# Patient Record
Sex: FEMALE | Race: WHITE
[De-identification: names, ages, dates, MRNs, and addresses within clinical notes are randomized per-mention and may not be internally consistent; named-entity substitution may affect disease eponyms.]

---

## 2018-01-17 ENCOUNTER — HOSPITAL ENCOUNTER (INPATIENT)
Dept: HOSPITAL 35 - ER | Age: 73
LOS: 4 days | Discharge: HOME | DRG: 871 | End: 2018-01-21
Attending: HOSPITALIST | Admitting: HOSPITALIST
Payer: COMMERCIAL

## 2018-01-17 VITALS
BODY MASS INDEX: 28.35 KG/M2 | DIASTOLIC BLOOD PRESSURE: 79 MMHG | HEIGHT: 62.99 IN | WEIGHT: 160 LBS | SYSTOLIC BLOOD PRESSURE: 148 MMHG

## 2018-01-17 VITALS — SYSTOLIC BLOOD PRESSURE: 189 MMHG | DIASTOLIC BLOOD PRESSURE: 69 MMHG

## 2018-01-17 VITALS — DIASTOLIC BLOOD PRESSURE: 100 MMHG | SYSTOLIC BLOOD PRESSURE: 185 MMHG

## 2018-01-17 VITALS — SYSTOLIC BLOOD PRESSURE: 182 MMHG | DIASTOLIC BLOOD PRESSURE: 94 MMHG

## 2018-01-17 DIAGNOSIS — I11.0: ICD-10-CM

## 2018-01-17 DIAGNOSIS — J96.21: ICD-10-CM

## 2018-01-17 DIAGNOSIS — Z87.891: ICD-10-CM

## 2018-01-17 DIAGNOSIS — E11.9: ICD-10-CM

## 2018-01-17 DIAGNOSIS — A41.9: Primary | ICD-10-CM

## 2018-01-17 DIAGNOSIS — I48.0: ICD-10-CM

## 2018-01-17 DIAGNOSIS — Z87.01: ICD-10-CM

## 2018-01-17 DIAGNOSIS — Z86.14: ICD-10-CM

## 2018-01-17 DIAGNOSIS — F32.9: ICD-10-CM

## 2018-01-17 DIAGNOSIS — Z79.899: ICD-10-CM

## 2018-01-17 DIAGNOSIS — M54.9: ICD-10-CM

## 2018-01-17 DIAGNOSIS — Z90.49: ICD-10-CM

## 2018-01-17 DIAGNOSIS — I27.20: ICD-10-CM

## 2018-01-17 DIAGNOSIS — J44.1: ICD-10-CM

## 2018-01-17 DIAGNOSIS — J11.1: ICD-10-CM

## 2018-01-17 DIAGNOSIS — F41.9: ICD-10-CM

## 2018-01-17 DIAGNOSIS — I50.31: ICD-10-CM

## 2018-01-17 DIAGNOSIS — E78.5: ICD-10-CM

## 2018-01-17 DIAGNOSIS — J96.22: ICD-10-CM

## 2018-01-17 DIAGNOSIS — Z90.710: ICD-10-CM

## 2018-01-17 DIAGNOSIS — G89.29: ICD-10-CM

## 2018-01-17 DIAGNOSIS — Z85.828: ICD-10-CM

## 2018-01-17 DIAGNOSIS — E78.00: ICD-10-CM

## 2018-01-17 DIAGNOSIS — R65.20: ICD-10-CM

## 2018-01-17 LAB
ALBUMIN SERPL-MCNC: 3.1 G/DL (ref 3.4–5)
ALT SERPL-CCNC: 31 U/L (ref 30–65)
ANION GAP SERPL CALC-SCNC: 3 MMOL/L (ref 7–16)
AST SERPL-CCNC: 33 U/L (ref 15–37)
BASOPHILS NFR BLD AUTO: 0.7 % (ref 0–2)
BE(VIVO): 9 MMOL/L
BILIRUB SERPL-MCNC: 0.3 MG/DL
BUN SERPL-MCNC: 25 MG/DL (ref 7–18)
CALCIUM SERPL-MCNC: 9.5 MG/DL (ref 8.5–10.1)
CHLORIDE SERPL-SCNC: 98 MMOL/L (ref 98–107)
CO2 SERPL-SCNC: 38 MMOL/L (ref 21–32)
CREAT SERPL-MCNC: 0.9 MG/DL (ref 0.6–1)
EOSINOPHIL NFR BLD: 6 % (ref 0–3)
ERYTHROCYTE [DISTWIDTH] IN BLOOD BY AUTOMATED COUNT: 18.4 % (ref 10.5–14.5)
GLUCOSE SERPL-MCNC: 71 MG/DL (ref 74–106)
GRANULOCYTES NFR BLD MANUAL: 44.2 % (ref 36–66)
HCO3 BLD-SCNC: 35.2 MMOL/L (ref 22–26)
HCT VFR BLD CALC: 35.3 % (ref 37–47)
HGB BLD-MCNC: 11.4 GM/DL (ref 12–15)
LYMPHOCYTES NFR BLD AUTO: 39 % (ref 24–44)
MCH RBC QN AUTO: 26.2 PG (ref 26–34)
MCHC RBC AUTO-ENTMCNC: 32.4 G/DL (ref 28–37)
MCV RBC: 80.8 FL (ref 80–100)
MONOCYTES NFR BLD: 10.1 % (ref 1–8)
NEUTROPHILS # BLD: 1.8 THOU/UL (ref 1.4–8.2)
PCO2 BLD: 56 MMHG (ref 35–45)
PLATELET # BLD: 166 THOU/UL (ref 150–400)
PO2 BLD: 48 MMHG (ref 80–100)
POTASSIUM SERPL-SCNC: 3.8 MMOL/L (ref 3.5–5.1)
PROT SERPL-MCNC: 7 G/DL (ref 6.4–8.2)
RBC # BLD AUTO: 4.37 MIL/UL (ref 4.2–5)
SODIUM SERPL-SCNC: 139 MMOL/L (ref 136–145)
TROPONIN I SERPL-MCNC: < 0.04 NG/ML (ref ?–0.06)
WBC # BLD AUTO: 4 THOU/UL (ref 4–11)

## 2018-01-17 PROCEDURE — 27001 TENOTOMY ADDUCTOR HIP OPEN: CPT

## 2018-01-17 PROCEDURE — 10100: CPT

## 2018-01-18 VITALS — DIASTOLIC BLOOD PRESSURE: 63 MMHG | SYSTOLIC BLOOD PRESSURE: 159 MMHG

## 2018-01-18 VITALS — SYSTOLIC BLOOD PRESSURE: 159 MMHG | DIASTOLIC BLOOD PRESSURE: 63 MMHG

## 2018-01-18 VITALS — SYSTOLIC BLOOD PRESSURE: 164 MMHG | DIASTOLIC BLOOD PRESSURE: 68 MMHG

## 2018-01-18 VITALS — DIASTOLIC BLOOD PRESSURE: 70 MMHG | SYSTOLIC BLOOD PRESSURE: 156 MMHG

## 2018-01-18 VITALS — SYSTOLIC BLOOD PRESSURE: 108 MMHG | DIASTOLIC BLOOD PRESSURE: 63 MMHG

## 2018-01-18 VITALS — DIASTOLIC BLOOD PRESSURE: 68 MMHG | SYSTOLIC BLOOD PRESSURE: 164 MMHG

## 2018-01-18 LAB
ANION GAP SERPL CALC-SCNC: 4 MMOL/L (ref 7–16)
ANION GAP SERPL CALC-SCNC: 5 MMOL/L (ref 7–16)
BUN SERPL-MCNC: 26 MG/DL (ref 7–18)
BUN SERPL-MCNC: 26 MG/DL (ref 7–18)
CALCIUM SERPL-MCNC: 8.9 MG/DL (ref 8.5–10.1)
CALCIUM SERPL-MCNC: 9.3 MG/DL (ref 8.5–10.1)
CHLORIDE SERPL-SCNC: 97 MMOL/L (ref 98–107)
CHLORIDE SERPL-SCNC: 98 MMOL/L (ref 98–107)
CO2 SERPL-SCNC: 34 MMOL/L (ref 21–32)
CO2 SERPL-SCNC: 34 MMOL/L (ref 21–32)
CREAT SERPL-MCNC: 0.9 MG/DL (ref 0.6–1)
CREAT SERPL-MCNC: 1 MG/DL (ref 0.6–1)
ERYTHROCYTE [DISTWIDTH] IN BLOOD BY AUTOMATED COUNT: 18.2 % (ref 10.5–14.5)
ERYTHROCYTE [DISTWIDTH] IN BLOOD BY AUTOMATED COUNT: 18.6 % (ref 10.5–14.5)
GLUCOSE SERPL-MCNC: 270 MG/DL (ref 74–106)
GLUCOSE SERPL-MCNC: 296 MG/DL (ref 74–106)
GRANULOCYTES NFR BLD MANUAL: 75 % (ref 36–66)
HCT VFR BLD CALC: 32.5 % (ref 37–47)
HCT VFR BLD CALC: 34.1 % (ref 37–47)
HGB BLD-MCNC: 10.8 GM/DL (ref 12–15)
HGB BLD-MCNC: 11.1 GM/DL (ref 12–15)
LYMPHOCYTES NFR BLD AUTO: 17 % (ref 24–44)
MCH RBC QN AUTO: 26.1 PG (ref 26–34)
MCH RBC QN AUTO: 26.5 PG (ref 26–34)
MCHC RBC AUTO-ENTMCNC: 32.4 G/DL (ref 28–37)
MCHC RBC AUTO-ENTMCNC: 33.1 G/DL (ref 28–37)
MCV RBC: 80.3 FL (ref 80–100)
MCV RBC: 80.5 FL (ref 80–100)
MONOCYTES NFR BLD: 4 % (ref 1–8)
NEUTROPHILS # BLD: 1.4 THOU/UL (ref 1.4–8.2)
NEUTS BAND NFR BLD: 4 % (ref 0–8)
PLATELET # BLD: 158 THOU/UL (ref 150–400)
PLATELET # BLD: 163 THOU/UL (ref 150–400)
POTASSIUM SERPL-SCNC: 2.9 MMOL/L (ref 3.5–5.1)
POTASSIUM SERPL-SCNC: 3.2 MMOL/L (ref 3.5–5.1)
RBC # BLD AUTO: 4.05 MIL/UL (ref 4.2–5)
RBC # BLD AUTO: 4.24 MIL/UL (ref 4.2–5)
SODIUM SERPL-SCNC: 136 MMOL/L (ref 136–145)
SODIUM SERPL-SCNC: 136 MMOL/L (ref 136–145)
WBC # BLD AUTO: 1.8 THOU/UL (ref 4–11)
WBC # BLD AUTO: 1.9 THOU/UL (ref 4–11)

## 2018-01-19 VITALS — SYSTOLIC BLOOD PRESSURE: 156 MMHG | DIASTOLIC BLOOD PRESSURE: 77 MMHG

## 2018-01-19 VITALS — DIASTOLIC BLOOD PRESSURE: 61 MMHG | SYSTOLIC BLOOD PRESSURE: 152 MMHG

## 2018-01-19 VITALS — SYSTOLIC BLOOD PRESSURE: 133 MMHG | DIASTOLIC BLOOD PRESSURE: 61 MMHG

## 2018-01-19 VITALS — DIASTOLIC BLOOD PRESSURE: 63 MMHG | SYSTOLIC BLOOD PRESSURE: 144 MMHG

## 2018-01-19 VITALS — DIASTOLIC BLOOD PRESSURE: 55 MMHG | SYSTOLIC BLOOD PRESSURE: 123 MMHG

## 2018-01-19 LAB
ANION GAP SERPL CALC-SCNC: 6 MMOL/L (ref 7–16)
BASOPHILS NFR BLD AUTO: 0.1 % (ref 0–2)
BUN SERPL-MCNC: 28 MG/DL (ref 7–18)
CALCIUM SERPL-MCNC: 9.2 MG/DL (ref 8.5–10.1)
CHLORIDE SERPL-SCNC: 99 MMOL/L (ref 98–107)
CO2 SERPL-SCNC: 33 MMOL/L (ref 21–32)
CREAT SERPL-MCNC: 0.9 MG/DL (ref 0.6–1)
EOSINOPHIL NFR BLD: 0 % (ref 0–3)
ERYTHROCYTE [DISTWIDTH] IN BLOOD BY AUTOMATED COUNT: 18.3 % (ref 10.5–14.5)
GLUCOSE SERPL-MCNC: 189 MG/DL (ref 74–106)
GRANULOCYTES NFR BLD MANUAL: 83.2 % (ref 36–66)
HCT VFR BLD CALC: 33.1 % (ref 37–47)
HGB BLD-MCNC: 10.8 GM/DL (ref 12–15)
LYMPHOCYTES NFR BLD AUTO: 13.6 % (ref 24–44)
MCH RBC QN AUTO: 26.2 PG (ref 26–34)
MCHC RBC AUTO-ENTMCNC: 32.5 G/DL (ref 28–37)
MCV RBC: 80.8 FL (ref 80–100)
MONOCYTES NFR BLD: 3.1 % (ref 1–8)
NEUTROPHILS # BLD: 5 THOU/UL (ref 1.4–8.2)
PLATELET # BLD: 176 THOU/UL (ref 150–400)
POTASSIUM SERPL-SCNC: 3.6 MMOL/L (ref 3.5–5.1)
RBC # BLD AUTO: 4.1 MIL/UL (ref 4.2–5)
SODIUM SERPL-SCNC: 138 MMOL/L (ref 136–145)
WBC # BLD AUTO: 6.1 THOU/UL (ref 4–11)

## 2018-01-20 VITALS — DIASTOLIC BLOOD PRESSURE: 63 MMHG | SYSTOLIC BLOOD PRESSURE: 158 MMHG

## 2018-01-20 VITALS — SYSTOLIC BLOOD PRESSURE: 179 MMHG | DIASTOLIC BLOOD PRESSURE: 85 MMHG

## 2018-01-20 VITALS — SYSTOLIC BLOOD PRESSURE: 179 MMHG | DIASTOLIC BLOOD PRESSURE: 87 MMHG

## 2018-01-21 VITALS — SYSTOLIC BLOOD PRESSURE: 179 MMHG | DIASTOLIC BLOOD PRESSURE: 90 MMHG

## 2018-01-21 VITALS — SYSTOLIC BLOOD PRESSURE: 168 MMHG | DIASTOLIC BLOOD PRESSURE: 86 MMHG

## 2018-01-21 VITALS — DIASTOLIC BLOOD PRESSURE: 86 MMHG | SYSTOLIC BLOOD PRESSURE: 168 MMHG

## 2018-02-01 ENCOUNTER — HOSPITAL ENCOUNTER (OUTPATIENT)
Dept: HOSPITAL 35 - CV | Age: 73
End: 2018-02-01
Attending: INTERNAL MEDICINE
Payer: COMMERCIAL

## 2018-02-01 DIAGNOSIS — I08.1: Primary | ICD-10-CM

## 2018-02-07 ENCOUNTER — HOSPITAL ENCOUNTER (OUTPATIENT)
Dept: HOSPITAL 35 - RAD | Age: 73
End: 2018-02-07
Attending: INTERNAL MEDICINE
Payer: COMMERCIAL

## 2018-02-07 DIAGNOSIS — J45.30: Primary | ICD-10-CM

## 2018-02-16 ENCOUNTER — HOSPITAL ENCOUNTER (EMERGENCY)
Dept: HOSPITAL 35 - ER | Age: 73
Discharge: HOME | End: 2018-02-16
Payer: COMMERCIAL

## 2018-02-16 VITALS — WEIGHT: 210.01 LBS | HEIGHT: 63 IN | BODY MASS INDEX: 37.21 KG/M2

## 2018-02-16 VITALS — SYSTOLIC BLOOD PRESSURE: 159 MMHG | DIASTOLIC BLOOD PRESSURE: 66 MMHG

## 2018-02-16 DIAGNOSIS — I10: ICD-10-CM

## 2018-02-16 DIAGNOSIS — E11.9: ICD-10-CM

## 2018-02-16 DIAGNOSIS — M79.7: ICD-10-CM

## 2018-02-16 DIAGNOSIS — F41.9: ICD-10-CM

## 2018-02-16 DIAGNOSIS — Z87.891: ICD-10-CM

## 2018-02-16 DIAGNOSIS — E87.6: ICD-10-CM

## 2018-02-16 DIAGNOSIS — J44.1: Primary | ICD-10-CM

## 2018-02-16 LAB
ANION GAP SERPL CALC-SCNC: 4 MMOL/L (ref 7–16)
BASOPHILS NFR BLD AUTO: 0 % (ref 0–2)
BE(VIVO): 7.2 MMOL/L
BUN SERPL-MCNC: 10 MG/DL (ref 7–18)
CALCIUM SERPL-MCNC: 9.5 MG/DL (ref 8.5–10.1)
CHLORIDE SERPL-SCNC: 101 MMOL/L (ref 98–107)
CO2 SERPL-SCNC: 34 MMOL/L (ref 21–32)
CREAT SERPL-MCNC: 0.8 MG/DL (ref 0.6–1)
EOSINOPHIL NFR BLD: 3 % (ref 0–3)
ERYTHROCYTE [DISTWIDTH] IN BLOOD BY AUTOMATED COUNT: 17.5 % (ref 10.5–14.5)
GLUCOSE SERPL-MCNC: 198 MG/DL (ref 74–106)
GRANULOCYTES NFR BLD MANUAL: 59 % (ref 36–66)
HCO3 BLD-SCNC: 31.4 MMOL/L (ref 22–26)
HCT VFR BLD CALC: 33.1 % (ref 37–47)
HGB BLD-MCNC: 10.9 GM/DL (ref 12–15)
LYMPHOCYTES NFR BLD AUTO: 12 % (ref 24–44)
MCH RBC QN AUTO: 27 PG (ref 26–34)
MCHC RBC AUTO-ENTMCNC: 32.8 G/DL (ref 28–37)
MCV RBC: 82.2 FL (ref 80–100)
METAMYELOCYTES NFR BLD: 2 %
MONOCYTES NFR BLD: 10 % (ref 1–8)
NEUTROPHILS # BLD: 7.6 THOU/UL (ref 1.4–8.2)
NEUTS BAND NFR BLD: 8 % (ref 0–8)
PCO2 BLD: 42.7 MMHG (ref 35–45)
PLATELET # BLD EST: NORMAL 10*3/UL
PLATELET # BLD: 190 THOU/UL (ref 150–400)
PO2 BLD: 81.7 MMHG (ref 80–100)
POTASSIUM SERPL-SCNC: 2.5 MMOL/L (ref 3.5–5.1)
RBC # BLD AUTO: 4.03 MIL/UL (ref 4.2–5)
RBC MORPH BLD: NORMAL
SODIUM SERPL-SCNC: 139 MMOL/L (ref 136–145)
TROPONIN I SERPL-MCNC: < 0.04 NG/ML (ref ?–0.06)
VARIANT LYMPHS NFR BLD MANUAL: 6 %
WBC # BLD AUTO: 11.4 THOU/UL (ref 4–11)

## 2018-03-25 ENCOUNTER — HOSPITAL ENCOUNTER (EMERGENCY)
Dept: HOSPITAL 35 - ER | Age: 73
Discharge: HOME | End: 2018-03-25
Payer: COMMERCIAL

## 2018-03-25 VITALS — DIASTOLIC BLOOD PRESSURE: 56 MMHG | SYSTOLIC BLOOD PRESSURE: 133 MMHG

## 2018-03-25 VITALS — WEIGHT: 210.01 LBS | BODY MASS INDEX: 37.21 KG/M2 | HEIGHT: 63 IN

## 2018-03-25 DIAGNOSIS — M79.7: ICD-10-CM

## 2018-03-25 DIAGNOSIS — R10.30: ICD-10-CM

## 2018-03-25 DIAGNOSIS — M25.562: ICD-10-CM

## 2018-03-25 DIAGNOSIS — Z86.14: ICD-10-CM

## 2018-03-25 DIAGNOSIS — F41.9: ICD-10-CM

## 2018-03-25 DIAGNOSIS — M25.561: Primary | ICD-10-CM

## 2018-03-25 DIAGNOSIS — E11.9: ICD-10-CM

## 2018-03-25 DIAGNOSIS — J44.9: ICD-10-CM

## 2018-03-25 DIAGNOSIS — Z90.710: ICD-10-CM

## 2018-03-25 DIAGNOSIS — F11.20: ICD-10-CM

## 2018-03-25 DIAGNOSIS — I10: ICD-10-CM

## 2018-03-25 DIAGNOSIS — E78.00: ICD-10-CM

## 2018-03-25 DIAGNOSIS — G89.29: ICD-10-CM

## 2018-03-25 DIAGNOSIS — Z85.828: ICD-10-CM

## 2018-03-25 DIAGNOSIS — Z87.891: ICD-10-CM

## 2018-03-25 DIAGNOSIS — M25.572: ICD-10-CM

## 2018-03-25 LAB
BILIRUB UR-MCNC: NEGATIVE MG/DL
COLOR UR: YELLOW
KETONES UR STRIP-MCNC: NEGATIVE MG/DL
NITRITE UR QL STRIP: NEGATIVE
RBC # UR STRIP: NEGATIVE /UL
SP GR UR STRIP: 1.01 (ref 1–1.03)
URINE CLARITY: CLEAR
URINE GLUCOSE-RANDOM*: NEGATIVE
URINE LEUKOCYTES: NEGATIVE
URINE PROTEIN (DIPSTICK): NEGATIVE
UROBILINOGEN UR STRIP-ACNC: 0.2 E.U./DL (ref 0.2–1)

## 2018-03-26 ENCOUNTER — HOSPITAL ENCOUNTER (EMERGENCY)
Dept: HOSPITAL 35 - ER | Age: 73
Discharge: HOME | End: 2018-03-26
Payer: COMMERCIAL

## 2018-03-26 VITALS — SYSTOLIC BLOOD PRESSURE: 188 MMHG | DIASTOLIC BLOOD PRESSURE: 70 MMHG

## 2018-03-26 VITALS — HEIGHT: 63 IN | WEIGHT: 210.01 LBS | BODY MASS INDEX: 37.21 KG/M2

## 2018-03-26 DIAGNOSIS — E78.00: ICD-10-CM

## 2018-03-26 DIAGNOSIS — F41.9: ICD-10-CM

## 2018-03-26 DIAGNOSIS — Z86.14: ICD-10-CM

## 2018-03-26 DIAGNOSIS — Z85.828: ICD-10-CM

## 2018-03-26 DIAGNOSIS — J44.9: ICD-10-CM

## 2018-03-26 DIAGNOSIS — I48.91: ICD-10-CM

## 2018-03-26 DIAGNOSIS — E11.9: ICD-10-CM

## 2018-03-26 DIAGNOSIS — R22.0: Primary | ICD-10-CM

## 2018-03-26 DIAGNOSIS — M79.7: ICD-10-CM

## 2018-03-26 DIAGNOSIS — Z87.891: ICD-10-CM

## 2018-03-26 DIAGNOSIS — I10: ICD-10-CM

## 2018-03-26 DIAGNOSIS — Z90.710: ICD-10-CM

## 2018-03-26 LAB
ANION GAP SERPL CALC-SCNC: 6 MMOL/L (ref 7–16)
ANISOCYTOSIS BLD QL SMEAR: (no result)
BUN SERPL-MCNC: 22 MG/DL (ref 7–18)
CALCIUM SERPL-MCNC: 10.3 MG/DL (ref 8.5–10.1)
CHLORIDE SERPL-SCNC: 97 MMOL/L (ref 98–107)
CO2 SERPL-SCNC: 39 MMOL/L (ref 21–32)
CREAT SERPL-MCNC: 1.1 MG/DL (ref 0.6–1)
EOSINOPHIL NFR BLD: 2 % (ref 0–3)
ERYTHROCYTE [DISTWIDTH] IN BLOOD BY AUTOMATED COUNT: 15.8 % (ref 10.5–14.5)
GLUCOSE SERPL-MCNC: 133 MG/DL (ref 74–106)
GRANULOCYTES NFR BLD MANUAL: 73 % (ref 36–66)
HCT VFR BLD CALC: 37.3 % (ref 37–47)
HGB BLD-MCNC: 12.3 GM/DL (ref 12–15)
LYMPHOCYTES NFR BLD AUTO: 18 % (ref 24–44)
MCH RBC QN AUTO: 27.4 PG (ref 26–34)
MCHC RBC AUTO-ENTMCNC: 32.9 G/DL (ref 28–37)
MCV RBC: 83.3 FL (ref 80–100)
METAMYELOCYTES NFR BLD: 2 %
MONOCYTES NFR BLD: 2 % (ref 1–8)
NEUTROPHILS # BLD: 6.8 THOU/UL (ref 1.4–8.2)
NEUTS BAND NFR BLD: 3 % (ref 0–8)
PLATELET # BLD: 194 THOU/UL (ref 150–400)
POLYCHROMASIA BLD QL SMEAR: SLIGHT
POTASSIUM SERPL-SCNC: 3.8 MMOL/L (ref 3.5–5.1)
RBC # BLD AUTO: 4.47 MIL/UL (ref 4.2–5)
SODIUM SERPL-SCNC: 142 MMOL/L (ref 136–145)
WBC # BLD AUTO: 9 THOU/UL (ref 4–11)

## 2018-09-19 ENCOUNTER — HOSPITAL ENCOUNTER (EMERGENCY)
Dept: HOSPITAL 35 - ER | Age: 73
Discharge: HOME | End: 2018-09-19
Payer: COMMERCIAL

## 2018-09-19 VITALS — HEIGHT: 63 IN | WEIGHT: 210.01 LBS | BODY MASS INDEX: 37.21 KG/M2

## 2018-09-19 VITALS — DIASTOLIC BLOOD PRESSURE: 79 MMHG | SYSTOLIC BLOOD PRESSURE: 170 MMHG

## 2018-09-19 DIAGNOSIS — M79.7: ICD-10-CM

## 2018-09-19 DIAGNOSIS — E11.9: ICD-10-CM

## 2018-09-19 DIAGNOSIS — Z85.828: ICD-10-CM

## 2018-09-19 DIAGNOSIS — I48.91: ICD-10-CM

## 2018-09-19 DIAGNOSIS — I10: ICD-10-CM

## 2018-09-19 DIAGNOSIS — E78.00: ICD-10-CM

## 2018-09-19 DIAGNOSIS — M17.0: Primary | ICD-10-CM

## 2018-09-19 DIAGNOSIS — M54.5: ICD-10-CM

## 2018-09-19 DIAGNOSIS — J44.9: ICD-10-CM

## 2018-09-19 DIAGNOSIS — F41.9: ICD-10-CM

## 2018-09-19 DIAGNOSIS — Z90.710: ICD-10-CM

## 2018-09-19 DIAGNOSIS — Z87.891: ICD-10-CM

## 2018-09-19 LAB
ANION GAP SERPL CALC-SCNC: 3 MMOL/L (ref 7–16)
BASOPHILS NFR BLD AUTO: 0.4 % (ref 0–2)
BUN SERPL-MCNC: 17 MG/DL (ref 7–18)
CALCIUM SERPL-MCNC: 10.2 MG/DL (ref 8.5–10.1)
CHLORIDE SERPL-SCNC: 104 MMOL/L (ref 98–107)
CO2 SERPL-SCNC: 36 MMOL/L (ref 21–32)
CREAT SERPL-MCNC: 1 MG/DL (ref 0.6–1)
EOSINOPHIL NFR BLD: 2.2 % (ref 0–3)
ERYTHROCYTE [DISTWIDTH] IN BLOOD BY AUTOMATED COUNT: 14.5 % (ref 10.5–14.5)
GLUCOSE SERPL-MCNC: 89 MG/DL (ref 74–106)
GRANULOCYTES NFR BLD MANUAL: 55.2 % (ref 36–66)
HCT VFR BLD CALC: 38 % (ref 37–47)
HGB BLD-MCNC: 12.5 GM/DL (ref 12–15)
LYMPHOCYTES NFR BLD AUTO: 33.8 % (ref 24–44)
MCH RBC QN AUTO: 27.4 PG (ref 26–34)
MCHC RBC AUTO-ENTMCNC: 32.9 G/DL (ref 28–37)
MCV RBC: 83.2 FL (ref 80–100)
MONOCYTES NFR BLD: 8.4 % (ref 1–8)
NEUTROPHILS # BLD: 3.7 THOU/UL (ref 1.4–8.2)
PLATELET # BLD: 165 THOU/UL (ref 150–400)
POTASSIUM SERPL-SCNC: 3.7 MMOL/L (ref 3.5–5.1)
RBC # BLD AUTO: 4.57 MIL/UL (ref 4.2–5)
SODIUM SERPL-SCNC: 143 MMOL/L (ref 136–145)
WBC # BLD AUTO: 6.8 THOU/UL (ref 4–11)

## 2018-10-17 VITALS — HEIGHT: 63 IN | WEIGHT: 200 LBS

## 2018-10-31 ENCOUNTER — HOSPITAL ENCOUNTER (EMERGENCY)
Dept: HOSPITAL 35 - ER | Age: 73
LOS: 1 days | Discharge: HOME | End: 2018-11-01
Payer: COMMERCIAL

## 2018-10-31 VITALS — WEIGHT: 200 LBS | BODY MASS INDEX: 35.44 KG/M2 | HEIGHT: 63 IN

## 2018-10-31 DIAGNOSIS — I27.20: ICD-10-CM

## 2018-10-31 DIAGNOSIS — G89.29: Primary | ICD-10-CM

## 2018-10-31 DIAGNOSIS — I48.0: ICD-10-CM

## 2018-10-31 DIAGNOSIS — M25.561: ICD-10-CM

## 2018-10-31 DIAGNOSIS — E11.40: ICD-10-CM

## 2018-10-31 DIAGNOSIS — Z87.01: ICD-10-CM

## 2018-10-31 DIAGNOSIS — Z86.14: ICD-10-CM

## 2018-10-31 DIAGNOSIS — Z85.828: ICD-10-CM

## 2018-10-31 DIAGNOSIS — J44.9: ICD-10-CM

## 2018-10-31 DIAGNOSIS — M25.562: ICD-10-CM

## 2018-10-31 DIAGNOSIS — M79.7: ICD-10-CM

## 2018-10-31 DIAGNOSIS — E78.00: ICD-10-CM

## 2018-10-31 DIAGNOSIS — Z87.891: ICD-10-CM

## 2018-10-31 LAB
ALBUMIN SERPL-MCNC: 3.3 G/DL (ref 3.4–5)
ALT SERPL-CCNC: 27 U/L (ref 30–65)
ANION GAP SERPL CALC-SCNC: 6 MMOL/L (ref 7–16)
AST SERPL-CCNC: 24 U/L (ref 15–37)
BASOPHILS NFR BLD AUTO: 1.1 % (ref 0–2)
BILIRUB SERPL-MCNC: 0.3 MG/DL
BUN SERPL-MCNC: 11 MG/DL (ref 7–18)
CALCIUM SERPL-MCNC: 9.6 MG/DL (ref 8.5–10.1)
CHLORIDE SERPL-SCNC: 102 MMOL/L (ref 98–107)
CO2 SERPL-SCNC: 32 MMOL/L (ref 21–32)
CREAT SERPL-MCNC: 1 MG/DL (ref 0.6–1)
EOSINOPHIL NFR BLD: 2.9 % (ref 0–3)
ERYTHROCYTE [DISTWIDTH] IN BLOOD BY AUTOMATED COUNT: 14.8 % (ref 10.5–14.5)
GLUCOSE SERPL-MCNC: 124 MG/DL (ref 74–106)
GRANULOCYTES NFR BLD MANUAL: 56.2 % (ref 36–66)
HCT VFR BLD CALC: 37.7 % (ref 37–47)
HGB BLD-MCNC: 12.5 GM/DL (ref 12–15)
LYMPHOCYTES NFR BLD AUTO: 31.6 % (ref 24–44)
MCH RBC QN AUTO: 26.8 PG (ref 26–34)
MCHC RBC AUTO-ENTMCNC: 33.3 G/DL (ref 28–37)
MCV RBC: 80.6 FL (ref 80–100)
MONOCYTES NFR BLD: 8.2 % (ref 1–8)
NEUTROPHILS # BLD: 4 THOU/UL (ref 1.4–8.2)
PLATELET # BLD: 187 THOU/UL (ref 150–400)
POTASSIUM SERPL-SCNC: 3.6 MMOL/L (ref 3.5–5.1)
PROT SERPL-MCNC: 6.9 G/DL (ref 6.4–8.2)
RBC # BLD AUTO: 4.67 MIL/UL (ref 4.2–5)
SODIUM SERPL-SCNC: 140 MMOL/L (ref 136–145)
WBC # BLD AUTO: 7.1 THOU/UL (ref 4–11)

## 2018-11-01 VITALS — SYSTOLIC BLOOD PRESSURE: 144 MMHG | DIASTOLIC BLOOD PRESSURE: 87 MMHG

## 2018-11-02 ENCOUNTER — APPOINTMENT (RX ONLY)
Dept: URBAN - METROPOLITAN AREA CLINIC 141 | Facility: CLINIC | Age: 73
Setting detail: DERMATOLOGY
End: 2018-11-02

## 2018-11-02 ENCOUNTER — APPOINTMENT (RX ONLY)
Dept: URBAN - METROPOLITAN AREA SURGERY CENTER 10 | Facility: SURGERY CENTER | Age: 73
Setting detail: DERMATOLOGY
End: 2018-11-02

## 2018-11-02 VITALS
HEIGHT: 63 IN | SYSTOLIC BLOOD PRESSURE: 136 MMHG | DIASTOLIC BLOOD PRESSURE: 88 MMHG | WEIGHT: 200 LBS | HEART RATE: 91 BPM

## 2018-11-02 PROBLEM — D04.39 CARCINOMA IN SITU OF SKIN OF OTHER PARTS OF FACE: Status: ACTIVE | Noted: 2018-11-02

## 2018-11-02 PROBLEM — M12.9 ARTHROPATHY, UNSPECIFIED: Status: ACTIVE | Noted: 2018-11-02

## 2018-11-02 PROBLEM — Z85.828 PERSONAL HISTORY OF OTHER MALIGNANT NEOPLASM OF SKIN: Status: ACTIVE | Noted: 2018-11-02

## 2018-11-02 PROBLEM — H91.90 UNSPECIFIED HEARING LOSS, UNSPECIFIED EAR: Status: ACTIVE | Noted: 2018-11-02

## 2018-11-02 PROBLEM — J44.9 CHRONIC OBSTRUCTIVE PULMONARY DISEASE, UNSPECIFIED: Status: ACTIVE | Noted: 2018-11-02

## 2018-11-02 PROBLEM — F32.9 MAJOR DEPRESSIVE DISORDER, SINGLE EPISODE, UNSPECIFIED: Status: ACTIVE | Noted: 2018-11-02

## 2018-11-02 PROBLEM — E13.9 OTHER SPECIFIED DIABETES MELLITUS WITHOUT COMPLICATIONS: Status: ACTIVE | Noted: 2018-11-02

## 2018-11-02 PROBLEM — F41.9 ANXIETY DISORDER, UNSPECIFIED: Status: ACTIVE | Noted: 2018-11-02

## 2018-11-02 PROCEDURE — 17311 MOHS 1 STAGE H/N/HF/G: CPT

## 2018-11-02 PROCEDURE — 13132 CMPLX RPR F/C/C/M/N/AX/G/H/F: CPT

## 2018-11-02 PROCEDURE — ? MOHS SURGERY

## 2018-11-02 PROCEDURE — 13133 CMPLX RPR F/C/C/M/N/AX/G/H/F: CPT

## 2018-11-02 PROCEDURE — ? PRESCRIPTION

## 2018-11-02 PROCEDURE — ? REPAIR NOTE

## 2018-11-02 RX ORDER — CEPHALEXIN 500 MG/1
CAPSULE ORAL
Qty: 30 | Refills: 0 | Status: ERX | COMMUNITY
Start: 2018-11-02

## 2018-11-02 RX ADMIN — CEPHALEXIN: 500 CAPSULE ORAL at 16:01

## 2018-11-02 NOTE — PROCEDURE: REPAIR NOTE
Double M-Plasty Complex Repair Preamble Text (Leave Blank If You Do Not Want): Extensive wide undermining was performed.
Mastoid Interpolation Flap Text: A decision was made to reconstruct the defect utilizing an interpolation axial flap and a staged reconstruction.  A telfa template was made of the defect.  This telfa template was then used to outline the mastoid interpolation flap.  The donor area for the pedicle flap was then injected with anesthesia.  The flap was excised through the skin and subcutaneous tissue down to the layer of the underlying musculature.  The pedicle flap was carefully excised within this deep plane to maintain its blood supply.  The edges of the donor site were undermined.   The donor site was closed in a primary fashion.  The pedicle was then rotated into position and sutured.  Once the tube was sutured into place, adequate blood supply was confirmed with blanching and refill.  The pedicle was then wrapped with xeroform gauze and dressed appropriately with a telfa and gauze bandage to ensure continued blood supply and protect the attached pedicle.
Mucosal Advancement Flap Text: Given the location of the defect, shape of the defect and the proximity to free margins a mucosal advancement flap was deemed most appropriate. Incisions were made with a 15 blade scalpel in the appropriate fashion along the cutaneous vermillion border and the mucosal lip. The remaining actinically damaged mucosal tissue was excised.  The mucosal advancement flap was then elevated to the gingival sulcus with care taken to preserve the neurovascular structures and advanced into the primary defect. Care was taken to ensure that precise realignment of the vermillion border was achieved.
Cheek-To-Nose Interpolation Flap Text: A decision was made to reconstruct the defect utilizing an interpolation axial flap and a staged reconstruction.  A telfa template was made of the defect.  This telfa template was then used to outline the Cheek-To-Nose Interpolation flap.  The donor area for the pedicle flap was then injected with anesthesia.  The flap was excised through the skin and subcutaneous tissue down to the layer of the underlying musculature.  The interpolation flap was carefully excised within this deep plane to maintain its blood supply.  The edges of the donor site were undermined.   The donor site was closed in a primary fashion.  The pedicle was then rotated into position and sutured.  Once the tube was sutured into place, adequate blood supply was confirmed with blanching and refill.  The pedicle was then wrapped with xeroform gauze and dressed appropriately with a telfa and gauze bandage to ensure continued blood supply and protect the attached pedicle.
Crescentic Intermediate Repair Preamble Text (Leave Blank If You Do Not Want): Undermining was performed with blunt dissection.
Detail Level: Detailed
Cartilage Fenestration Performed?: No
Cheek To Nose Interpolation Flap Division And Inset Text: Division and inset of the cheek to nose interpolation flap was performed to achieve optimal aesthetic result, restore normal anatomic appearance and avoid distortion of normal anatomy, expedite and facilitate wound healing, achieve optimal functional result and because linear closure either not possible or would produce suboptimal result. The patient was prepped and draped in the usual manner. The pedicle was infiltrated with local anesthesia. The pedicle was sectioned with a #15 blade. The pedicle was de-bulked and trimmed to match the shape of the defect. Hemostasis was achieved. The flap donor site and free margin of the flap were secured with deep buried sutures and the wound edges were re-approximated.
V-Y Plasty Text: The defect edges were debeveled with a #15 scalpel blade.  Given the location of the defect, shape of the defect and the proximity to free margins an V-Y advancement flap was deemed most appropriate.  Using a sterile surgical marker, an appropriate advancement flap was drawn incorporating the defect and placing the expected incisions within the relaxed skin tension lines where possible.    The area thus outlined was incised deep to adipose tissue with a #15 scalpel blade.  The skin margins were undermined to an appropriate distance in all directions utilizing iris scissors.
Skin Substitute Units (Will Override Primary Defect Units If Greater Than 0): 0
Transposition Flap Text: The defect edges were debeveled with a #15 scalpel blade.  Given the location of the defect and the proximity to free margins a transposition flap was deemed most appropriate.  Using a sterile surgical marker, an appropriate transposition flap was drawn incorporating the defect.    The area thus outlined was incised deep to adipose tissue with a #15 scalpel blade.  The skin margins were undermined to an appropriate distance in all directions utilizing iris scissors.
Partial Purse String (Simple) Text: Given the location of the defect and the characteristics of the surrounding skin a simple purse string closure was deemed most appropriate.  Undermining was performed circumfirentially around the surgical defect.  A purse string suture was then placed and tightened. Wound tension only allowed a partial closure of the circular defect.
Referred To Oculoplastics For Closure Text (Leave Blank If You Do Not Want): After obtaining clear surgical margins the patient was sent to oculoplastics for surgical repair.  The patient understands they will receive post-surgical care and follow-up from the referring physician's office.
Epidermal Autograft Text: The defect edges were debeveled with a #15 scalpel blade.  Given the location of the defect, shape of the defect and the proximity to free margins an epidermal autograft was deemed most appropriate.  Using a sterile surgical marker, the primary defect shape was transferred to the donor site. The epidermal graft was then harvested.  The skin graft was then placed in the primary defect and oriented appropriately.
O-T Plasty Text: The defect edges were debeveled with a #15 scalpel blade.  Given the location of the defect, shape of the defect and the proximity to free margins an O-T plasty was deemed most appropriate.  Using a sterile surgical marker, an appropriate O-T plasty was drawn incorporating the defect and placing the expected incisions within the relaxed skin tension lines where possible.    The area thus outlined was incised deep to adipose tissue with a #15 scalpel blade.  The skin margins were undermined to an appropriate distance in all directions utilizing iris scissors.
Date Of Previous Surgery (Optional): 11/2/18
Paramedian Forehead Flap Text: A decision was made to reconstruct the defect utilizing an interpolation axial flap and a staged reconstruction.  A telfa template was made of the defect.  This telfa template was then used to outline the paramedian forehead pedicle flap.  The donor area for the pedicle flap was then injected with anesthesia.  The flap was excised through the skin and subcutaneous tissue down to the layer of the underlying musculature.  The pedicle flap was carefully excised within this deep plane to maintain its blood supply.  The edges of the donor site were undermined.   The donor site was closed in a primary fashion.  The pedicle was then rotated into position and sutured.  Once the tube was sutured into place, adequate blood supply was confirmed with blanching and refill.  The pedicle was then wrapped with xeroform gauze and dressed appropriately with a telfa and gauze bandage to ensure continued blood supply and protect the attached pedicle.
Keystone Flap Text: The defect edges were debeveled with a #15 scalpel blade.  Given the location of the defect, shape of the defect a keystone flap was deemed most appropriate.  Using a sterile surgical marker, an appropriate keystone flap was drawn incorporating the defect, outlining the appropriate donor tissue and placing the expected incisions within the relaxed skin tension lines where possible. The area thus outlined was incised deep to adipose tissue with a #15 scalpel blade.  The skin margins were undermined to an appropriate distance in all directions around the primary defect and laterally outward around the flap utilizing iris scissors.
Unna Boot Text: An Unna boot was placed to help immobilize the limb and facilitate more rapid healing.
Graft Donor Site Epidermal Sutures (Optional): 5-0 Plain Gut
Crescentic Advancement Flap Text: The defect edges were debeveled with a #15 scalpel blade.  Given the location of the defect and the proximity to free margins a crescentic advancement flap was deemed most appropriate.  Using a sterile surgical marker, the appropriate advancement flap was drawn incorporating the defect and placing the expected incisions within the relaxed skin tension lines where possible.    The area thus outlined was incised deep to adipose tissue with a #15 scalpel blade.  The skin margins were undermined to an appropriate distance in all directions utilizing iris scissors.
Epidermal Sutures: 6-0 Prolene
Referred To Mid-Level For Closure Text (Leave Blank If You Do Not Want): After obtaining clear surgical margins the patient was sent to a mid-level provider for surgical repair.  The patient understands they will receive post-surgical care and follow-up from the mid-level provider.
Island Pedicle Flap Text: The defect edges were debeveled with a #15 scalpel blade.  Given the location of the defect, shape of the defect and the proximity to free margins an island pedicle advancement flap was deemed most appropriate.  Using a sterile surgical marker, an appropriate advancement flap was drawn incorporating the defect, outlining the appropriate donor tissue and placing the expected incisions within the relaxed skin tension lines where possible.    The area thus outlined was incised deep to adipose tissue with a #15 scalpel blade.  The skin margins were undermined to an appropriate distance in all directions around the primary defect and laterally outward around the island pedicle utilizing iris scissors.  There was minimal undermining beneath the pedicle flap.
Bi-Rhombic Flap Text: The defect edges were debeveled with a #15 scalpel blade.  Given the location of the defect and the proximity to free margins a bi-rhombic flap was deemed most appropriate.  Using a sterile surgical marker, an appropriate rhombic flap was drawn incorporating the defect. The area thus outlined was incised deep to adipose tissue with a #15 scalpel blade.  The skin margins were undermined to an appropriate distance in all directions utilizing iris scissors.
Burow's Advancement Flap Text: The defect edges were debeveled with a #15 scalpel blade.  Given the location of the defect and the proximity to free margins a Burow's advancement flap was deemed most appropriate.  Using a sterile surgical marker, the appropriate advancement flap was drawn incorporating the defect and placing the expected incisions within the relaxed skin tension lines where possible.    The area thus outlined was incised deep to adipose tissue with a #15 scalpel blade.  The skin margins were undermined to an appropriate distance in all directions utilizing iris scissors.
O-T Advancement Flap Text: The defect edges were debeveled with a #15 scalpel blade.  Given the location of the defect, shape of the defect and the proximity to free margins an O-T advancement flap was deemed most appropriate.  Using a sterile surgical marker, an appropriate advancement flap was drawn incorporating the defect and placing the expected incisions within the relaxed skin tension lines where possible.    The area thus outlined was incised deep to adipose tissue with a #15 scalpel blade.  The skin margins were undermined to an appropriate distance in all directions utilizing iris scissors.
Body Location Override (Optional - Billing Will Still Be Based On Selected Body Map Location If Applicable): Left Jaw Line
Post-Care Instructions: Detailed post op wound care instructions were reviewed with the patient, and written instructions were provided. The patient is not to engage in any heavy lifting, exercise, or swimming for at least 7-10 days. The patient was encouraged to contact the office with any post-operative concerns.
Primary Defect Width (In Cm): 2.6
H Plasty Text: Given the location of the defect, shape of the defect and the proximity to free margins a H-plasty was deemed most appropriate for repair.  Using a sterile surgical marker, the appropriate advancement arms of the H-plasty were drawn incorporating the defect and placing the expected incisions within the relaxed skin tension lines where possible. The area thus outlined was incised deep to adipose tissue with a #15 scalpel blade. The skin margins were undermined to an appropriate distance in all directions utilizing iris scissors.  The opposing advancement arms were then advanced into place in opposite direction and anchored with interrupted buried subcutaneous sutures.
Split-Thickness Skin Graft Text: The defect edges were debeveled with a #15 scalpel blade.  Given the location of the defect, shape of the defect and the proximity to free margins a split thickness skin graft was deemed most appropriate.  Using a sterile surgical marker, the primary defect shape was transferred to the donor site. The split thickness graft was then harvested.  The skin graft was then placed in the primary defect and oriented appropriately.
Posterior Auricular Interpolation Flap Text: A decision was made to reconstruct the defect utilizing an interpolation axial flap and a staged reconstruction.  A telfa template was made of the defect.  This telfa template was then used to outline the posterior auricular interpolation flap.  The donor area for the pedicle flap was then injected with anesthesia.  The flap was excised through the skin and subcutaneous tissue down to the layer of the underlying musculature.  The pedicle flap was carefully excised within this deep plane to maintain its blood supply.  The edges of the donor site were undermined.   The donor site was closed in a primary fashion.  The pedicle was then rotated into position and sutured.  Once the tube was sutured into place, adequate blood supply was confirmed with blanching and refill.  The pedicle was then wrapped with xeroform gauze and dressed appropriately with a telfa and gauze bandage to ensure continued blood supply and protect the attached pedicle.
Complex Repair Preamble Text (Leave Blank If You Do Not Want): The area was prepped with antiseptic solution, draped in a sterile fashion, and the planned surgical repair was demarcated using gentian violet incorporating the defect and proposed Burow’s triangles, placing the expected incisions within the relaxed skin tension lines where possible. The surgery site was infiltrated with local anesthesia. The proposed repair and Burow’s triangles were incised to the depth of the subcutaneous fat and/or superficial fascia, and wound edges were undermined broadly as needed to reduce tension at the incision. The defect edges were debeveled as needed. Hemostasis was achieved with electrocoagulation. The superficial fascial, subcutaneous, and dermal wound edges were approximated with buried vertical mattress sutures as noted below.
Graft Basting Suture (Optional): 5-0 Fast Absorbing Gut
Skin Substitute: EpiFix Micronized
Manual Repair Warning Statement: We plan on removing the manually selected variable below in favor of our much easier automatic structured text blocks found in the previous tab. We decided to do this to help make the flow better and give you the full power of structured data. Manual selection is never going to be ideal in our platform and I would encourage you to avoid using manual selection from this point on, especially since I will be sunsetting this feature. It is important that you do one of two things with the customized text below. First, you can save all of the text in a word file so you can have it for future reference. Second, transfer the text to the appropriate area in the Library tab. Lastly, if there is a flap or graft type which we do not have you need to let us know right away so I can add it in before the variable is hidden. No need to panic, we plan to give you roughly 6 months to make the change.
Skin Substitute Injection Text: The defect edges were debeveled with a #15 scalpel blade.  Given the location of the defect, shape of the defect and the proximity to free margins a skin substitute micronized graft was deemed most appropriate.  The entire vial contents were admixed with 3.0ccs of sterile saline and then injected subcutaneously throughout the entire wound bed.
Rhombic Flap Text: The defect edges were debeveled with a #15 scalpel blade.  Given the location of the defect and the proximity to free margins a rhombic flap was deemed most appropriate.  Using a sterile surgical marker, an appropriate rhombic flap was drawn incorporating the defect.    The area thus outlined was incised deep to adipose tissue with a #15 scalpel blade.  The skin margins were undermined to an appropriate distance in all directions utilizing iris scissors.
Trilobed Flap Text: The defect edges were debeveled with a #15 scalpel blade.  Given the location of the defect and the proximity to free margins a trilobed flap was deemed most appropriate.  Using a sterile surgical marker, an appropriate trilobed flap drawn around the defect.    The area thus outlined was incised deep to adipose tissue with a #15 scalpel blade.  The skin margins were undermined to an appropriate distance in all directions utilizing iris scissors.
Anesthesia Volume In Cc: 9.5
Complex Repair And Graft Additional Text (Will Appearing After The Standard Complex Repair Text): The complex repair was not sufficient to completely close the primary defect. The remaining additional defect was repaired with the graft mentioned below.
Repair Performed By Another Provider Text (Leave Blank If You Do Not Want): After obtaining clear surgical margins the defect was repaired by another provider.
O-Z Plasty Text: The defect edges were debeveled with a #15 scalpel blade.  Given the location of the defect, shape of the defect and the proximity to free margins an O-Z plasty (double transposition flap) was deemed most appropriate.  Using a sterile surgical marker, the appropriate transposition flaps were drawn incorporating the defect and placing the expected incisions within the relaxed skin tension lines where possible.    The area thus outlined was incised deep to adipose tissue with a #15 scalpel blade.  The skin margins were undermined to an appropriate distance in all directions utilizing iris scissors.  Hemostasis was achieved with electrocautery.  The flaps were then transposed into place, one clockwise and the other counterclockwise, and anchored with interrupted buried subcutaneous sutures.
Cheek Interpolation Flap Text: A decision was made to reconstruct the defect utilizing an interpolation axial flap and a staged reconstruction.  A telfa template was made of the defect.  This telfa template was then used to outline the Cheek Interpolation flap.  The donor area for the pedicle flap was then injected with anesthesia.  The flap was excised through the skin and subcutaneous tissue down to the layer of the underlying musculature.  The interpolation flap was carefully excised within this deep plane to maintain its blood supply.  The edges of the donor site were undermined.   The donor site was closed in a primary fashion.  The pedicle was then rotated into position and sutured.  Once the tube was sutured into place, adequate blood supply was confirmed with blanching and refill.  The pedicle was then wrapped with xeroform gauze and dressed appropriately with a telfa and gauze bandage to ensure continued blood supply and protect the attached pedicle.
Interpolation Flap Text: A decision was made to reconstruct the defect utilizing an interpolation axial flap and a staged reconstruction.  A telfa template was made of the defect.  This telfa template was then used to outline the interpolation flap.  The donor area for the pedicle flap was then injected with anesthesia.  The flap was excised through the skin and subcutaneous tissue down to the layer of the underlying musculature.  The interpolation flap was carefully excised within this deep plane to maintain its blood supply.  The edges of the donor site were undermined.   The donor site was closed in a primary fashion.  The pedicle was then rotated into position and sutured.  Once the tube was sutured into place, adequate blood supply was confirmed with blanching and refill.  The pedicle was then wrapped with xeroform gauze and dressed appropriately with a telfa and gauze bandage to ensure continued blood supply and protect the attached pedicle.
Hatchet Flap Text: The defect edges were debeveled with a #15 scalpel blade.  Given the location of the defect, shape of the defect and the proximity to free margins a hatchet flap was deemed most appropriate.  Using a sterile surgical marker, an appropriate hatchet flap was drawn incorporating the defect and placing the expected incisions within the relaxed skin tension lines where possible.    The area thus outlined was incised deep to adipose tissue with a #15 scalpel blade.  The skin margins were undermined to an appropriate distance in all directions utilizing iris scissors.
Tissue Cultured Epidermal Autograft Text: The defect edges were debeveled with a #15 scalpel blade.  Given the location of the defect, shape of the defect and the proximity to free margins a tissue cultured epidermal autograft was deemed most appropriate.  The graft was then trimmed to fit the size of the defect.  The graft was then placed in the primary defect and oriented appropriately.
Melolabial Interpolation Flap Text: A decision was made to reconstruct the defect utilizing an interpolation axial flap and a staged reconstruction.  A telfa template was made of the defect.  This telfa template was then used to outline the melolabial interpolation flap.  The donor area for the pedicle flap was then injected with anesthesia.  The flap was excised through the skin and subcutaneous tissue down to the layer of the underlying musculature.  The pedicle flap was carefully excised within this deep plane to maintain its blood supply.  The edges of the donor site were undermined.   The donor site was closed in a primary fashion.  The pedicle was then rotated into position and sutured.  Once the tube was sutured into place, adequate blood supply was confirmed with blanching and refill.  The pedicle was then wrapped with xeroform gauze and dressed appropriately with a telfa and gauze bandage to ensure continued blood supply and protect the attached pedicle.
No Repair - Repaired With Adjacent Surgical Defect Text (Leave Blank If You Do Not Want): After obtaining clear surgical margins the defect was repaired concurrently with another surgical defect which was in close approximation.
Cheiloplasty (Less Than 50%) Text: A decision was made to reconstruct the defect with a  cheiloplasty.  The defect was undermined extensively.  Additional obicularis oris muscle was excised with a 15 blade scalpel.  The defect was converted into a full thickness wedge, of less than 50% of the vertical height of the lip, to facilite a better cosmetic result.  Small vessels were then tied off with 5-0 monocyrl. The obicularis oris, superficial fascia, adipose and dermis were then reapproximated.  After the deeper layers were approximated the epidermis was reapproximated with particular care given to realign the vermillion border.
Non-Graft Cartilage Fenestration Text: The cartilage was fenestrated with a 2mm punch biopsy to help facilitate healing.
Advancement-Rotation Flap Text: The defect edges were debeveled with a #15 scalpel blade.  Given the location of the defect, shape of the defect and the proximity to free margins an advancement-rotation flap was deemed most appropriate.  Using a sterile surgical marker, an appropriate advancement flap was drawn incorporating the defect and placing the expected incisions within the relaxed skin tension lines where possible.    The area thus outlined was incised deep to adipose tissue with a #15 scalpel blade.  The skin margins were undermined to an appropriate distance in all directions utilizing iris scissors.
Secondary Intention Text (Leave Blank If You Do Not Want): The defect will heal with secondary intention.
Consent: The rationale for reconstruction was explained to the patient and consent was obtained. The risks, benefits and alternatives to therapy were discussed in detail. Specifically, the risks of infection, scarring, bleeding, bruising/swelling, prolonged wound healing, allergy to anesthesia, nerve injury, sensory changes, graft necrosis, and pigment changes were addressed. Prior to the procedure, the treatment site was clearly identified and confirmed by the patient. All components of Universal Protocol/PAUSE Rule completed.
Type Of Previous Surgery (Optional- Ie Mohs Surgery): Mohs Surgery
Advancement Flap (Double) Text: The defect edges were debeveled with a #15 scalpel blade.  Given the location of the defect and the proximity to free margins a double advancement flap was deemed most appropriate.  Using a sterile surgical marker, the appropriate advancement flaps were drawn incorporating the defect and placing the expected incisions within the relaxed skin tension lines where possible.    The area thus outlined was incised deep to adipose tissue with a #15 scalpel blade.  The skin margins were undermined to an appropriate distance in all directions utilizing iris scissors.
Pre-Op Size Of Lesion Removed (Optional): 1.6
Melolabial Transposition Flap Text: The defect edges were debeveled with a #15 scalpel blade.  Given the location of the defect and the proximity to free margins a melolabial flap was deemed most appropriate.  Using a sterile surgical marker, an appropriate melolabial transposition flap was drawn incorporating the defect.    The area thus outlined was incised deep to adipose tissue with a #15 scalpel blade.  The skin margins were undermined to an appropriate distance in all directions utilizing iris scissors.
Primary Defect Length (In Cm): 3.2
Suture Removal: 7 days
Melolabial Interpolation Flap Division And Inset Text: Division and inset of the melolabial interpolation flap was performed to achieve optimal aesthetic result, restore normal anatomic appearance and avoid distortion of normal anatomy, expedite and facilitate wound healing, achieve optimal functional result and because linear closure either not possible or would produce suboptimal result. The patient was prepped and draped in the usual manner. The pedicle was infiltrated with local anesthesia. The pedicle was sectioned with a #15 blade. The pedicle was de-bulked and trimmed to match the shape of the defect. Hemostasis was achieved. The flap donor site and free margin of the flap were secured with deep buried sutures and the wound edges were re-approximated.
Partial Purse String (Intermediate) Text: Given the location of the defect and the characteristics of the surrounding skin an intermediate purse string closure was deemed most appropriate.  Undermining was performed circumfirentially around the surgical defect.  A purse string suture was then placed and tightened. Wound tension only allowed a partial closure of the circular defect.
Closure 4 Information: This tab is for additional flaps and grafts above and beyond our usual structured repairs.  Please note if you enter information here it will not currently bill and you will need to add the billing information manually.
A-T Advancement Flap Text: The defect edges were debeveled with a #15 scalpel blade.  Given the location of the defect, shape of the defect and the proximity to free margins an A-T advancement flap was deemed most appropriate.  Using a sterile surgical marker, an appropriate advancement flap was drawn incorporating the defect and placing the expected incisions within the relaxed skin tension lines where possible.    The area thus outlined was incised deep to adipose tissue with a #15 scalpel blade.  The skin margins were undermined to an appropriate distance in all directions utilizing iris scissors.
Wound Care: Vaseline
Epidermal Closure: running
Ftsg Text: The defect edges were debeveled with a #15 scalpel blade.  Given the location of the defect, shape of the defect and the proximity to free margins a full thickness skin graft was deemed most appropriate.  Using a sterile surgical marker, the primary defect shape was transferred to the donor site. The area thus outlined was incised deep to adipose tissue with a #15 scalpel blade.  The harvested graft was then trimmed of adipose tissue until only dermis and epidermis was left.  The skin margins of the secondary defect were undermined to an appropriate distance in all directions utilizing iris scissors.  The secondary defect was closed with interrupted buried subcutaneous sutures.  The skin edges were then re-apposed with running  sutures.  The skin graft was then placed in the primary defect and oriented appropriately.
Hemostasis: Electrocautery
Muscle Hinge Flap Text: The defect edges were debeveled with a #15 scalpel blade.  Given the size, depth and location of the defect and the proximity to free margins a muscle hinge flap was deemed most appropriate.  Using a sterile surgical marker, an appropriate hinge flap was drawn incorporating the defect. The area thus outlined was incised with a #15 scalpel blade.  The skin margins were undermined to an appropriate distance in all directions utilizing iris scissors.
Alar Island Pedicle Flap Text: The defect edges were debeveled with a #15 scalpel blade.  Given the location of the defect, shape of the defect and the proximity to the alar rim an island pedicle advancement flap was deemed most appropriate.  Using a sterile surgical marker, an appropriate advancement flap was drawn incorporating the defect, outlining the appropriate donor tissue and placing the expected incisions within the nasal ala running parallel to the alar rim. The area thus outlined was incised with a #15 scalpel blade.  The skin margins were undermined minimally to an appropriate distance in all directions around the primary defect and laterally outward around the island pedicle utilizing iris scissors.  There was minimal undermining beneath the pedicle flap.
Advancement Flap (Single) Text: The defect edges were debeveled with a #15 scalpel blade.  Given the location of the defect and the proximity to free margins a single advancement flap was deemed most appropriate.  Using a sterile surgical marker, an appropriate advancement flap was drawn incorporating the defect and placing the expected incisions within the relaxed skin tension lines where possible.    The area thus outlined was incised deep to adipose tissue with a #15 scalpel blade.  The skin margins were undermined to an appropriate distance in all directions utilizing iris scissors.
Island Pedicle Flap With Canthal Suspension Text: The defect edges were debeveled with a #15 scalpel blade.  Given the location of the defect, shape of the defect and the proximity to free margins an island pedicle advancement flap was deemed most appropriate.  Using a sterile surgical marker, an appropriate advancement flap was drawn incorporating the defect, outlining the appropriate donor tissue and placing the expected incisions within the relaxed skin tension lines where possible. The area thus outlined was incised deep to adipose tissue with a #15 scalpel blade.  The skin margins were undermined to an appropriate distance in all directions around the primary defect and laterally outward around the island pedicle utilizing iris scissors.  There was minimal undermining beneath the pedicle flap. A suspension suture was placed in the canthal tendon to prevent tension and prevent ectropion.
Skin Substitute Text: The defect edges were debeveled with a #15 scalpel blade.  Given the location of the defect, shape of the defect and the proximity to free margins a skin substitute graft was deemed most appropriate.  The graft material was trimmed to fit the size of the defect. The graft was then placed in the primary defect and oriented appropriately.
Referred To Otolaryngology For Closure Text (Leave Blank If You Do Not Want): After obtaining clear surgical margins the patient was sent to otolaryngology for surgical repair.  The patient understands they will receive post-surgical care and follow-up from the referring physician's office.
Z Plasty Text: The lesion was extirpated to the level of the fat with a #15 scalpel blade.  Given the location of the defect, shape of the defect and the proximity to free margins a Z-plasty was deemed most appropriate for repair.  Using a sterile surgical marker, the appropriate transposition arms of the Z-plasty were drawn incorporating the defect and placing the expected incisions within the relaxed skin tension lines where possible.    The area thus outlined was incised deep to adipose tissue with a #15 scalpel blade.  The skin margins were undermined to an appropriate distance in all directions utilizing iris scissors.  The opposing transposition arms were then transposed into place in opposite direction and anchored with interrupted buried subcutaneous sutures.
Xenograft Text: The defect edges were debeveled with a #15 scalpel blade.  Given the location of the defect, shape of the defect and the proximity to free margins a xenograft was deemed most appropriate.  The graft was then trimmed to fit the size of the defect.  The graft was then placed in the primary defect and oriented appropriately.
Repair Type: Complex Repair
Bilobed Transposition Flap Text: The defect edges were debeveled with a #15 scalpel blade.  Given the location of the defect and the proximity to free margins a bilobed transposition flap was deemed most appropriate.  Using a sterile surgical marker, an appropriate bilobe flap drawn around the defect.    The area thus outlined was incised deep to adipose tissue with a #15 scalpel blade.  The skin margins were undermined to an appropriate distance in all directions utilizing iris scissors.
Estimated Blood Loss (Cc): minimal
Bilobed Flap Text: The defect edges were debeveled with a #15 scalpel blade.  Given the location of the defect and the proximity to free margins a bilobe flap was deemed most appropriate.  Using a sterile surgical marker, an appropriate bilobe flap drawn around the defect.    The area thus outlined was incised deep to adipose tissue with a #15 scalpel blade.  The skin margins were undermined to an appropriate distance in all directions utilizing iris scissors.
Purse String (Simple) Text: Given the location of the defect and the characteristics of the surrounding skin a pursestring closure was deemed most appropriate.  Undermining was performed circumfirentially around the surgical defect.  A purstring suture was then placed and tightened.
Cartilage Graft Text: The defect edges were debeveled with a #15 scalpel blade.  Given the location of the defect, shape of the defect, the fact the defect involved a full thickness cartilage defect a cartilage graft was deemed most appropriate.  An appropriate donor site was identified, cleansed, and anesthetized. The cartilage graft was then harvested and transferred to the recipient site, oriented appropriately and then sutured into place.  The secondary defect was then repaired using a primary closure.
Posterior Auricular Interpolation Flap Division And Inset Text: Division and inset of the posterior auricular interpolation flap was performed to achieve optimal aesthetic result, restore normal anatomic appearance and avoid distortion of normal anatomy, expedite and facilitate wound healing, achieve optimal functional result and because linear closure either not possible or would produce suboptimal result. The patient was prepped and draped in the usual manner. The pedicle was infiltrated with local anesthesia. The pedicle was sectioned with a #15 blade. The pedicle was de-bulked and trimmed to match the shape of the defect. Hemostasis was achieved. The flap donor site and free margin of the flap were secured with deep buried sutures and the wound edges were re-approximated.
Closure 3 Information: This tab is for additional flaps and grafts above and beyond our usual structured repairs.  Please note if you enter information here it will not currently bill and you will need to add the billing information manually.
Mastoid Interpolation Flap Division And Inset Text: Division and inset of the mastoid interpolation flap was performed to achieve optimal aesthetic result, restore normal anatomic appearance and avoid distortion of normal anatomy, expedite and facilitate wound healing, achieve optimal functional result and because linear closure either not possible or would produce suboptimal result. The patient was prepped and draped in the usual manner. The pedicle was infiltrated with local anesthesia. The pedicle was sectioned with a #15 blade. The pedicle was de-bulked and trimmed to match the shape of the defect. Hemostasis was achieved. The flap donor site and free margin of the flap were secured with deep buried sutures and the wound edges were re-approximated.
Island Pedicle Flap-Requiring Vessel Identification Text: The defect edges were debeveled with a #15 scalpel blade.  Given the location of the defect, shape of the defect and the proximity to free margins an island pedicle advancement flap was deemed most appropriate.  Using a sterile surgical marker, an appropriate advancement flap was drawn, based on the axial vessel mentioned above, incorporating the defect, outlining the appropriate donor tissue and placing the expected incisions within the relaxed skin tension lines where possible.    The area thus outlined was incised deep to adipose tissue with a #15 scalpel blade.  The skin margins were undermined to an appropriate distance in all directions around the primary defect and laterally outward around the island pedicle utilizing iris scissors.  There was minimal undermining beneath the pedicle flap.
Cheiloplasty (Complex) Text: A decision was made to reconstruct the defect with a  cheiloplasty.  The defect was undermined extensively.  Additional obicularis oris muscle was excised with a 15 blade scalpel.  The defect was converted into a full thickness wedge to facilite a better cosmetic result.  Small vessels were then tied off with 5-0 monocyrl. The obicularis oris, superficial fascia, adipose and dermis were then reapproximated.  After the deeper layers were approximated the epidermis was reapproximated with particular care given to realign the vermillion border.
Skin Substitute Paste Text: The defect edges were debeveled with a #15 scalpel blade.  Given the location of the defect, shape of the defect and the proximity to free margins a skin substitute micronized graft was deemed most appropriate.  The entire vial contents were admixed with 0.5ccs of sterile saline, formed into a paste and then evenly spread over the entire wound bed.
Ear Star Wedge Flap Text: The defect edges were debeveled with a #11 blade scalpel.  Given the location of the defect and the proximity to free margins (helical rim) an ear star wedge flap was deemed most appropriate.  Using a sterile surgical marker, the appropriate flap was drawn incorporating the defect and placing the expected incisions between the helical rim and antihelix where possible.  The area thus outlined was incised through and through with a #11 scalpel blade.
Full Thickness Lip Wedge Repair (Flap) Text: Given the location of the defect and the proximity to free margins a full thickness wedge repair was deemed most appropriate.  Using a sterile surgical marker, the appropriate repair was drawn incorporating the defect and placing the expected incisions perpendicular to the vermilion border.  The vermilion border was also meticulously outlined to ensure appropriate reapproximation during the repair.  The area thus outlined was incised through and through with a #15 scalpel blade.  The muscularis and dermis were reaproximated with deep sutures following hemostasis. Care was taken to realign the vermilion border before proceeding with the superficial closure.  Once the vermilion was realigned the superfical and mucosal closure was finished.
Double Island Pedicle Flap Text: The defect edges were debeveled with a #15 scalpel blade.  Given the location of the defect, shape of the defect and the proximity to free margins a double island pedicle advancement flap was deemed most appropriate.  Using a sterile surgical marker, an appropriate advancement flap was drawn incorporating the defect, outlining the appropriate donor tissue and placing the expected incisions within the relaxed skin tension lines where possible.    The area thus outlined was incised deep to adipose tissue with a #15 scalpel blade.  The skin margins were undermined to an appropriate distance in all directions around the primary defect and laterally outward around the island pedicle utilizing iris scissors.  There was minimal undermining beneath the pedicle flap.
Cheek Interpolation Flap Division And Inset Text: Division and inset of the cheek interpolation flap was performed to achieve optimal aesthetic result, restore normal anatomic appearance and avoid distortion of normal anatomy, expedite and facilitate wound healing, achieve optimal functional result and because linear closure either not possible or would produce suboptimal result. The patient was prepped and draped in the usual manner. The pedicle was infiltrated with local anesthesia. The pedicle was sectioned with a #15 blade. The pedicle was de-bulked and trimmed to match the shape of the defect. Hemostasis was achieved. The flap donor site and free margin of the flap were secured with deep buried sutures and the wound edges were re-approximated.
V-Y Flap Text: The defect edges were debeveled with a #15 scalpel blade.  Given the location of the defect, shape of the defect and the proximity to free margins a V-Y flap was deemed most appropriate.  Using a sterile surgical marker, an appropriate advancement flap was drawn incorporating the defect and placing the expected incisions within the relaxed skin tension lines where possible.    The area thus outlined was incised deep to adipose tissue with a #15 scalpel blade.  The skin margins were undermined to an appropriate distance in all directions utilizing iris scissors.
Show Asc Variables: Yes
S Plasty Text: Given the location and shape of the defect, and the orientation of relaxed skin tension lines, an S-plasty was deemed most appropriate for repair.  Using a sterile surgical marker, the appropriate outline of the S-plasty was drawn, incorporating the defect and placing the expected incisions within the relaxed skin tension lines where possible.  The area thus outlined was incised deep to adipose tissue with a #15 scalpel blade.  The skin margins were undermined to an appropriate distance in all directions utilizing iris scissors. The skin flaps were advanced over the defect.  The opposing margins were then approximated with interrupted buried subcutaneous sutures.
Paramedian Forehead Flap Division And Inset Text: Division and inset of the paramedian forehead flap was performed to achieve optimal aesthetic result, restore normal anatomic appearance and avoid distortion of normal anatomy, expedite and facilitate wound healing, achieve optimal functional result and because linear closure either not possible or would produce suboptimal result. The patient was prepped and draped in the usual manner. The pedicle was infiltrated with local anesthesia. The pedicle was sectioned with a #15 blade. The pedicle was de-bulked and trimmed to match the shape of the defect. Hemostasis was achieved. The flap donor site and free margin of the flap were secured with deep buried sutures and the wound edges were re-approximated.
Composite Graft Text: The defect edges were debeveled with a #15 scalpel blade.  Given the location of the defect, shape of the defect, the proximity to free margins and the fact the defect was full thickness a composite graft was deemed most appropriate.  The defect was outline and then transferred to the donor site.  A full thickness graft was then excised from the donor site. The graft was then placed in the primary defect, oriented appropriately and then sutured into place.  The secondary defect was then repaired using a primary closure.
Spiral Flap Text: The defect edges were debeveled with a #15 scalpel blade.  Given the location of the defect, shape of the defect and the proximity to free margins a spiral flap was deemed most appropriate.  Using a sterile surgical marker, an appropriate rotation flap was drawn incorporating the defect and placing the expected incisions within the relaxed skin tension lines where possible. The area thus outlined was incised deep to adipose tissue with a #15 scalpel blade.  The skin margins were undermined to an appropriate distance in all directions utilizing iris scissors.
Localized Dermabrasion Text: The patient was draped in routine manner.  Localized dermabrasion using 3 x 17 mm wire brush was performed in routine manner to papillary dermis. This spot dermabrasion is being performed to complete skin cancer reconstruction. It also will eliminate the other sun damaged precancerous cells that are known to be part of the regional effect of a lifetime's worth of sun exposure. This localized dermabrasion is therapeutic and should not be considered cosmetic in any regard.
Tarsorrhaphy Text: A tarsorrhaphy was performed using Frost sutures.
Number Of Stages Required To Clear Tumor (Optional): 1
Graft Donor Site Dermal Sutures (Optional): 4-0 PDS
Closure 2 Information: This tab is for additional flaps and grafts, including complex repair and grafts and complex repair and flaps. You can also specify a different location for the additional defect, if the location is the same you do not need to select a new one. We will insert the automated text for the repair you select below just as we do for solitary flaps and grafts. Please note that at this time if you select a location with a different insurance zone you will need to override the ICD10 and CPT if appropriate.
Simple / Intermediate / Complex Repair - Final Wound Length In Cm: 8.4
Referred To Plastics For Closure Text (Leave Blank If You Do Not Want): After obtaining clear surgical margins the patient was sent to plastics for surgical repair.  The patient understands they will receive post-surgical care and follow-up from the referring physician's office.
Purse String (Intermediate) Text: Given the location of the defect and the characteristics of the surrounding skin a pursestring intermediate closure was deemed most appropriate.  Undermining was performed circumfirentially around the surgical defect.  A purstring suture was then placed and tightened.
Helical Rim Advancement Flap Text: The defect edges were debeveled with a #15 blade scalpel.  Given the location of the defect and the proximity to free margins (helical rim) a double helical rim advancement flap was deemed most appropriate.  Using a sterile surgical marker, the appropriate advancement flaps were drawn incorporating the defect and placing the expected incisions between the helical rim and antihelix where possible.  The area thus outlined was incised through and through with a #15 scalpel blade.  With a skin hook and iris scissors, the flaps were gently and sharply undermined and freed up.
Modified Advancement Flap Text: The defect edges were debeveled with a #15 scalpel blade.  Given the location of the defect, shape of the defect and the proximity to free margins a modified advancement flap was deemed most appropriate.  Using a sterile surgical marker, an appropriate advancement flap was drawn incorporating the defect and placing the expected incisions within the relaxed skin tension lines where possible.    The area thus outlined was incised deep to adipose tissue with a #15 scalpel blade.  The skin margins were undermined to an appropriate distance in all directions utilizing iris scissors.
Banner Transposition Flap Text: The defect edges were debeveled with a #15 scalpel blade.  Given the location of the defect and the proximity to free margins a Banner transposition flap was deemed most appropriate.  Using a sterile surgical marker, an appropriate flap drawn around the defect. The area thus outlined was incised deep to adipose tissue with a #15 scalpel blade.  The skin margins were undermined to an appropriate distance in all directions utilizing iris scissors.
Dressing: pressure dressing with telfa
Mercedes Flap Text: The defect edges were debeveled with a #15 scalpel blade.  Given the location of the defect, shape of the defect and the proximity to free margins a Mercedes flap was deemed most appropriate.  Using a sterile surgical marker, an appropriate advancement flap was drawn incorporating the defect and placing the expected incisions within the relaxed skin tension lines where possible. The area thus outlined was incised deep to adipose tissue with a #15 scalpel blade.  The skin margins were undermined to an appropriate distance in all directions utilizing iris scissors.
Ear Wedge Repair Text: A wedge excision was completed by carrying down an excision through the full thickness of the ear and cartilage with an inward facing Burow's triangle. The wound was then closed in a layered fashion.
W Plasty Text: The lesion was extirpated to the level of the fat with a #15 scalpel blade.  Given the location of the defect, shape of the defect and the proximity to free margins a W-plasty was deemed most appropriate for repair.  Using a sterile surgical marker, the appropriate transposition arms of the W-plasty were drawn incorporating the defect and placing the expected incisions within the relaxed skin tension lines where possible.    The area thus outlined was incised deep to adipose tissue with a #15 scalpel blade.  The skin margins were undermined to an appropriate distance in all directions utilizing iris scissors.  The opposing transposition arms were then transposed into place in opposite direction and anchored with interrupted buried subcutaneous sutures.
Graft Cartilage Fenestration Text: The cartilage was fenestrated with a 2mm punch biopsy to help facilitate graft survival and healing.
Dermal Autograft Text: The defect edges were debeveled with a #15 scalpel blade.  Given the location of the defect, shape of the defect and the proximity to free margins a dermal autograft was deemed most appropriate.  Using a sterile surgical marker, the primary defect shape was transferred to the donor site. The area thus outlined was incised deep to adipose tissue with a #15 scalpel blade.  The harvested graft was then trimmed of adipose and epidermal tissue until only dermis was left.  The skin graft was then placed in the primary defect and oriented appropriately.
Bilateral Helical Rim Advancement Flap Text: The defect edges were debeveled with a #15 blade scalpel.  Given the location of the defect and the proximity to free margins (helical rim) a bilateral helical rim advancement flap was deemed most appropriate.  Using a sterile surgical marker, the appropriate advancement flaps were drawn incorporating the defect and placing the expected incisions between the helical rim and antihelix where possible.  The area thus outlined was incised through and through with a #15 scalpel blade.  With a skin hook and iris scissors, the flaps were gently and sharply undermined and freed up.
Anesthesia Type: 1% lidocaine with epinephrine and a 1:10 solution of 8.4% sodium bicarbonate
O-L Flap Text: The defect edges were debeveled with a #15 scalpel blade.  Given the location of the defect, shape of the defect and the proximity to free margins an O-L flap was deemed most appropriate.  Using a sterile surgical marker, an appropriate advancement flap was drawn incorporating the defect and placing the expected incisions within the relaxed skin tension lines where possible.    The area thus outlined was incised deep to adipose tissue with a #15 scalpel blade.  The skin margins were undermined to an appropriate distance in all directions utilizing iris scissors.
Additional Anesthesia Volume In Cc: 6
Referred To Asc For Closure Text (Leave Blank If You Do Not Want): After obtaining clear surgical margins the patient was sent to an ASC for surgical repair.  The patient understands they will receive post-surgical care and follow-up from the ASC physician.
Dorsal Nasal Flap Text: The defect edges were debeveled with a #15 scalpel blade.  Given the location of the defect and the proximity to free margins a dorsal nasal flap was deemed most appropriate.  Using a sterile surgical marker, an appropriate dorsal nasal flap was drawn around the defect.    The area thus outlined was incised deep to adipose tissue with a #15 scalpel blade.  The skin margins were undermined to an appropriate distance in all directions utilizing iris scissors.
Star Wedge Flap Text: The defect edges were debeveled with a #15 scalpel blade.  Given the location of the defect, shape of the defect and the proximity to free margins a star wedge flap was deemed most appropriate.  Using a sterile surgical marker, an appropriate rotation flap was drawn incorporating the defect and placing the expected incisions within the relaxed skin tension lines where possible. The area thus outlined was incised deep to adipose tissue with a #15 scalpel blade.  The skin margins were undermined to an appropriate distance in all directions utilizing iris scissors.
Rotation Flap Text: The defect edges were debeveled with a #15 scalpel blade.  Given the location of the defect, shape of the defect and the proximity to free margins a rotation flap was deemed most appropriate.  Using a sterile surgical marker, an appropriate rotation flap was drawn incorporating the defect and placing the expected incisions within the relaxed skin tension lines where possible.    The area thus outlined was incised deep to adipose tissue with a #15 scalpel blade.  The skin margins were undermined to an appropriate distance in all directions utilizing iris scissors.
Complex Repair And Flap Additional Text (Will Appearing After The Standard Complex Repair Text): The complex repair was not sufficient to completely close the primary defect. The remaining additional defect was repaired with the flap mentioned below.

## 2018-11-02 NOTE — PROCEDURE: MOHS SURGERY
Tissue Cultured Epidermal Autograft Text: The defect edges were debeveled with a #15 scalpel blade.  Given the location of the defect, shape of the defect and the proximity to free margins a tissue cultured epidermal autograft was deemed most appropriate.  The graft was then trimmed to fit the size of the defect.  The graft was then placed in the primary defect and oriented appropriately.
Plastic Surgeon Procedure Text (E): After obtaining clear surgical margins the patient was sent to plastics for surgical repair.  The patient understands they will receive post-surgical care and follow-up from the referring physician's office.
Repair Type: Referred to ASC for closure
Non-Graft Cartilage Fenestration Text: The cartilage was fenestrated with a 2mm punch biopsy to help facilitate healing.
Cartilage Fenestration Performed?: No
Stage 11: Additional Anesthesia Type: 1% lidocaine with epinephrine
Consent (Temporal Branch)/Introductory Paragraph: The rationale for Mohs was explained to the patient and consent was obtained. The risks, benefits and alternatives to therapy were discussed in detail. Specifically, the risks of damage to the temporal branch of the facial nerve, infection, scarring, bleeding, prolonged wound healing, incomplete removal, allergy to anesthesia, and recurrence were addressed. Prior to the procedure, the treatment site was clearly identified and confirmed by the patient. All components of Universal Protocol/PAUSE Rule completed.
Stage 7: Additional Anesthesia Volume In Cc: 0
Referred To Mid-Level For Closure Text (Leave Blank If You Do Not Want): After obtaining clear surgical margins the patient was sent to a mid-level provider for surgical repair.  The patient understands they will receive post-surgical care and follow-up from the mid-level provider.
Display The Individual Mohs Indications As Separate Paragraphs: Yes
X Size Of Lesion In Cm (Optional): 1.6
V-Y Plasty Text: The defect edges were debeveled with a #15 scalpel blade.  Given the location of the defect, shape of the defect and the proximity to free margins an V-Y advancement flap was deemed most appropriate.  Using a sterile surgical marker, an appropriate advancement flap was drawn incorporating the defect and placing the expected incisions within the relaxed skin tension lines where possible.    The area thus outlined was incised deep to adipose tissue with a #15 scalpel blade.  The skin margins were undermined to an appropriate distance in all directions utilizing iris scissors.
Asc Procedure Text (D): After obtaining clear surgical margins the patient was sent to an ASC for surgical repair.  The patient understands they will receive post-surgical care and follow-up from the ASC physician.
Star Wedge Flap Text: The defect edges were debeveled with a #15 scalpel blade.  Given the location of the defect, shape of the defect and the proximity to free margins a star wedge flap was deemed most appropriate.  Using a sterile surgical marker, an appropriate rotation flap was drawn incorporating the defect and placing the expected incisions within the relaxed skin tension lines where possible. The area thus outlined was incised deep to adipose tissue with a #15 scalpel blade.  The skin margins were undermined to an appropriate distance in all directions utilizing iris scissors.
Advancement-Rotation Flap Text: The defect edges were debeveled with a #15 scalpel blade.  Given the location of the defect, shape of the defect and the proximity to free margins an advancement-rotation flap was deemed most appropriate.  Using a sterile surgical marker, an appropriate flap was drawn incorporating the defect and placing the expected incisions within the relaxed skin tension lines where possible. The area thus outlined was incised deep to adipose tissue with a #15 scalpel blade.  The skin margins were undermined to an appropriate distance in all directions utilizing iris scissors.
Eye Protection Verbiage: Before proceeding with the stage, a plastic scleral shield was inserted. The globe was anesthetized with a few drops of 1% lidocaine with 1:100,000 epinephrine. Then, an appropriate sized scleral shield was chosen and coated with lacrilube ointment. The shield was gently inserted and left in place for the duration of each stage. After the stage was completed, the shield was gently removed.
Partial Purse String (Intermediate) Text: Given the location of the defect and the characteristics of the surrounding skin an intermediate purse string closure was deemed most appropriate.  Undermining was performed circumfirentially around the surgical defect.  A purse string suture was then placed and tightened. Wound tension only allowed a partial closure of the circular defect.
Lazy S Intermediate Repair Preamble Text (Leave Blank If You Do Not Want): Undermining was performed with blunt dissection.
Mohs Rapid Report Verbiage: The area of clinically evident tumor was marked with skin marking ink and appropriately hatched.  The initial incision was made following the Mohs approach through the skin.  The specimen was taken to the lab, divided into the necessary number of pieces, chromacoded and processed according to the Mohs protocol.  This was repeated in successive stages until a tumor free defect was achieved.
Provider Procedure Text (A): After obtaining clear surgical margins the defect was repaired by another provider.
Island Pedicle Flap-Requiring Vessel Identification Text: The defect edges were debeveled with a #15 scalpel blade.  Given the location of the defect, shape of the defect and the proximity to free margins an island pedicle advancement flap was deemed most appropriate.  Using a sterile surgical marker, an appropriate advancement flap was drawn, based on the axial vessel mentioned above, incorporating the defect, outlining the appropriate donor tissue and placing the expected incisions within the relaxed skin tension lines where possible.    The area thus outlined was incised deep to adipose tissue with a #15 scalpel blade.  The skin margins were undermined to an appropriate distance in all directions around the primary defect and laterally outward around the island pedicle utilizing iris scissors.  There was minimal undermining beneath the pedicle flap.
Banner Transposition Flap Text: The defect edges were debeveled with a #15 scalpel blade.  Given the location of the defect and the proximity to free margins a Banner transposition flap was deemed most appropriate.  Using a sterile surgical marker, an appropriate flap drawn around the defect. The area thus outlined was incised deep to adipose tissue with a #15 scalpel blade.  The skin margins were undermined to an appropriate distance in all directions utilizing iris scissors.
Mid-Level Procedure Text (A): After obtaining clear surgical margins the patient was sent to a mid-level provider for surgical repair.  The patient understands they will receive post-surgical care and follow-up from the mid-level provider.
Manual Repair Warning Statement: We plan on removing the manually selected variable below in favor of our much easier automatic structured text blocks found in the previous tab. We decided to do this to help make the flow better and give you the full power of structured data. Manual selection is never going to be ideal in our platform and I would encourage you to avoid using manual selection from this point on, especially since I will be sunsetting this feature. It is important that you do one of two things with the customized text below. First, you can save all of the text in a word file so you can have it for future reference. Second, transfer the text to the appropriate area in the Library tab. Lastly, if there is a flap or graft type which we do not have you need to let us know right away so I can add it in before the variable is hidden. No need to panic, we plan to give you roughly 6 months to make the change.
Oculoplastic Surgeon Procedure Text (C): After obtaining clear surgical margins the patient was sent to oculoplastics for surgical repair.  The patient understands they will receive post-surgical care and follow-up from the referring physician's office.
Donor Site Anesthesia Type: same as repair anesthesia
Consent (Ear)/Introductory Paragraph: The rationale for Mohs was explained to the patient and consent was obtained. The risks, benefits and alternatives to therapy were discussed in detail. Specifically, the risks of ear deformity, infection, scarring, bleeding, prolonged wound healing, incomplete removal, allergy to anesthesia, nerve injury and recurrence were addressed. Prior to the procedure, the treatment site was clearly identified and confirmed by the patient. All components of Universal Protocol/PAUSE Rule completed.
Oculoplastic Surgeon Procedure Text (B): After obtaining clear surgical margins the patient was sent to oculoplastics for surgical repair.  The patient understands they will receive post-surgical care and follow-up from the referring physician's office.
Advancement Flap (Double) Text: The defect edges were debeveled with a #15 scalpel blade.  Given the location of the defect and the proximity to free margins a double advancement flap was deemed most appropriate.  Using a sterile surgical marker, the appropriate advancement flaps were drawn incorporating the defect and placing the expected incisions within the relaxed skin tension lines where possible.    The area thus outlined was incised deep to adipose tissue with a #15 scalpel blade.  The skin margins were undermined to an appropriate distance in all directions utilizing iris scissors.
Same Histology In Subsequent Stages Text: The pattern and morphology of the tumor is as described in the first stage.
Tumor Debulked?: curette
Referred To Asc For Closure Text (Leave Blank If You Do Not Want): After obtaining clear surgical margins the patient was sent to an ASC for surgical repair.  The patient understands they will receive post-surgical care and follow-up from the ASC physician.
Interpolation Flap Text: A decision was made to reconstruct the defect utilizing an interpolation axial flap and a staged reconstruction.  A telfa template was made of the defect.  This telfa template was then used to outline the interpolation flap.  The donor area for the pedicle flap was then injected with anesthesia.  The flap was excised through the skin and subcutaneous tissue down to the layer of the underlying musculature.  The interpolation flap was carefully excised within this deep plane to maintain its blood supply.  The edges of the donor site were undermined.   The donor site was closed in a primary fashion.  The pedicle was then rotated into position and sutured.  Once the tube was sutured into place, adequate blood supply was confirmed with blanching and refill.  The pedicle was then wrapped with xeroform gauze and dressed appropriately with a telfa and gauze bandage to ensure continued blood supply and protect the attached pedicle.
Epidermal Closure Graft Donor Site (Optional): simple interrupted
Trilobed Flap Text: The defect edges were debeveled with a #15 scalpel blade.  Given the location of the defect and the proximity to free margins a trilobed flap was deemed most appropriate.  Using a sterile surgical marker, an appropriate trilobed flap drawn around the defect.    The area thus outlined was incised deep to adipose tissue with a #15 scalpel blade.  The skin margins were undermined to an appropriate distance in all directions utilizing iris scissors.
Mucosal Advancement Flap Text: Given the location of the defect, shape of the defect and the proximity to free margins a mucosal advancement flap was deemed most appropriate. Incisions were made with a 15 blade scalpel in the appropriate fashion along the cutaneous vermillion border and the mucosal lip. The remaining actinically damaged mucosal tissue was excised.  The mucosal advancement flap was then elevated to the gingival sulcus with care taken to preserve the neurovascular structures and advanced into the primary defect. Care was taken to ensure that precise realignment of the vermillion border was achieved.
Perineural Invasion (For Histology - Be Specific If Possible): absent
Rhombic Flap Text: The defect edges were debeveled with a #15 scalpel blade.  Given the location of the defect and the proximity to free margins a rhombic flap was deemed most appropriate.  Using a sterile surgical marker, an appropriate rhombic flap was drawn incorporating the defect.    The area thus outlined was incised deep to adipose tissue with a #15 scalpel blade.  The skin margins were undermined to an appropriate distance in all directions utilizing iris scissors.
Alternatives Discussed Intro (Do Not Add Period): I discussed alternative treatments to Mohs surgery and specifically discussed the risks and benefits of
Lazy S Complex Repair Preamble Text (Leave Blank If You Do Not Want): Extensive wide undermining was performed.
Split-Thickness Skin Graft Text: The defect edges were debeveled with a #15 scalpel blade.  Given the location of the defect, shape of the defect and the proximity to free margins a split thickness skin graft was deemed most appropriate.  Using a sterile surgical marker, the primary defect shape was transferred to the donor site. The split thickness graft was then harvested.  The skin graft was then placed in the primary defect and oriented appropriately.
Otolaryngologist Procedure Text (D): After obtaining clear surgical margins the patient was sent to otolaryngology for surgical repair.  The patient understands they will receive post-surgical care and follow-up from the referring physician's office.
Cheiloplasty (Less Than 50%) Text: A decision was made to reconstruct the defect with a  cheiloplasty.  The defect was undermined extensively.  Additional obicularis oris muscle was excised with a 15 blade scalpel.  The defect was converted into a full thickness wedge, of less than 50% of the vertical height of the lip, to facilite a better cosmetic result.  Small vessels were then tied off with 5-0 monocyrl. The obicularis oris, superficial fascia, adipose and dermis were then reapproximated.  After the deeper layers were approximated the epidermis was reapproximated with particular care given to realign the vermillion border.
Hemostasis: Electrocautery
Consent (Scalp)/Introductory Paragraph: The rationale for Mohs was explained to the patient and consent was obtained. The risks, benefits and alternatives to therapy were discussed in detail. Specifically, the risks of changes in hair growth pattern secondary to repair, infection, scarring, bleeding, prolonged wound healing, incomplete removal, allergy to anesthesia, nerve injury and recurrence were addressed. Prior to the procedure, the treatment site was clearly identified and confirmed by the patient. All components of Universal Protocol/PAUSE Rule completed.
Island Pedicle Flap With Canthal Suspension Text: The defect edges were debeveled with a #15 scalpel blade.  Given the location of the defect, shape of the defect and the proximity to free margins an island pedicle advancement flap was deemed most appropriate.  Using a sterile surgical marker, an appropriate advancement flap was drawn incorporating the defect, outlining the appropriate donor tissue and placing the expected incisions within the relaxed skin tension lines where possible. The area thus outlined was incised deep to adipose tissue with a #15 scalpel blade.  The skin margins were undermined to an appropriate distance in all directions around the primary defect and laterally outward around the island pedicle utilizing iris scissors.  There was minimal undermining beneath the pedicle flap. A suspension suture was placed in the canthal tendon to prevent tension and prevent ectropion.
Ear Star Wedge Flap Text: The defect edges were debeveled with a #15 blade scalpel.  Given the location of the defect and the proximity to free margins (helical rim) an ear star wedge flap was deemed most appropriate.  Using a sterile surgical marker, the appropriate flap was drawn incorporating the defect and placing the expected incisions between the helical rim and antihelix where possible.  The area thus outlined was incised through and through with a #15 scalpel blade.
Paramedian Forehead Flap Text: A decision was made to reconstruct the defect utilizing an interpolation axial flap and a staged reconstruction.  A telfa template was made of the defect.  This telfa template was then used to outline the paramedian forehead pedicle flap.  The donor area for the pedicle flap was then injected with anesthesia.  The flap was excised through the skin and subcutaneous tissue down to the layer of the underlying musculature.  The pedicle flap was carefully excised within this deep plane to maintain its blood supply.  The edges of the donor site were undermined.   The donor site was closed in a primary fashion.  The pedicle was then rotated into position and sutured.  Once the tube was sutured into place, adequate blood supply was confirmed with blanching and refill.  The pedicle was then wrapped with xeroform gauze and dressed appropriately with a telfa and gauze bandage to ensure continued blood supply and protect the attached pedicle.
V-Y Flap Text: The defect edges were debeveled with a #15 scalpel blade.  Given the location of the defect, shape of the defect and the proximity to free margins a V-Y flap was deemed most appropriate.  Using a sterile surgical marker, an appropriate advancement flap was drawn incorporating the defect and placing the expected incisions within the relaxed skin tension lines where possible.    The area thus outlined was incised deep to adipose tissue with a #15 scalpel blade.  The skin margins were undermined to an appropriate distance in all directions utilizing iris scissors.
Keystone Flap Text: The defect edges were debeveled with a #15 scalpel blade.  Given the location of the defect, shape of the defect a keystone flap was deemed most appropriate.  Using a sterile surgical marker, an appropriate keystone flap was drawn incorporating the defect, outlining the appropriate donor tissue and placing the expected incisions within the relaxed skin tension lines where possible. The area thus outlined was incised deep to adipose tissue with a #15 scalpel blade.  The skin margins were undermined to an appropriate distance in all directions around the primary defect and laterally outward around the flap utilizing iris scissors.
Consent 3/Introductory Paragraph: I gave the patient a chance to ask questions they had about the procedure.  Following this I explained the Mohs procedure and consent was obtained. The risks, benefits and alternatives to therapy were discussed in detail. Specifically, the risks of infection, scarring, bleeding, prolonged wound healing, incomplete removal, allergy to anesthesia, nerve injury and recurrence were addressed. Prior to the procedure, the treatment site was clearly identified and confirmed by the patient. All components of Universal Protocol/PAUSE Rule completed.
Localized Dermabrasion Text: The patient was draped in routine manner.  Localized dermabrasion using 3 x 17 mm wire brush was performed in routine manner to papillary dermis. This spot dermabrasion is being performed to complete skin cancer reconstruction. It also will eliminate the other sun damaged precancerous cells that are known to be part of the regional effect of a lifetime's worth of sun exposure. This localized dermabrasion is therapeutic and should not be considered cosmetic in any regard.
Closure 2 Information: This tab is for additional flaps and grafts, including complex repair and grafts and complex repair and flaps. You can also specify a different location for the additional defect, if the location is the same you do not need to select a new one. We will insert the automated text for the repair you select below just as we do for solitary flaps and grafts. Please note that at this time if you select a location with a different insurance zone you will need to override the ICD10 and CPT if appropriate.
Body Location Override (Optional - Billing Will Still Be Based On Selected Body Map Location If Applicable): left jawline
Z Plasty Text: The lesion was extirpated to the level of the fat with a #15 scalpel blade.  Given the location of the defect, shape of the defect and the proximity to free margins a Z-plasty was deemed most appropriate for repair.  Using a sterile surgical marker, the appropriate transposition arms of the Z-plasty were drawn incorporating the defect and placing the expected incisions within the relaxed skin tension lines where possible.    The area thus outlined was incised deep to adipose tissue with a #15 scalpel blade.  The skin margins were undermined to an appropriate distance in all directions utilizing iris scissors.  The opposing transposition arms were then transposed into place in opposite direction and anchored with interrupted buried subcutaneous sutures.
Otolaryngologist Procedure Text (C): After obtaining clear surgical margins the patient was sent to otolaryngology for surgical repair.  The patient understands they will receive post-surgical care and follow-up from the referring physician's office.
Inflammation Suggestive Of Cancer Camouflage Histology Text: There was a dense lymphocytic infiltrate which prevented adequate histologic evaluation of adjacent structures.
Graft Basting Suture (Optional): 5-0 Plain Gut
Closure 3 Information: This tab is for additional flaps and grafts above and beyond our usual structured repairs.  Please note if you enter information here it will not currently bill and you will need to add the billing information manually.
Burow's Advancement Flap Text: The defect edges were debeveled with a #15 scalpel blade.  Given the location of the defect and the proximity to free margins a Burow's advancement flap was deemed most appropriate.  Using a sterile surgical marker, the appropriate advancement flap was drawn incorporating the defect and placing the expected incisions within the relaxed skin tension lines where possible.    The area thus outlined was incised deep to adipose tissue with a #15 scalpel blade.  The skin margins were undermined to an appropriate distance in all directions utilizing iris scissors.
Primary Defect Width In Cm (Final Defect Size - Required For Flaps/Grafts): 2.6
Skin Substitute Text: The defect edges were debeveled with a #15 scalpel blade.  Given the location of the defect, shape of the defect and the proximity to free margins a skin substitute graft was deemed most appropriate.  The graft material was trimmed to fit the size of the defect. The graft was then placed in the primary defect and oriented appropriately.
Consent (Marginal Mandibular)/Introductory Paragraph: The rationale for Mohs was explained to the patient and consent was obtained. The risks, benefits and alternatives to therapy were discussed in detail. Specifically, the risks of damage to the marginal mandibular branch of the facial nerve, infection, scarring, bleeding, prolonged wound healing, incomplete removal, allergy to anesthesia, and recurrence were addressed. Prior to the procedure, the treatment site was clearly identified and confirmed by the patient. All components of Universal Protocol/PAUSE Rule completed.
Subsequent Stages Histo Method Verbiage: Using a similar technique to that described above, a thin layer of tissue was removed from all areas where tumor was visible on the previous stage.  The tissue was again oriented, mapped, dyed, and processed as above.
Complex Repair And Graft Additional Text (Will Appearing After The Standard Complex Repair Text): The complex repair was not sufficient to completely close the primary defect. The remaining additional defect was repaired with the graft mentioned below.
Graft Cartilage Fenestration Text: The cartilage was fenestrated with a 2mm punch biopsy to help facilitate graft survival and healing.
Ear Wedge Repair Text: A wedge excision was completed by carrying down an excision through the full thickness of the ear and cartilage with an inward facing Burow's triangle. The wound was then closed in a layered fashion.
Plastic Surgeon Procedure Text (D): After obtaining clear surgical margins the patient was sent to plastics for surgical repair.  The patient understands they will receive post-surgical care and follow-up from the referring physician's office.
Dermal Autograft Text: The defect edges were debeveled with a #15 scalpel blade.  Given the location of the defect, shape of the defect and the proximity to free margins a dermal autograft was deemed most appropriate.  Using a sterile surgical marker, the primary defect shape was transferred to the donor site. The area thus outlined was incised deep to adipose tissue with a #15 scalpel blade.  The harvested graft was then trimmed of adipose and epidermal tissue until only dermis was left.  The skin graft was then placed in the primary defect and oriented appropriately.
Dorsal Nasal Flap Text: The defect edges were debeveled with a #15 scalpel blade.  Given the location of the defect and the proximity to free margins a dorsal nasal flap was deemed most appropriate.  Using a sterile surgical marker, an appropriate dorsal nasal flap was drawn around the defect.    The area thus outlined was incised deep to adipose tissue with a #15 scalpel blade.  The skin margins were undermined to an appropriate distance in all directions utilizing iris scissors.
Referred To Plastics For Closure Text (Leave Blank If You Do Not Want): After obtaining clear surgical margins the patient was sent to plastics for surgical repair.  The patient understands they will receive post-surgical care and follow-up from the repairing physician's office.
Area L Indication Text: Tumors in this location are included in Area L (trunk and extremities).  Mohs surgery is indicated for larger tumors, or tumors with aggressive histologic features, in these anatomic locations.
Anesthesia Volume In Cc: 9
Consent (Lip)/Introductory Paragraph: The rationale for Mohs was explained to the patient and consent was obtained. The risks, benefits and alternatives to therapy were discussed in detail. Specifically, the risks of lip deformity, changes in the oral aperture, infection, scarring, bleeding, prolonged wound healing, incomplete removal, allergy to anesthesia, nerve injury and recurrence were addressed. Prior to the procedure, the treatment site was clearly identified and confirmed by the patient. All components of Universal Protocol/PAUSE Rule completed.
Hatchet Flap Text: The defect edges were debeveled with a #15 scalpel blade.  Given the location of the defect, shape of the defect and the proximity to free margins a hatchet flap was deemed most appropriate.  Using a sterile surgical marker, an appropriate hatchet flap was drawn incorporating the defect and placing the expected incisions within the relaxed skin tension lines where possible.    The area thus outlined was incised deep to adipose tissue with a #15 scalpel blade.  The skin margins were undermined to an appropriate distance in all directions utilizing iris scissors.
Melolabial Interpolation Flap Text: A decision was made to reconstruct the defect utilizing an interpolation axial flap and a staged reconstruction.  A telfa template was made of the defect.  This telfa template was then used to outline the melolabial interpolation flap.  The donor area for the pedicle flap was then injected with anesthesia.  The flap was excised through the skin and subcutaneous tissue down to the layer of the underlying musculature.  The pedicle flap was carefully excised within this deep plane to maintain its blood supply.  The edges of the donor site were undermined.   The donor site was closed in a primary fashion.  The pedicle was then rotated into position and sutured.  Once the tube was sutured into place, adequate blood supply was confirmed with blanching and refill.  The pedicle was then wrapped with xeroform gauze and dressed appropriately with a telfa and gauze bandage to ensure continued blood supply and protect the attached pedicle.
Wound Care: Vaseline
Wound Care (No Sutures): Petrolatum
Surgeon/Pathologist Verbiage (Will Incorporate Name Of Surgeon From Intro If Not Blank): operated in two distinct and integrated capacities as the surgeon and pathologist.
Purse String (Simple) Text: Given the location of the defect and the characteristics of the surrounding skin a pursestring closure was deemed most appropriate.  Undermining was performed circumfirentially around the surgical defect.  A purstring suture was then placed and tightened.
Area M Indication Text: Tumors in this location are included in Area M (cheek, forehead, scalp, neck, jawline and pretibial skin).  Mohs surgery is indicated for tumors in these anatomic locations.
Melolabial Transposition Flap Text: The defect edges were debeveled with a #15 scalpel blade.  Given the location of the defect and the proximity to free margins a melolabial flap was deemed most appropriate.  Using a sterile surgical marker, an appropriate melolabial transposition flap was drawn incorporating the defect.    The area thus outlined was incised deep to adipose tissue with a #15 scalpel blade.  The skin margins were undermined to an appropriate distance in all directions utilizing iris scissors.
S Plasty Text: Given the location and shape of the defect, and the orientation of relaxed skin tension lines, an S-plasty was deemed most appropriate for repair.  Using a sterile surgical marker, the appropriate outline of the S-plasty was drawn, incorporating the defect and placing the expected incisions within the relaxed skin tension lines where possible.  The area thus outlined was incised deep to adipose tissue with a #15 scalpel blade.  The skin margins were undermined to an appropriate distance in all directions utilizing iris scissors. The skin flaps were advanced over the defect.  The opposing margins were then approximated with interrupted buried subcutaneous sutures.
Cartilage Graft Text: The defect edges were debeveled with a #15 scalpel blade.  Given the location of the defect, shape of the defect, the fact the defect involved a full thickness cartilage defect a cartilage graft was deemed most appropriate.  An appropriate donor site was identified, cleansed, and anesthetized. The cartilage graft was then harvested and transferred to the recipient site, oriented appropriately and then sutured into place.  The secondary defect was then repaired using a primary closure.
Postop Diagnosis: same
Medical Necessity Statement: Based on my medical judgement, Mohs surgery is the most appropriate treatment for this cancer compared to other treatments.
Posterior Auricular Interpolation Flap Text: A decision was made to reconstruct the defect utilizing an interpolation axial flap and a staged reconstruction.  A telfa template was made of the defect.  This telfa template was then used to outline the posterior auricular interpolation flap.  The donor area for the pedicle flap was then injected with anesthesia.  The flap was excised through the skin and subcutaneous tissue down to the layer of the underlying musculature.  The pedicle flap was carefully excised within this deep plane to maintain its blood supply.  The edges of the donor site were undermined.   The donor site was closed in a primary fashion.  The pedicle was then rotated into position and sutured.  Once the tube was sutured into place, adequate blood supply was confirmed with blanching and refill.  The pedicle was then wrapped with xeroform gauze and dressed appropriately with a telfa and gauze bandage to ensure continued blood supply and protect the attached pedicle.
Helical Rim Advancement Flap Text: The defect edges were debeveled with a #15 blade scalpel.  Given the location of the defect and the proximity to free margins (helical rim) a double helical rim advancement flap was deemed most appropriate.  Using a sterile surgical marker, the appropriate advancement flaps were drawn incorporating the defect and placing the expected incisions between the helical rim and antihelix where possible.  The area thus outlined was incised through and through with a #15 scalpel blade.  With a skin hook and iris scissors, the flaps were gently and sharply undermined and freed up.
Estimated Blood Loss (Cc): minimal
Crescentic Advancement Flap Text: The defect edges were debeveled with a #15 scalpel blade.  Given the location of the defect and the proximity to free margins a crescentic advancement flap was deemed most appropriate.  Using a sterile surgical marker, the appropriate advancement flap was drawn incorporating the defect and placing the expected incisions within the relaxed skin tension lines where possible.    The area thus outlined was incised deep to adipose tissue with a #15 scalpel blade.  The skin margins were undermined to an appropriate distance in all directions utilizing iris scissors.
Epidermal Autograft Text: The defect edges were debeveled with a #15 scalpel blade.  Given the location of the defect, shape of the defect and the proximity to free margins an epidermal autograft was deemed most appropriate.  Using a sterile surgical marker, the primary defect shape was transferred to the donor site. The epidermal graft was then harvested.  The skin graft was then placed in the primary defect and oriented appropriately.
Muscle Hinge Flap Text: The defect edges were debeveled with a #15 scalpel blade.  Given the size, depth and location of the defect and the proximity to free margins a muscle hinge flap was deemed most appropriate.  Using a sterile surgical marker, an appropriate hinge flap was drawn incorporating the defect. The area thus outlined was incised with a #15 scalpel blade.  The skin margins were undermined to an appropriate distance in all directions utilizing iris scissors.
Bilateral Helical Rim Advancement Flap Text: The defect edges were debeveled with a #15 blade scalpel.  Given the location of the defect and the proximity to free margins (helical rim) a bilateral helical rim advancement flap was deemed most appropriate.  Using a sterile surgical marker, the appropriate advancement flaps were drawn incorporating the defect and placing the expected incisions between the helical rim and antihelix where possible.  The area thus outlined was incised through and through with a #15 scalpel blade.  With a skin hook and iris scissors, the flaps were gently and sharply undermined and freed up.
O-T Plasty Text: The defect edges were debeveled with a #15 scalpel blade.  Given the location of the defect, shape of the defect and the proximity to free margins an O-T plasty was deemed most appropriate.  Using a sterile surgical marker, an appropriate O-T plasty was drawn incorporating the defect and placing the expected incisions within the relaxed skin tension lines where possible.    The area thus outlined was incised deep to adipose tissue with a #15 scalpel blade.  The skin margins were undermined to an appropriate distance in all directions utilizing iris scissors.
Island Pedicle Flap Text: The defect edges were debeveled with a #15 scalpel blade.  Given the location of the defect, shape of the defect and the proximity to free margins an island pedicle advancement flap was deemed most appropriate.  Using a sterile surgical marker, an appropriate advancement flap was drawn incorporating the defect, outlining the appropriate donor tissue and placing the expected incisions within the relaxed skin tension lines where possible.    The area thus outlined was incised deep to adipose tissue with a #15 scalpel blade.  The skin margins were undermined to an appropriate distance in all directions around the primary defect and laterally outward around the island pedicle utilizing iris scissors.  There was minimal undermining beneath the pedicle flap.
O-L Flap Text: The defect edges were debeveled with a #15 scalpel blade.  Given the location of the defect, shape of the defect and the proximity to free margins an O-L flap was deemed most appropriate.  Using a sterile surgical marker, an appropriate advancement flap was drawn incorporating the defect and placing the expected incisions within the relaxed skin tension lines where possible.    The area thus outlined was incised deep to adipose tissue with a #15 scalpel blade.  The skin margins were undermined to an appropriate distance in all directions utilizing iris scissors.
Surgeon: Dr. Fouzia Carty
Primary Defect Length In Cm (Final Defect Size - Required For Flaps/Grafts): 3.2
No Residual Tumor Seen Histology Text: There were no malignant cells seen in the sections examined.
Consent (Spinal Accessory)/Introductory Paragraph: The rationale for Mohs was explained to the patient and consent was obtained. The risks, benefits and alternatives to therapy were discussed in detail. Specifically, the risks of damage to the spinal accessory nerve, infection, scarring, bleeding, prolonged wound healing, incomplete removal, allergy to anesthesia, and recurrence were addressed. Prior to the procedure, the treatment site was clearly identified and confirmed by the patient. All components of Universal Protocol/PAUSE Rule completed.
Full Thickness Lip Wedge Repair (Flap) Text: Given the location of the defect and the proximity to free margins a full thickness wedge repair was deemed most appropriate.  Using a sterile surgical marker, the appropriate repair was drawn incorporating the defect and placing the expected incisions perpendicular to the vermillion border.  The vermillion border was also meticulously outlined to ensure appropriate reapproximation during the repair.  The area thus outlined was incised through and through with a #15 scalpel blade.  The muscularis and dermis were reaproximated with deep sutures following hemostasis. Care was taken to realign the vermillion border before proceeding with the superficial closure.  Once the vermillion was realigned the superfical and mucosal closure was finished.
Consent 2/Introductory Paragraph: Mohs surgery was explained to the patient and consent was obtained. The risks, benefits and alternatives to therapy were discussed in detail. Specifically, the risks of infection, scarring, bleeding, prolonged wound healing, incomplete removal, allergy to anesthesia, nerve injury and recurrence were addressed. Prior to the procedure, the treatment site was clearly identified and confirmed by the patient. All components of Universal Protocol/PAUSE Rule completed.
Cheek Interpolation Flap Text: A decision was made to reconstruct the defect utilizing an interpolation axial flap and a staged reconstruction.  A telfa template was made of the defect.  This telfa template was then used to outline the Cheek Interpolation flap.  The donor area for the pedicle flap was then injected with anesthesia.  The flap was excised through the skin and subcutaneous tissue down to the layer of the underlying musculature.  The interpolation flap was carefully excised within this deep plane to maintain its blood supply.  The edges of the donor site were undermined.   The donor site was closed in a primary fashion.  The pedicle was then rotated into position and sutured.  Once the tube was sutured into place, adequate blood supply was confirmed with blanching and refill.  The pedicle was then wrapped with xeroform gauze and dressed appropriately with a telfa and gauze bandage to ensure continued blood supply and protect the attached pedicle.
Rotation Flap Text: The defect edges were debeveled with a #15 scalpel blade.  Given the location of the defect, shape of the defect and the proximity to free margins a rotation flap was deemed most appropriate.  Using a sterile surgical marker, an appropriate rotation flap was drawn incorporating the defect and placing the expected incisions within the relaxed skin tension lines where possible.    The area thus outlined was incised deep to adipose tissue with a #15 scalpel blade.  The skin margins were undermined to an appropriate distance in all directions utilizing iris scissors.
Transposition Flap Text: The defect edges were debeveled with a #15 scalpel blade.  Given the location of the defect and the proximity to free margins a transposition flap was deemed most appropriate.  Using a sterile surgical marker, an appropriate transposition flap was drawn incorporating the defect.    The area thus outlined was incised deep to adipose tissue with a #15 scalpel blade.  The skin margins were undermined to an appropriate distance in all directions utilizing iris scissors.
Secondary Intention Text (Leave Blank If You Do Not Want): The defect will heal with secondary intention.
Complex Repair And Flap Additional Text (Will Appearing After The Standard Complex Repair Text): The complex repair was not sufficient to completely close the primary defect. The remaining additional defect was repaired with the flap mentioned below.
Consent (Nose)/Introductory Paragraph: The rationale for Mohs was explained to the patient and consent was obtained. The risks, benefits and alternatives to therapy were discussed in detail. Specifically, the risks of nasal deformity, changes in the flow of air through the nose, infection, scarring, bleeding, prolonged wound healing, incomplete removal, allergy to anesthesia, nerve injury and recurrence were addressed. Prior to the procedure, the treatment site was clearly identified and confirmed by the patient. All components of Universal Protocol/PAUSE Rule completed.
O-Z Plasty Text: The defect edges were debeveled with a #15 scalpel blade.  Given the location of the defect, shape of the defect and the proximity to free margins an O-Z plasty (double transposition flap) was deemed most appropriate.  Using a sterile surgical marker, the appropriate transposition flaps were drawn incorporating the defect and placing the expected incisions within the relaxed skin tension lines where possible.    The area thus outlined was incised deep to adipose tissue with a #15 scalpel blade.  The skin margins were undermined to an appropriate distance in all directions utilizing iris scissors.  Hemostasis was achieved with electrocautery.  The flaps were then transposed into place, one clockwise and the other counterclockwise, and anchored with interrupted buried subcutaneous sutures.
Closure 4 Information: This tab is for additional flaps and grafts above and beyond our usual structured repairs.  Please note if you enter information here it will not currently bill and you will need to add the billing information manually.
Mauc Instructions: By selecting yes to the question below the MAUC number will be added into the note.  This will be calculated automatically based on the diagnosis chosen, the size entered, the body zone selected (H,M,L) and the specific indications you chose. You will also have the option to override the Mohs AUC if you disagree with the automatically calculated number and this option is found in the Case Summary tab.
Bilobed Flap Text: The defect edges were debeveled with a #15 scalpel blade.  Given the location of the defect and the proximity to free margins a bilobe flap was deemed most appropriate.  Using a sterile surgical marker, an appropriate bilobe flap drawn around the defect.    The area thus outlined was incised deep to adipose tissue with a #15 scalpel blade.  The skin margins were undermined to an appropriate distance in all directions utilizing iris scissors.
Composite Graft Text: The defect edges were debeveled with a #15 scalpel blade.  Given the location of the defect, shape of the defect, the proximity to free margins and the fact the defect was full thickness a composite graft was deemed most appropriate.  The defect was outline and then transferred to the donor site.  A full thickness graft was then excised from the donor site. The graft was then placed in the primary defect, oriented appropriately and then sutured into place.  The secondary defect was then repaired using a primary closure.
Alar Island Pedicle Flap Text: The defect edges were debeveled with a #15 scalpel blade.  Given the location of the defect, shape of the defect and the proximity to the alar rim an island pedicle advancement flap was deemed most appropriate.  Using a sterile surgical marker, an appropriate advancement flap was drawn incorporating the defect, outlining the appropriate donor tissue and placing the expected incisions within the nasal ala running parallel to the alar rim. The area thus outlined was incised with a #15 scalpel blade.  The skin margins were undermined minimally to an appropriate distance in all directions around the primary defect and laterally outward around the island pedicle utilizing iris scissors.  There was minimal undermining beneath the pedicle flap.
Bcc Histology Text: There were numerous aggregates of basaloid cells.
Purse String (Intermediate) Text: Given the location of the defect and the characteristics of the surrounding skin a pursestring intermediate closure was deemed most appropriate.  Undermining was performed circumfirentially around the surgical defect.  A purstring suture was then placed and tightened.
Mohs Histo Method Verbiage: Each section was then chromacoded and processed in the Mohs lab using the Mohs protocol and submitted for frozen section.
Area H Indication Text: Tumors in this location are included in Area H (eyelids, eyebrows, nose, lips, chin, ear, pre-auricular, post-auricular, temple, genitalia, hands, feet, ankles and areola).  Tissue conservation is critical in these anatomic locations.
Bcc Infiltrative Histology Text: There were numerous aggregates of basaloid cells demonstrating an infiltrative pattern.
H Plasty Text: Given the location of the defect, shape of the defect and the proximity to free margins a H-plasty was deemed most appropriate for repair.  Using a sterile surgical marker, the appropriate advancement arms of the H-plasty were drawn incorporating the defect and placing the expected incisions within the relaxed skin tension lines where possible. The area thus outlined was incised deep to adipose tissue with a #15 scalpel blade. The skin margins were undermined to an appropriate distance in all directions utilizing iris scissors.  The opposing advancement arms were then advanced into place in opposite direction and anchored with interrupted buried subcutaneous sutures.
Anesthesia Type: 1% lidocaine with epinephrine and a 1:10 solution of 8.4% sodium bicarbonate
Spiral Flap Text: The defect edges were debeveled with a #15 scalpel blade.  Given the location of the defect, shape of the defect and the proximity to free margins a spiral flap was deemed most appropriate.  Using a sterile surgical marker, an appropriate rotation flap was drawn incorporating the defect and placing the expected incisions within the relaxed skin tension lines where possible. The area thus outlined was incised deep to adipose tissue with a #15 scalpel blade.  The skin margins were undermined to an appropriate distance in all directions utilizing iris scissors.
Detail Level: Detailed
Xenograft Text: The defect edges were debeveled with a #15 scalpel blade.  Given the location of the defect, shape of the defect and the proximity to free margins a xenograft was deemed most appropriate.  The graft was then trimmed to fit the size of the defect.  The graft was then placed in the primary defect and oriented appropriately.
Consent Type: Consent 1 (Standard)
Complex Repair Preamble Text (Leave Blank If You Do Not Want): The defect edges were debeveled with a #15 scalpel blade. Given the location of the defect a complex repair was deemed most appropriate.  Using a sterile surgical marker, burrow triangles were drawn incorporating the defect and placing the expected incisions within the relaxed skin tension lines where possible.    The area thus outlined was incised to the appropriate tissue plane with a scalpel blade. Extensive wide undermining was performed in all directions to allow proper closure of the defect.  Hemostasis was obtained by cautery.
Depth Of Tumor Invasion (For Histology): tumor not visualized (deep and peripheral margins are clear of tumor)
Ftsg Text: The defect edges were debeveled with a #15 scalpel blade.  Given the location of the defect, shape of the defect and the proximity to free margins a full thickness skin graft was deemed most appropriate.  Using a sterile surgical marker, the primary defect shape was transferred to the donor site. The area thus outlined was incised deep to adipose tissue with a #15 scalpel blade.  The harvested graft was then trimmed of adipose tissue until only dermis and epidermis was left.  The skin margins of the secondary defect were undermined to an appropriate distance in all directions utilizing iris scissors.  The secondary defect was closed with interrupted buried subcutaneous sutures.  The skin edges were then re-apposed with running  sutures.  The skin graft was then placed in the primary defect and oriented appropriately.
Location Indication Override (Is Already Calculated Based On Selected Body Location): Area M
Dressing: pressure dressing with telfa
A-T Advancement Flap Text: The defect edges were debeveled with a #15 scalpel blade.  Given the location of the defect, shape of the defect and the proximity to free margins an A-T advancement flap was deemed most appropriate.  Using a sterile surgical marker, an appropriate advancement flap was drawn incorporating the defect and placing the expected incisions within the relaxed skin tension lines where possible.    The area thus outlined was incised deep to adipose tissue with a #15 scalpel blade.  The skin margins were undermined to an appropriate distance in all directions utilizing iris scissors.
Mastoid Interpolation Flap Text: A decision was made to reconstruct the defect utilizing an interpolation axial flap and a staged reconstruction.  A telfa template was made of the defect.  This telfa template was then used to outline the mastoid interpolation flap.  The donor area for the pedicle flap was then injected with anesthesia.  The flap was excised through the skin and subcutaneous tissue down to the layer of the underlying musculature.  The pedicle flap was carefully excised within this deep plane to maintain its blood supply.  The edges of the donor site were undermined.   The donor site was closed in a primary fashion.  The pedicle was then rotated into position and sutured.  Once the tube was sutured into place, adequate blood supply was confirmed with blanching and refill.  The pedicle was then wrapped with xeroform gauze and dressed appropriately with a telfa and gauze bandage to ensure continued blood supply and protect the attached pedicle.
Cheiloplasty (Complex) Text: A decision was made to reconstruct the defect with a  cheiloplasty.  The defect was undermined extensively.  Additional obicularis oris muscle was excised with a 15 blade scalpel.  The defect was converted into a full thickness wedge to facilite a better cosmetic result.  Small vessels were then tied off with 5-0 monocyrl. The obicularis oris, superficial fascia, adipose and dermis were then reapproximated.  After the deeper layers were approximated the epidermis was reapproximated with particular care given to realign the vermillion border.
Mercedes Flap Text: The defect edges were debeveled with a #15 scalpel blade.  Given the location of the defect, shape of the defect and the proximity to free margins a Mercedes flap was deemed most appropriate.  Using a sterile surgical marker, an appropriate advancement flap was drawn incorporating the defect and placing the expected incisions within the relaxed skin tension lines where possible. The area thus outlined was incised deep to adipose tissue with a #15 scalpel blade.  The skin margins were undermined to an appropriate distance in all directions utilizing iris scissors.
Partial Purse String (Simple) Text: Given the location of the defect and the characteristics of the surrounding skin a simple purse string closure was deemed most appropriate.  Undermining was performed circumfirentially around the surgical defect.  A purse string suture was then placed and tightened. Wound tension only allowed a partial closure of the circular defect.
Mohs Method Verbiage: An incision at a 45 degree angle following the standard Mohs approach was done and the specimen was harvested as a microscopic controlled layer.
O-T Advancement Flap Text: The defect edges were debeveled with a #15 scalpel blade.  Given the location of the defect, shape of the defect and the proximity to free margins an O-T advancement flap was deemed most appropriate.  Using a sterile surgical marker, an appropriate advancement flap was drawn incorporating the defect and placing the expected incisions within the relaxed skin tension lines where possible.    The area thus outlined was incised deep to adipose tissue with a #15 scalpel blade.  The skin margins were undermined to an appropriate distance in all directions utilizing iris scissors.
Cheek-To-Nose Interpolation Flap Text: A decision was made to reconstruct the defect utilizing an interpolation axial flap and a staged reconstruction.  A telfa template was made of the defect.  This telfa template was then used to outline the Cheek-To-Nose Interpolation flap.  The donor area for the pedicle flap was then injected with anesthesia.  The flap was excised through the skin and subcutaneous tissue down to the layer of the underlying musculature.  The interpolation flap was carefully excised within this deep plane to maintain its blood supply.  The edges of the donor site were undermined.   The donor site was closed in a primary fashion.  The pedicle was then rotated into position and sutured.  Once the tube was sutured into place, adequate blood supply was confirmed with blanching and refill.  The pedicle was then wrapped with xeroform gauze and dressed appropriately with a telfa and gauze bandage to ensure continued blood supply and protect the attached pedicle.
Consent 1/Introductory Paragraph: The rationale for Mohs was explained to the patient and consent was obtained. The risks, benefits and alternatives to therapy were discussed in detail. Specifically, the risks of infection, scarring, bleeding, prolonged wound healing, incomplete removal, allergy to anesthesia, nerve injury and recurrence were addressed. Prior to the procedure, the treatment site was clearly identified and confirmed by the patient. All components of Universal Protocol/PAUSE Rule completed.
Consent (Near Eyelid Margin)/Introductory Paragraph: The rationale for Mohs was explained to the patient and consent was obtained. The risks, benefits and alternatives to therapy were discussed in detail. Specifically, the risks of ectropion or eyelid deformity, infection, scarring, bleeding, prolonged wound healing, incomplete removal, allergy to anesthesia, nerve injury and recurrence were addressed. Prior to the procedure, the treatment site was clearly identified and confirmed by the patient. All components of Universal Protocol/PAUSE Rule completed.
Modified Advancement Flap Text: The defect edges were debeveled with a #15 scalpel blade.  Given the location of the defect, shape of the defect and the proximity to free margins a modified advancement flap was deemed most appropriate.  Using a sterile surgical marker, an appropriate advancement flap was drawn incorporating the defect and placing the expected incisions within the relaxed skin tension lines where possible.    The area thus outlined was incised deep to adipose tissue with a #15 scalpel blade.  The skin margins were undermined to an appropriate distance in all directions utilizing iris scissors.
Bilobed Transposition Flap Text: The defect edges were debeveled with a #15 scalpel blade.  Given the location of the defect and the proximity to free margins a bilobed transposition flap was deemed most appropriate.  Using a sterile surgical marker, an appropriate bilobe flap drawn around the defect.    The area thus outlined was incised deep to adipose tissue with a #15 scalpel blade.  The skin margins were undermined to an appropriate distance in all directions utilizing iris scissors.
Number Of Stages: 1
Double Island Pedicle Flap Text: The defect edges were debeveled with a #15 scalpel blade.  Given the location of the defect, shape of the defect and the proximity to free margins a double island pedicle advancement flap was deemed most appropriate.  Using a sterile surgical marker, an appropriate advancement flap was drawn incorporating the defect, outlining the appropriate donor tissue and placing the expected incisions within the relaxed skin tension lines where possible.    The area thus outlined was incised deep to adipose tissue with a #15 scalpel blade.  The skin margins were undermined to an appropriate distance in all directions around the primary defect and laterally outward around the island pedicle utilizing iris scissors.  There was minimal undermining beneath the pedicle flap.
Bi-Rhombic Flap Text: The defect edges were debeveled with a #15 scalpel blade.  Given the location of the defect and the proximity to free margins a bi-rhombic flap was deemed most appropriate.  Using a sterile surgical marker, an appropriate rhombic flap was drawn incorporating the defect. The area thus outlined was incised deep to adipose tissue with a #15 scalpel blade.  The skin margins were undermined to an appropriate distance in all directions utilizing iris scissors.
No Repair - Repaired With Adjacent Surgical Defect Text (Leave Blank If You Do Not Want): After obtaining clear surgical margins the defect was repaired concurrently with another surgical defect which was in close approximation.
W Plasty Text: The lesion was extirpated to the level of the fat with a #15 scalpel blade.  Given the location of the defect, shape of the defect and the proximity to free margins a W-plasty was deemed most appropriate for repair.  Using a sterile surgical marker, the appropriate transposition arms of the W-plasty were drawn incorporating the defect and placing the expected incisions within the relaxed skin tension lines where possible.    The area thus outlined was incised deep to adipose tissue with a #15 scalpel blade.  The skin margins were undermined to an appropriate distance in all directions utilizing iris scissors.  The opposing transposition arms were then transposed into place in opposite direction and anchored with interrupted buried subcutaneous sutures.
Advancement Flap (Single) Text: The defect edges were debeveled with a #15 scalpel blade.  Given the location of the defect and the proximity to free margins a single advancement flap was deemed most appropriate.  Using a sterile surgical marker, an appropriate advancement flap was drawn incorporating the defect and placing the expected incisions within the relaxed skin tension lines where possible.    The area thus outlined was incised deep to adipose tissue with a #15 scalpel blade.  The skin margins were undermined to an appropriate distance in all directions utilizing iris scissors.
Dressing (No Sutures): dry sterile dressing
Tarsorrhaphy Text: A tarsorrhaphy was performed using Frost sutures.

## 2018-11-09 ENCOUNTER — APPOINTMENT (RX ONLY)
Dept: URBAN - METROPOLITAN AREA CLINIC 141 | Facility: CLINIC | Age: 73
Setting detail: DERMATOLOGY
End: 2018-11-09

## 2018-11-09 DIAGNOSIS — Z48.02 ENCOUNTER FOR REMOVAL OF SUTURES: ICD-10-CM

## 2018-11-09 PROCEDURE — ? SUTURE REMOVAL (GLOBAL PERIOD)

## 2018-11-09 ASSESSMENT — LOCATION ZONE DERM: LOCATION ZONE: FACE

## 2018-11-09 ASSESSMENT — LOCATION SIMPLE DESCRIPTION DERM: LOCATION SIMPLE: LEFT CHEEK

## 2018-11-09 ASSESSMENT — LOCATION DETAILED DESCRIPTION DERM: LOCATION DETAILED: LEFT CENTRAL MANDIBULAR CHEEK

## 2018-11-09 NOTE — PROCEDURE: SUTURE REMOVAL (GLOBAL PERIOD)
Detail Level: Detailed
Add 05840 Cpt? (Important Note: In 2017 The Use Of 49726 Is Being Tracked By Cms To Determine Future Global Period Reimbursement For Global Periods): yes
Body Location Override (Optional - Billing Will Still Be Based On Selected Body Map Location If Applicable): left jawline

## 2019-04-27 ENCOUNTER — HOSPITAL ENCOUNTER (EMERGENCY)
Dept: HOSPITAL 35 - ER | Age: 74
Discharge: HOME | End: 2019-04-27
Payer: COMMERCIAL

## 2019-04-27 VITALS — SYSTOLIC BLOOD PRESSURE: 132 MMHG | DIASTOLIC BLOOD PRESSURE: 89 MMHG

## 2019-04-27 VITALS — HEIGHT: 64 IN | WEIGHT: 220 LBS | BODY MASS INDEX: 37.56 KG/M2

## 2019-04-27 DIAGNOSIS — H44.9: ICD-10-CM

## 2019-04-27 DIAGNOSIS — E11.9: ICD-10-CM

## 2019-04-27 DIAGNOSIS — E78.00: ICD-10-CM

## 2019-04-27 DIAGNOSIS — Z90.710: ICD-10-CM

## 2019-04-27 DIAGNOSIS — M54.5: ICD-10-CM

## 2019-04-27 DIAGNOSIS — Z86.14: ICD-10-CM

## 2019-04-27 DIAGNOSIS — Z87.891: ICD-10-CM

## 2019-04-27 DIAGNOSIS — Z87.01: ICD-10-CM

## 2019-04-27 DIAGNOSIS — G89.29: Primary | ICD-10-CM

## 2019-04-27 DIAGNOSIS — I48.0: ICD-10-CM

## 2019-04-27 DIAGNOSIS — M79.7: ICD-10-CM

## 2019-04-27 DIAGNOSIS — F41.9: ICD-10-CM

## 2019-04-27 DIAGNOSIS — I10: ICD-10-CM

## 2019-05-19 ENCOUNTER — HOSPITAL ENCOUNTER (EMERGENCY)
Dept: HOSPITAL 35 - ER | Age: 74
Discharge: HOME | End: 2019-05-19
Payer: COMMERCIAL

## 2019-05-19 VITALS — BODY MASS INDEX: 35.44 KG/M2 | WEIGHT: 200 LBS | HEIGHT: 63 IN

## 2019-05-19 VITALS — SYSTOLIC BLOOD PRESSURE: 174 MMHG | DIASTOLIC BLOOD PRESSURE: 75 MMHG

## 2019-05-19 DIAGNOSIS — J44.9: ICD-10-CM

## 2019-05-19 DIAGNOSIS — I27.20: ICD-10-CM

## 2019-05-19 DIAGNOSIS — Z79.899: ICD-10-CM
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
## 2019-05-19 DIAGNOSIS — Z90.710: ICD-10-CM

## 2019-05-19 DIAGNOSIS — Z87.891: ICD-10-CM

## 2019-05-19 DIAGNOSIS — Z85.828: ICD-10-CM

## 2019-05-19 DIAGNOSIS — E78.00: ICD-10-CM
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
## 2019-05-19 DIAGNOSIS — I48.91: ICD-10-CM

## 2019-05-19 DIAGNOSIS — Z98.890: ICD-10-CM

## 2019-05-19 DIAGNOSIS — S00.03XA: Primary | ICD-10-CM

## 2019-05-19 DIAGNOSIS — M79.7: ICD-10-CM
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
## 2019-05-19 DIAGNOSIS — Z87.01: ICD-10-CM
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
## 2019-05-19 DIAGNOSIS — Z86.14: ICD-10-CM

## 2019-05-19 DIAGNOSIS — E11.9: ICD-10-CM

## 2019-05-24 ENCOUNTER — HOSPITAL ENCOUNTER (EMERGENCY)
Dept: HOSPITAL 35 - ER | Age: 74
Discharge: HOME | End: 2019-05-24
Payer: COMMERCIAL

## 2019-05-24 VITALS — DIASTOLIC BLOOD PRESSURE: 80 MMHG | SYSTOLIC BLOOD PRESSURE: 172 MMHG

## 2019-05-24 VITALS — HEIGHT: 66 IN | BODY MASS INDEX: 36.16 KG/M2 | WEIGHT: 225 LBS

## 2019-05-24 DIAGNOSIS — M79.7: ICD-10-CM

## 2019-05-24 DIAGNOSIS — E11.9: ICD-10-CM

## 2019-05-24 DIAGNOSIS — E78.00: ICD-10-CM
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
## 2019-05-24 DIAGNOSIS — Z90.710: ICD-10-CM

## 2019-05-24 DIAGNOSIS — I10: ICD-10-CM

## 2019-05-24 DIAGNOSIS — Z87.891: ICD-10-CM

## 2019-05-24 DIAGNOSIS — J44.9: ICD-10-CM

## 2019-05-24 DIAGNOSIS — Z85.828: ICD-10-CM
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
## 2019-05-24 DIAGNOSIS — Z98.890: ICD-10-CM

## 2019-05-24 DIAGNOSIS — S00.03XA: Primary | ICD-10-CM

## 2019-05-24 DIAGNOSIS — I48.0: ICD-10-CM

## 2019-06-01 ENCOUNTER — HOSPITAL ENCOUNTER (INPATIENT)
Dept: HOSPITAL 35 - ER | Age: 74
LOS: 1 days | Discharge: HOME | DRG: 682 | End: 2019-06-02
Attending: INTERNAL MEDICINE | Admitting: INTERNAL MEDICINE
Payer: COMMERCIAL

## 2019-06-01 VITALS — BODY MASS INDEX: 38.98 KG/M2 | WEIGHT: 220 LBS | HEIGHT: 62.99 IN

## 2019-06-01 VITALS — DIASTOLIC BLOOD PRESSURE: 60 MMHG | SYSTOLIC BLOOD PRESSURE: 134 MMHG

## 2019-06-01 DIAGNOSIS — J96.10: ICD-10-CM

## 2019-06-01 DIAGNOSIS — J44.9: ICD-10-CM

## 2019-06-01 DIAGNOSIS — G89.4: ICD-10-CM

## 2019-06-01 DIAGNOSIS — I27.20: ICD-10-CM

## 2019-06-01 DIAGNOSIS — N18.9: ICD-10-CM

## 2019-06-01 DIAGNOSIS — G45.9: ICD-10-CM

## 2019-06-01 DIAGNOSIS — I12.9: ICD-10-CM

## 2019-06-01 DIAGNOSIS — I48.2: ICD-10-CM

## 2019-06-01 DIAGNOSIS — F41.9: ICD-10-CM

## 2019-06-01 DIAGNOSIS — F11.20: ICD-10-CM

## 2019-06-01 DIAGNOSIS — F32.9: ICD-10-CM

## 2019-06-01 DIAGNOSIS — E78.00: ICD-10-CM

## 2019-06-01 DIAGNOSIS — M54.9: ICD-10-CM

## 2019-06-01 DIAGNOSIS — G93.41: ICD-10-CM

## 2019-06-01 DIAGNOSIS — Z87.891: ICD-10-CM

## 2019-06-01 DIAGNOSIS — Z90.710: ICD-10-CM

## 2019-06-01 DIAGNOSIS — N17.9: Primary | ICD-10-CM

## 2019-06-01 DIAGNOSIS — Z79.899: ICD-10-CM

## 2019-06-01 LAB
ALBUMIN SERPL-MCNC: 3.3 G/DL (ref 3.4–5)
ALT SERPL-CCNC: 18 U/L (ref 30–65)
ANION GAP SERPL CALC-SCNC: 7 MMOL/L (ref 7–16)
ANISOCYTOSIS BLD QL SMEAR: (no result)
APTT BLD: 29.7 SECONDS (ref 24.5–32.8)
AST SERPL-CCNC: 13 U/L (ref 15–37)
BASOPHILS NFR BLD AUTO: 0 % (ref 0–2)
BILIRUB SERPL-MCNC: 0.2 MG/DL
BUN SERPL-MCNC: 28 MG/DL (ref 7–18)
CALCIUM SERPL-MCNC: 9.5 MG/DL (ref 8.5–10.1)
CHLORIDE SERPL-SCNC: 101 MMOL/L (ref 98–107)
CO2 SERPL-SCNC: 34 MMOL/L (ref 21–32)
CREAT SERPL-MCNC: 1.5 MG/DL (ref 0.6–1)
EOSINOPHIL NFR BLD: 3 % (ref 0–3)
ERYTHROCYTE [DISTWIDTH] IN BLOOD BY AUTOMATED COUNT: 16.3 % (ref 10.5–14.5)
GLUCOSE SERPL-MCNC: 146 MG/DL (ref 74–106)
GRANULOCYTES NFR BLD MANUAL: 73 % (ref 36–66)
HCT VFR BLD CALC: 38.7 % (ref 37–47)
HGB BLD-MCNC: 12.7 GM/DL (ref 12–15)
INR PPP: 1
LYMPHOCYTES NFR BLD AUTO: 16 % (ref 24–44)
MAGNESIUM SERPL-MCNC: 2.1 MG/DL (ref 1.8–2.4)
MCH RBC QN AUTO: 28.2 PG (ref 26–34)
MCHC RBC AUTO-ENTMCNC: 32.8 G/DL (ref 28–37)
MCV RBC: 86.1 FL (ref 80–100)
METAMYELOCYTES NFR BLD: 1 %
MONOCYTES NFR BLD: 5 % (ref 1–8)
NEUTROPHILS # BLD: 7.9 THOU/UL (ref 1.4–8.2)
NEUTS BAND NFR BLD: 2 % (ref 0–8)
PLATELET # BLD: 210 THOU/UL (ref 150–400)
POTASSIUM SERPL-SCNC: 4.4 MMOL/L (ref 3.5–5.1)
PROT SERPL-MCNC: 7 G/DL (ref 6.4–8.2)
PROTHROMBIN TIME: 10 SECONDS (ref 9.3–11.4)
RBC # BLD AUTO: 4.49 MIL/UL (ref 4.2–5)
SODIUM SERPL-SCNC: 142 MMOL/L (ref 136–145)
TROPONIN I SERPL-MCNC: <0.06 NG/ML (ref ?–0.06)
WBC # BLD AUTO: 10.5 THOU/UL (ref 4–11)

## 2019-06-02 VITALS — DIASTOLIC BLOOD PRESSURE: 56 MMHG | SYSTOLIC BLOOD PRESSURE: 115 MMHG

## 2019-06-02 VITALS — DIASTOLIC BLOOD PRESSURE: 60 MMHG | SYSTOLIC BLOOD PRESSURE: 144 MMHG

## 2019-06-02 VITALS — DIASTOLIC BLOOD PRESSURE: 50 MMHG | SYSTOLIC BLOOD PRESSURE: 140 MMHG

## 2019-06-02 LAB
BACTERIA-REFLEX: (no result) /HPF
BENZODIAZ UR-MCNC: POSITIVE UG/L
BILIRUB UR-MCNC: NEGATIVE MG/DL
COLOR UR: YELLOW
KETONES UR STRIP-MCNC: NEGATIVE MG/DL
RBC # UR STRIP: NEGATIVE /UL
SP GR UR STRIP: 1.02 (ref 1–1.03)
SQUAMOUS: (no result) /LPF (ref 0–3)
URINE CLARITY: CLEAR
URINE GLUCOSE-RANDOM*: NEGATIVE
URINE LEUKOCYTES-REFLEX: (no result)
URINE NITRITE-REFLEX: NEGATIVE
URINE PROTEIN (DIPSTICK): NEGATIVE
URINE WBC-REFLEX: (no result) /HPF (ref 0–5)
UROBILINOGEN UR STRIP-ACNC: 0.2 E.U./DL (ref 0.2–1)

## 2019-07-12 ENCOUNTER — HOSPITAL ENCOUNTER (EMERGENCY)
Dept: HOSPITAL 35 - ER | Age: 74
Discharge: HOME | End: 2019-07-12
Payer: COMMERCIAL

## 2019-07-12 VITALS — BODY MASS INDEX: 38.98 KG/M2 | WEIGHT: 220 LBS | HEIGHT: 63 IN

## 2019-07-12 VITALS — SYSTOLIC BLOOD PRESSURE: 176 MMHG | DIASTOLIC BLOOD PRESSURE: 80 MMHG

## 2019-07-12 DIAGNOSIS — E11.9: ICD-10-CM

## 2019-07-12 DIAGNOSIS — Z87.891: ICD-10-CM

## 2019-07-12 DIAGNOSIS — Z86.73: ICD-10-CM

## 2019-07-12 DIAGNOSIS — F41.9: ICD-10-CM

## 2019-07-12 DIAGNOSIS — I10: ICD-10-CM

## 2019-07-12 DIAGNOSIS — E78.00: ICD-10-CM

## 2019-07-12 DIAGNOSIS — Z90.710: ICD-10-CM

## 2019-07-12 DIAGNOSIS — Z98.890: ICD-10-CM

## 2019-07-12 DIAGNOSIS — Z85.828: ICD-10-CM

## 2019-07-12 DIAGNOSIS — M79.7: ICD-10-CM

## 2019-07-12 DIAGNOSIS — J44.1: Primary | ICD-10-CM

## 2019-07-12 LAB
ANION GAP SERPL CALC-SCNC: 9 MMOL/L (ref 7–16)
BASOPHILS NFR BLD AUTO: 0.5 % (ref 0–2)
BUN SERPL-MCNC: 13 MG/DL (ref 7–18)
CALCIUM SERPL-MCNC: 9.8 MG/DL (ref 8.5–10.1)
CHLORIDE SERPL-SCNC: 102 MMOL/L (ref 98–107)
CO2 SERPL-SCNC: 31 MMOL/L (ref 21–32)
CREAT SERPL-MCNC: 0.8 MG/DL (ref 0.6–1)
EOSINOPHIL NFR BLD: 3.2 % (ref 0–3)
ERYTHROCYTE [DISTWIDTH] IN BLOOD BY AUTOMATED COUNT: 15.3 % (ref 10.5–14.5)
GLUCOSE SERPL-MCNC: 92 MG/DL (ref 74–106)
GRANULOCYTES NFR BLD MANUAL: 64.3 % (ref 36–66)
HCT VFR BLD CALC: 37.5 % (ref 37–47)
HGB BLD-MCNC: 12.1 GM/DL (ref 12–15)
LYMPHOCYTES NFR BLD AUTO: 23.6 % (ref 24–44)
MCH RBC QN AUTO: 28.7 PG (ref 26–34)
MCHC RBC AUTO-ENTMCNC: 32.4 G/DL (ref 28–37)
MCV RBC: 88.5 FL (ref 80–100)
MONOCYTES NFR BLD: 8.4 % (ref 1–8)
NEUTROPHILS # BLD: 4.2 THOU/UL (ref 1.4–8.2)
PLATELET # BLD: 131 THOU/UL (ref 150–400)
POTASSIUM SERPL-SCNC: 4 MMOL/L (ref 3.5–5.1)
RBC # BLD AUTO: 4.24 MIL/UL (ref 4.2–5)
SODIUM SERPL-SCNC: 142 MMOL/L (ref 136–145)
TROPONIN I SERPL-MCNC: <0.06 NG/ML (ref ?–0.06)
WBC # BLD AUTO: 6.5 THOU/UL (ref 4–11)

## 2019-10-17 ENCOUNTER — HOSPITAL ENCOUNTER (EMERGENCY)
Dept: HOSPITAL 35 - ER | Age: 74
Discharge: HOME | End: 2019-10-17
Payer: COMMERCIAL

## 2019-10-17 VITALS — SYSTOLIC BLOOD PRESSURE: 210 MMHG | DIASTOLIC BLOOD PRESSURE: 90 MMHG

## 2019-10-17 VITALS — BODY MASS INDEX: 35.44 KG/M2 | WEIGHT: 200 LBS | HEIGHT: 63 IN

## 2019-10-17 DIAGNOSIS — F41.9: ICD-10-CM

## 2019-10-17 DIAGNOSIS — M79.7: ICD-10-CM

## 2019-10-17 DIAGNOSIS — I10: ICD-10-CM

## 2019-10-17 DIAGNOSIS — J44.9: ICD-10-CM
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
## 2019-10-17 DIAGNOSIS — Z86.73: ICD-10-CM

## 2019-10-17 DIAGNOSIS — I48.0: ICD-10-CM
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
## 2019-10-17 DIAGNOSIS — Z98.890: ICD-10-CM

## 2019-10-17 DIAGNOSIS — Z86.14: ICD-10-CM
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
## 2019-10-17 DIAGNOSIS — Z87.891: ICD-10-CM

## 2019-10-17 DIAGNOSIS — E78.00: ICD-10-CM

## 2019-10-17 DIAGNOSIS — S62.615A: ICD-10-CM

## 2019-10-17 DIAGNOSIS — E11.9: ICD-10-CM

## 2019-10-17 DIAGNOSIS — S62.633A: Primary | ICD-10-CM

## 2019-10-17 DIAGNOSIS — Z90.710: ICD-10-CM

## 2019-10-17 DIAGNOSIS — S62.617A: ICD-10-CM
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
## 2019-11-04 ENCOUNTER — HOSPITAL ENCOUNTER (EMERGENCY)
Dept: HOSPITAL 35 - ER | Age: 74
LOS: 1 days | Discharge: HOME | End: 2019-11-05
Payer: COMMERCIAL

## 2019-11-04 VITALS — WEIGHT: 200 LBS | BODY MASS INDEX: 35.44 KG/M2 | HEIGHT: 63 IN
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
## 2019-11-04 DIAGNOSIS — Z79.899: ICD-10-CM

## 2019-11-04 DIAGNOSIS — M79.7: ICD-10-CM

## 2019-11-04 DIAGNOSIS — T42.4X1A: Primary | ICD-10-CM

## 2019-11-04 DIAGNOSIS — I48.0: ICD-10-CM

## 2019-11-04 DIAGNOSIS — F41.9: ICD-10-CM

## 2019-11-04 DIAGNOSIS — E11.9: ICD-10-CM

## 2019-11-04 DIAGNOSIS — Z98.890: ICD-10-CM

## 2019-11-04 DIAGNOSIS — E78.00: ICD-10-CM

## 2019-11-04 DIAGNOSIS — J44.9: ICD-10-CM

## 2019-11-04 DIAGNOSIS — Z85.828: ICD-10-CM

## 2019-11-04 DIAGNOSIS — I10: ICD-10-CM

## 2019-11-04 LAB
ALBUMIN SERPL-MCNC: 3.2 G/DL (ref 3.4–5)
ALT SERPL-CCNC: 12 U/L (ref 30–65)
ANION GAP SERPL CALC-SCNC: 7 MMOL/L (ref 7–16)
AST SERPL-CCNC: 13 U/L (ref 15–37)
BASOPHILS NFR BLD AUTO: 0.6 % (ref 0–2)
BENZODIAZ UR-MCNC: POSITIVE UG/L
BILIRUB SERPL-MCNC: 0.4 MG/DL
BILIRUB UR-MCNC: NEGATIVE MG/DL
BUN SERPL-MCNC: 18 MG/DL (ref 7–18)
CALCIUM SERPL-MCNC: 9.4 MG/DL (ref 8.5–10.1)
CHLORIDE SERPL-SCNC: 97 MMOL/L (ref 98–107)
CO2 SERPL-SCNC: 35 MMOL/L (ref 21–32)
COLOR UR: YELLOW
CREAT SERPL-MCNC: 0.9 MG/DL (ref 0.6–1)
EOSINOPHIL NFR BLD: 3.8 % (ref 0–3)
ERYTHROCYTE [DISTWIDTH] IN BLOOD BY AUTOMATED COUNT: 13.9 % (ref 10.5–14.5)
GLUCOSE SERPL-MCNC: 107 MG/DL (ref 74–106)
GRANULOCYTES NFR BLD MANUAL: 63.8 % (ref 36–66)
HCT VFR BLD CALC: 38.8 % (ref 37–47)
HGB BLD-MCNC: 12.4 GM/DL (ref 12–15)
KETONES UR STRIP-MCNC: NEGATIVE MG/DL
LYMPHOCYTES NFR BLD AUTO: 23.8 % (ref 24–44)
MCH RBC QN AUTO: 28.6 PG (ref 26–34)
MCHC RBC AUTO-ENTMCNC: 32 G/DL (ref 28–37)
MCV RBC: 89.4 FL (ref 80–100)
MONOCYTES NFR BLD: 8 % (ref 1–8)
NEUTROPHILS # BLD: 4.9 THOU/UL (ref 1.4–8.2)
OPIATES UR-MCNC: POSITIVE NG/ML
PLATELET # BLD: 174 THOU/UL (ref 150–400)
POTASSIUM SERPL-SCNC: 3.8 MMOL/L (ref 3.5–5.1)
PROT SERPL-MCNC: 6.8 G/DL (ref 6.4–8.2)
RBC # BLD AUTO: 4.34 MIL/UL (ref 4.2–5)
RBC # UR STRIP: NEGATIVE /UL
SODIUM SERPL-SCNC: 139 MMOL/L (ref 136–145)
SP GR UR STRIP: 1.01 (ref 1–1.03)
URINE CLARITY: CLEAR
URINE GLUCOSE-RANDOM*: NEGATIVE
URINE LEUKOCYTES-REFLEX: NEGATIVE
URINE NITRITE-REFLEX: NEGATIVE
URINE PROTEIN (DIPSTICK): NEGATIVE
UROBILINOGEN UR STRIP-ACNC: 0.2 E.U./DL (ref 0.2–1)
WBC # BLD AUTO: 7.7 THOU/UL (ref 4–11)

## 2019-11-05 VITALS — SYSTOLIC BLOOD PRESSURE: 156 MMHG | DIASTOLIC BLOOD PRESSURE: 76 MMHG

## 2019-11-08 ENCOUNTER — HOSPITAL ENCOUNTER (EMERGENCY)
Dept: HOSPITAL 35 - ER | Age: 74
Discharge: HOME | End: 2019-11-08
Payer: COMMERCIAL

## 2019-11-08 VITALS — HEIGHT: 63 IN | WEIGHT: 200 LBS | BODY MASS INDEX: 35.44 KG/M2

## 2019-11-08 VITALS — DIASTOLIC BLOOD PRESSURE: 75 MMHG | SYSTOLIC BLOOD PRESSURE: 138 MMHG

## 2019-11-08 DIAGNOSIS — Z87.891: ICD-10-CM

## 2019-11-08 DIAGNOSIS — I48.0: ICD-10-CM

## 2019-11-08 DIAGNOSIS — J44.9: ICD-10-CM

## 2019-11-08 DIAGNOSIS — Z90.710: ICD-10-CM

## 2019-11-08 DIAGNOSIS — I10: ICD-10-CM

## 2019-11-08 DIAGNOSIS — G89.29: Primary | ICD-10-CM

## 2019-11-08 DIAGNOSIS — Z86.73: ICD-10-CM

## 2019-11-08 DIAGNOSIS — Z85.828: ICD-10-CM

## 2019-11-08 DIAGNOSIS — M79.7: ICD-10-CM

## 2019-11-08 DIAGNOSIS — E78.00: ICD-10-CM

## 2019-11-08 DIAGNOSIS — F41.9: ICD-10-CM

## 2019-11-08 DIAGNOSIS — M54.5: ICD-10-CM

## 2019-11-08 DIAGNOSIS — Z86.14: ICD-10-CM

## 2019-11-08 DIAGNOSIS — E11.9: ICD-10-CM

## 2019-11-08 DIAGNOSIS — Z87.01: ICD-10-CM

## 2019-11-25 ENCOUNTER — HOSPITAL ENCOUNTER (EMERGENCY)
Dept: HOSPITAL 35 - ER | Age: 74
Discharge: HOME | End: 2019-11-25
Payer: COMMERCIAL

## 2019-11-25 VITALS — HEIGHT: 63 IN | WEIGHT: 220 LBS | BODY MASS INDEX: 38.98 KG/M2

## 2019-11-25 VITALS — SYSTOLIC BLOOD PRESSURE: 159 MMHG | DIASTOLIC BLOOD PRESSURE: 83 MMHG

## 2019-11-25 DIAGNOSIS — Z87.442: ICD-10-CM

## 2019-11-25 DIAGNOSIS — E78.00: ICD-10-CM

## 2019-11-25 DIAGNOSIS — F41.9: ICD-10-CM

## 2019-11-25 DIAGNOSIS — Z90.710: ICD-10-CM

## 2019-11-25 DIAGNOSIS — I10: ICD-10-CM

## 2019-11-25 DIAGNOSIS — E11.9: ICD-10-CM
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
## 2019-11-25 DIAGNOSIS — G89.29: ICD-10-CM

## 2019-11-25 DIAGNOSIS — Z86.73: ICD-10-CM
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
## 2019-11-25 DIAGNOSIS — J44.9: ICD-10-CM
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
## 2019-11-25 DIAGNOSIS — M79.7: ICD-10-CM

## 2019-11-25 DIAGNOSIS — Z86.14: ICD-10-CM

## 2019-11-25 DIAGNOSIS — S39.012A: Primary | ICD-10-CM

## 2020-01-01 ENCOUNTER — HOSPITAL ENCOUNTER (EMERGENCY)
Dept: HOSPITAL 35 - ER | Age: 75
LOS: 1 days | Discharge: HOME | End: 2020-01-02
Payer: COMMERCIAL

## 2020-01-01 VITALS — WEIGHT: 210.01 LBS | HEIGHT: 63 IN | BODY MASS INDEX: 37.21 KG/M2

## 2020-01-01 VITALS — DIASTOLIC BLOOD PRESSURE: 62 MMHG | SYSTOLIC BLOOD PRESSURE: 130 MMHG

## 2020-01-01 DIAGNOSIS — Z90.710: ICD-10-CM

## 2020-01-01 DIAGNOSIS — M79.7: ICD-10-CM
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
## 2020-01-01 DIAGNOSIS — Z86.73: ICD-10-CM
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
## 2020-01-01 DIAGNOSIS — S30.0XXA: Primary | ICD-10-CM

## 2020-01-01 DIAGNOSIS — E78.00: ICD-10-CM

## 2020-01-01 DIAGNOSIS — J44.9: ICD-10-CM

## 2020-01-01 DIAGNOSIS — Z90.49: ICD-10-CM
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
## 2020-01-01 DIAGNOSIS — I10: ICD-10-CM

## 2020-01-01 DIAGNOSIS — F41.9: ICD-10-CM

## 2020-01-01 DIAGNOSIS — Z87.891: ICD-10-CM

## 2020-01-10 ENCOUNTER — HOSPITAL ENCOUNTER (EMERGENCY)
Dept: HOSPITAL 35 - ER | Age: 75
Discharge: HOME | End: 2020-01-10
Payer: COMMERCIAL

## 2020-01-10 VITALS — WEIGHT: 221.41 LBS | BODY MASS INDEX: 39.23 KG/M2 | HEIGHT: 63 IN

## 2020-01-10 VITALS — SYSTOLIC BLOOD PRESSURE: 132 MMHG | DIASTOLIC BLOOD PRESSURE: 55 MMHG

## 2020-01-10 DIAGNOSIS — Z90.49: ICD-10-CM

## 2020-01-10 DIAGNOSIS — Z90.710: ICD-10-CM

## 2020-01-10 DIAGNOSIS — E11.9: ICD-10-CM

## 2020-01-10 DIAGNOSIS — M79.7: ICD-10-CM

## 2020-01-10 DIAGNOSIS — M54.5: ICD-10-CM

## 2020-01-10 DIAGNOSIS — I10: ICD-10-CM

## 2020-01-10 DIAGNOSIS — F41.9: ICD-10-CM

## 2020-01-10 DIAGNOSIS — Z86.73: ICD-10-CM

## 2020-01-10 DIAGNOSIS — J44.9: ICD-10-CM

## 2020-01-10 DIAGNOSIS — E78.00: ICD-10-CM

## 2020-01-10 DIAGNOSIS — J06.9: Primary | ICD-10-CM

## 2020-01-10 DIAGNOSIS — Z87.891: ICD-10-CM

## 2020-01-10 LAB
ALBUMIN SERPL-MCNC: 3.3 G/DL (ref 3.4–5)
ALT SERPL-CCNC: 19 U/L (ref 30–65)
ANION GAP SERPL CALC-SCNC: 8 MMOL/L (ref 7–16)
AST SERPL-CCNC: 16 U/L (ref 15–37)
BASOPHILS NFR BLD AUTO: 0.5 % (ref 0–2)
BILIRUB DIRECT SERPL-MCNC: < 0.1 MG/DL
BILIRUB SERPL-MCNC: 0.3 MG/DL
BUN SERPL-MCNC: 11 MG/DL (ref 7–18)
CALCIUM SERPL-MCNC: 9.6 MG/DL (ref 8.5–10.1)
CHLORIDE SERPL-SCNC: 100 MMOL/L (ref 98–107)
CO2 SERPL-SCNC: 31 MMOL/L (ref 21–32)
CREAT SERPL-MCNC: 0.9 MG/DL (ref 0.6–1)
EOSINOPHIL NFR BLD: 1.7 % (ref 0–3)
ERYTHROCYTE [DISTWIDTH] IN BLOOD BY AUTOMATED COUNT: 14.6 % (ref 10.5–14.5)
GLUCOSE SERPL-MCNC: 91 MG/DL (ref 74–106)
GRANULOCYTES NFR BLD MANUAL: 55.1 % (ref 36–66)
HCT VFR BLD CALC: 40.6 % (ref 37–47)
HGB BLD-MCNC: 13 GM/DL (ref 12–15)
LYMPHOCYTES NFR BLD AUTO: 36.3 % (ref 24–44)
MCH RBC QN AUTO: 28.5 PG (ref 26–34)
MCHC RBC AUTO-ENTMCNC: 32 G/DL (ref 28–37)
MCV RBC: 88.9 FL (ref 80–100)
MONOCYTES NFR BLD: 6.4 % (ref 1–8)
NEUTROPHILS # BLD: 4.3 THOU/UL (ref 1.4–8.2)
PLATELET # BLD: 177 THOU/UL (ref 150–400)
POTASSIUM SERPL-SCNC: 3.1 MMOL/L (ref 3.5–5.1)
PROT SERPL-MCNC: 7.4 G/DL (ref 6.4–8.2)
RBC # BLD AUTO: 4.57 MIL/UL (ref 4.2–5)
SODIUM SERPL-SCNC: 139 MMOL/L (ref 136–145)
WBC # BLD AUTO: 7.8 THOU/UL (ref 4–11)

## 2020-01-15 ENCOUNTER — HOSPITAL ENCOUNTER (INPATIENT)
Dept: HOSPITAL 35 - ER | Age: 75
LOS: 10 days | Discharge: NURSING, REHAB OR LONG-TERM CARE | DRG: 193 | End: 2020-01-25
Attending: INTERNAL MEDICINE | Admitting: INTERNAL MEDICINE
Payer: COMMERCIAL

## 2020-01-15 VITALS — BODY MASS INDEX: 39.34 KG/M2 | HEIGHT: 63 IN | WEIGHT: 222 LBS

## 2020-01-15 VITALS — SYSTOLIC BLOOD PRESSURE: 155 MMHG | DIASTOLIC BLOOD PRESSURE: 95 MMHG

## 2020-01-15 VITALS — SYSTOLIC BLOOD PRESSURE: 151 MMHG | DIASTOLIC BLOOD PRESSURE: 80 MMHG

## 2020-01-15 VITALS — DIASTOLIC BLOOD PRESSURE: 101 MMHG | SYSTOLIC BLOOD PRESSURE: 160 MMHG

## 2020-01-15 VITALS — DIASTOLIC BLOOD PRESSURE: 70 MMHG | SYSTOLIC BLOOD PRESSURE: 132 MMHG

## 2020-01-15 DIAGNOSIS — R10.32: ICD-10-CM

## 2020-01-15 DIAGNOSIS — Z90.49: ICD-10-CM

## 2020-01-15 DIAGNOSIS — I48.91: ICD-10-CM

## 2020-01-15 DIAGNOSIS — G93.1: ICD-10-CM

## 2020-01-15 DIAGNOSIS — E11.43: ICD-10-CM

## 2020-01-15 DIAGNOSIS — J18.9: Primary | ICD-10-CM

## 2020-01-15 DIAGNOSIS — E87.5: ICD-10-CM

## 2020-01-15 DIAGNOSIS — Z80.3: ICD-10-CM

## 2020-01-15 DIAGNOSIS — F05: ICD-10-CM

## 2020-01-15 DIAGNOSIS — Z87.891: ICD-10-CM

## 2020-01-15 DIAGNOSIS — Z79.891: ICD-10-CM

## 2020-01-15 DIAGNOSIS — F41.9: ICD-10-CM

## 2020-01-15 DIAGNOSIS — K59.00: ICD-10-CM

## 2020-01-15 DIAGNOSIS — J44.0: ICD-10-CM

## 2020-01-15 DIAGNOSIS — M17.12: ICD-10-CM

## 2020-01-15 DIAGNOSIS — Z98.1: ICD-10-CM

## 2020-01-15 DIAGNOSIS — M79.7: ICD-10-CM

## 2020-01-15 DIAGNOSIS — E66.9: ICD-10-CM

## 2020-01-15 DIAGNOSIS — F41.0: ICD-10-CM

## 2020-01-15 DIAGNOSIS — E78.5: ICD-10-CM

## 2020-01-15 DIAGNOSIS — J96.21: ICD-10-CM

## 2020-01-15 DIAGNOSIS — M54.5: ICD-10-CM

## 2020-01-15 DIAGNOSIS — K21.9: ICD-10-CM

## 2020-01-15 DIAGNOSIS — Z90.710: ICD-10-CM

## 2020-01-15 DIAGNOSIS — Z79.01: ICD-10-CM

## 2020-01-15 DIAGNOSIS — G47.33: ICD-10-CM

## 2020-01-15 DIAGNOSIS — G89.4: ICD-10-CM

## 2020-01-15 DIAGNOSIS — K31.84: ICD-10-CM

## 2020-01-15 DIAGNOSIS — I10: ICD-10-CM

## 2020-01-15 DIAGNOSIS — F33.1: ICD-10-CM

## 2020-01-15 DIAGNOSIS — E86.0: ICD-10-CM

## 2020-01-15 DIAGNOSIS — E78.00: ICD-10-CM

## 2020-01-15 DIAGNOSIS — K76.0: ICD-10-CM

## 2020-01-15 DIAGNOSIS — Z87.11: ICD-10-CM

## 2020-01-15 DIAGNOSIS — Z79.899: ICD-10-CM

## 2020-01-15 DIAGNOSIS — J44.1: ICD-10-CM

## 2020-01-15 LAB
ALBUMIN SERPL-MCNC: 3.1 G/DL (ref 3.4–5)
ALT SERPL-CCNC: 24 U/L (ref 30–65)
ANION GAP SERPL CALC-SCNC: 9 MMOL/L (ref 7–16)
AST SERPL-CCNC: 41 U/L (ref 15–37)
BASOPHILS NFR BLD AUTO: 0.3 % (ref 0–2)
BE(VIVO): 7.9 MMOL/L
BILIRUB SERPL-MCNC: 0.3 MG/DL
BUN SERPL-MCNC: 12 MG/DL (ref 7–18)
CALCIUM SERPL-MCNC: 9.8 MG/DL (ref 8.5–10.1)
CHLORIDE SERPL-SCNC: 101 MMOL/L (ref 98–107)
CO2 SERPL-SCNC: 31 MMOL/L (ref 21–32)
CREAT SERPL-MCNC: 0.8 MG/DL (ref 0.6–1)
EOSINOPHIL NFR BLD: 3.9 % (ref 0–3)
ERYTHROCYTE [DISTWIDTH] IN BLOOD BY AUTOMATED COUNT: 14.9 % (ref 10.5–14.5)
GLUCOSE SERPL-MCNC: 125 MG/DL (ref 74–106)
GRANULOCYTES NFR BLD MANUAL: 33.6 % (ref 36–66)
HCO3 BLD-SCNC: 33.7 MMOL/L (ref 22–26)
HCT VFR BLD CALC: 39.3 % (ref 37–47)
HGB BLD-MCNC: 12.7 GM/DL (ref 12–15)
LYMPHOCYTES NFR BLD AUTO: 55.6 % (ref 24–44)
MCH RBC QN AUTO: 28.5 PG (ref 26–34)
MCHC RBC AUTO-ENTMCNC: 32.4 G/DL (ref 28–37)
MCV RBC: 88 FL (ref 80–100)
MONOCYTES NFR BLD: 6.6 % (ref 1–8)
NEUTROPHILS # BLD: 1.5 THOU/UL (ref 1.4–8.2)
PCO2 BLD: 51.8 MMHG (ref 35–45)
PLATELET # BLD: 162 THOU/UL (ref 150–400)
PO2 BLD: 57.7 MMHG (ref 80–100)
POTASSIUM SERPL-SCNC: 3.8 MMOL/L (ref 3.5–5.1)
PROT SERPL-MCNC: 7.2 G/DL (ref 6.4–8.2)
RBC # BLD AUTO: 4.46 MIL/UL (ref 4.2–5)
SODIUM SERPL-SCNC: 141 MMOL/L (ref 136–145)
WBC # BLD AUTO: 4.6 THOU/UL (ref 4–11)

## 2020-01-15 PROCEDURE — 10081 I&D PILONIDAL CYST COMP: CPT

## 2020-01-15 PROCEDURE — 10790: CPT

## 2020-01-15 PROCEDURE — 10047: CPT

## 2020-01-15 NOTE — HC
[facility]
[address]ir[address]
[place]   [address]                     CONSULTATION                  
_______________________________________________________________________________
 
Name:       TERENCE BUCIO                Room #:         208-P       [facility] [place]  
M.R.#:      [id number]                       Account #:      [id number]  
Admission:  01/15/20    Attend Phys:    Matthew Shen MD   
Discharge:              Date of Birth:  [birth year]  
                                                          Report #: [id number]
                                                                    [id number]   
_______________________________________________________________________________
THIS REPORT FOR:   //name//                          
 
CC: Matthew Shen
    Anatoliy Alexandery
 
DATE OF SERVICE:  2020
 
 
HISTORY OF PRESENT ILLNESS:  The patient was admitted because of respiratory
difficulties.  She has a history of depression and anxiety.  Anxiety has cleared
up a little bit in part because of the steroid.  Unfortunately, she has been
more depressed lately with respect to some significant loss.   has really
been  for a year.  The patient denies hopelessness.
 
PAST PSYCHIATRIC HISTORY:  Long history of depression and anxiety, has been on
Lexapro recently.  Has been on alprazolam for many decades, but at this point,
is only taking 0.5 mg twice a day.  Buspirone has been suggested.
 
FAMILY HISTORY:  Noncontributory.
 
ALLERGIES:  No known medication allergies.
 
CURRENT MEDICATIONS:  Include metoprolol 50 mg daily, lisinopril 20 daily,
ceftriaxone 1 gram daily, glipizide 5 mg daily, amitriptyline 50 at bedtime,
budesonide p.r.n., Solu-Medrol.
 
PAST MEDICAL HISTORY:  Chronic pain, hypertension, diabetes, history of TIA.
 
MENTAL STATUS EXAM:   female, casually dressed, depressed, anxious,
normal rhythm of speech.  She is articulate.  No suicidal ideation, no homicidal
ideation, no hallucinations, no delusions.  Insight and judgment fair.
 
DIAGNOSES:  Major depressive disorder, recurrent, moderate; anxiety disorder,
not otherwise specified; panic disorder without agoraphobia.
 
RECOMMENDATIONS:  We will restart the buspirone and Lexapro and continue to use
the alprazolam twice daily.  I thought she was on Lexapro, but the chart
suggests she may actually be on Cymbalta.
 
Educated the patient that as many years that she has been on Xanax it is not
urgent that she taper off of this, but could be something worth considering. 
 
 
 
[facility]
[address]
[place]   [address]                     CONSULTATION                  
_______________________________________________________________________________
 
Name:       SARAH BUCIOYN                Room #:         208-P       [place]  
M.R.#:      [id number]                       Account #:      [id number]  
Admission:  01/15/20    Attend Phys:    Matthew Shen MD   
Discharge:              Date of Birth:  [birth year]  
                                                          Report #: [id number]
                                                                    [id number]   
_______________________________________________________________________________
She will just need to taper this medicine slowly, decreasing half a tablet at a
time and leaving weeks between any decrements in dose.
 
 
 
 
 
 
 
 
 
 
 
 
 
 
 
 
 
 
 
 
 
 
 
 
 
 
 
 
 
 
 
 
 
 
 
 
 
 
 
 
 
 
                         
   By:                               
                   
D: 20 1531                           _____________________________________
T: 20 2305                           Darrell Davey MD              /nt

## 2020-01-15 NOTE — NUR
ASSUMMED PT CARE AT APROXIMATELY 1700. PT AWAKE AND ORIENTED TO SELF.
FREQUEST REORIENTATION PROVIDED. ASSESSMENT AS CHARTED. PT AND PT'S SON
EDUCATED ABOUT POC. PT AND PT'S SON STATED UNDERSTANDING AND DENIED HAVING
FURTHER QUESTIONS. PT DENIES HAVING CHEST PAIN. PT DENIES HAVING SOB.  PT
DENIES HAVING ACUTE PAIN. ADMISSION COMPLETE. NOTIFIED DR. CASTILLO OF PT HAVING
ELEVATED BLOOD SUGAR. LOW SLIDING SCALE ORDERED AND IMPLEMENTED. PTS' HOME
DIABETIC MEDS ORDERED PER DR. CASTILLO. PT COMFORTABLE IN BED. PT DENIES HAVING
FURTHER CONCERNS. VITAL SIGNS STABLE. BLOOD SUGARS STABLE.

## 2020-01-16 VITALS — DIASTOLIC BLOOD PRESSURE: 76 MMHG | SYSTOLIC BLOOD PRESSURE: 152 MMHG

## 2020-01-16 VITALS — DIASTOLIC BLOOD PRESSURE: 66 MMHG | SYSTOLIC BLOOD PRESSURE: 159 MMHG

## 2020-01-16 VITALS — DIASTOLIC BLOOD PRESSURE: 83 MMHG | SYSTOLIC BLOOD PRESSURE: 154 MMHG

## 2020-01-16 LAB
BE(VIVO): -1.9 MMOL/L
HCO3 BLD-SCNC: 22.9 MMOL/L (ref 22–26)
PCO2 BLD: 39.3 MMHG (ref 35–45)
PO2 BLD: 119.3 MMHG (ref 80–100)

## 2020-01-16 NOTE — NUR
PATIENTS CARES WERE ASSSUMED AT SHIFT CHANGE. PATIENT WAS ASSESSED AND MEDS
WERE PASSED PATIENT WAS VERY TEARFUL AND STATED SHE WAS DEPRESSED AND ANXIETY.
THOUGHT SHE WAS IN HER HOUSE. PAPERS NNED SIGNED BY FAMILY. PATIENTS SON CAME
IN LATE LAST NIGHT TO CALME HER. PLEASE REMIND HIM TO SING PAPERS PATIENT
LIVES WITH HER SON. CONSULT WAS MADE FOR PSYC EVAL. HOURLY ROUNDING WAS DONE.
THE BED IS IN ALOW AND LOCKED POSITION.

## 2020-01-16 NOTE — EKG
[address]  [address]
Phone:  [phone]                    ELECTROCARDIOGRAM REPORT      
_______________________________________________________________________________
 
Name:       FORTUNATOSARAHTERENCE                Room #:         208-P       [facility] [place]  
M.R.#:      [id number]     Account #:      [id number]  
Admission:  01/15/20    Attend Phys:    Matthew Shen MD   
Discharge:              Date of Birth:  [birth year]  
                                                          Report #: [id number]
   [id number]
_______________________________________________________________________________
THIS REPORT FOR:   //name//                          
 
                          ED
                                       
Test Date:    2020-01-15               Test Time:    11:29:58
Pat Name:     TERENCE BUCIO           Department:   
Patient ID:   [id number]            Room:         208
Gender:       F                        Technician:   KAREN
:          1945               Requested By: Svitlana Tafoya
Order Number: [id number] MD:   Lalo Bailey
                                 Measurements
Intervals                              Axis          
Rate:         110                      P:            76
KY:           184                      QRS:          -39
QRSD:         124                      T:            85
QT:           346                                    
QTc:          469                                    
                           Interpretive Statements
Sinus tachycardia
ivcd
Baseline wander in lead(s) I,III,aVR,aVL,aVF,V1,V2,V3,V4
Compared to ECG 2019 19:47:56
 
Electronically Signed On 2020 17:18:39 CST by Laol Bailey
[email or web address]
 
 
 
 
 
 
 
 
 
 
 
 
 
 
 
 
 
 
  <ELECTRONICALLY SIGNED>
   By: Laol Bailey MD        
  20     1718
D: 01/15/20 1129                           _____________________________________
T: 01/15/20 1129                           Lalo Bailey MD          /EPI

## 2020-01-16 NOTE — NUR
PT ALERT AND ORIENTED X2. VSS. RECEIVED PRN PAIN MED FOR LOWER BACK PAIN WITH
PARTIAL RELIEF. CRITICAL RESULTS CALLED IN TO DR. STANLEY. NO NEW ORDERS. FALL
PRECAUTION IN PLACE. WILL CONTINUE TO MONITOR.

## 2020-01-17 VITALS — SYSTOLIC BLOOD PRESSURE: 152 MMHG | DIASTOLIC BLOOD PRESSURE: 69 MMHG

## 2020-01-17 VITALS — DIASTOLIC BLOOD PRESSURE: 67 MMHG | SYSTOLIC BLOOD PRESSURE: 142 MMHG

## 2020-01-17 VITALS — SYSTOLIC BLOOD PRESSURE: 144 MMHG | DIASTOLIC BLOOD PRESSURE: 83 MMHG

## 2020-01-17 VITALS — SYSTOLIC BLOOD PRESSURE: 124 MMHG | DIASTOLIC BLOOD PRESSURE: 79 MMHG

## 2020-01-17 VITALS — SYSTOLIC BLOOD PRESSURE: 137 MMHG | DIASTOLIC BLOOD PRESSURE: 73 MMHG

## 2020-01-17 LAB
ANION GAP SERPL CALC-SCNC: 5 MMOL/L (ref 7–16)
BUN SERPL-MCNC: 20 MG/DL (ref 7–18)
CALCIUM SERPL-MCNC: 9.2 MG/DL (ref 8.5–10.1)
CHLORIDE SERPL-SCNC: 104 MMOL/L (ref 98–107)
CO2 SERPL-SCNC: 30 MMOL/L (ref 21–32)
CREAT SERPL-MCNC: 0.9 MG/DL (ref 0.6–1)
GLUCOSE SERPL-MCNC: 202 MG/DL (ref 74–106)
POTASSIUM SERPL-SCNC: 4.3 MMOL/L (ref 3.5–5.1)
SODIUM SERPL-SCNC: 139 MMOL/L (ref 136–145)

## 2020-01-17 NOTE — NUR
PT REPORTED THIS MORNING THAT SHE WAS HAVING DIFFICULTY BREATHING. PT DID NOT
HAVE OXYGEN ON. O2 SATS WERE 92% ON ROOM AIR. OXYGEN APPLIED TO PATIENT,
PATIENT REPOSITIONED TO A HIGHER SITTING POSITION, RT CALLED AND PATIENT
RECEIVED BREATHING TREATMENT. WHEN DISCUSSING WITH PATIENT, PATIENT REPORTED
THAT SHE FELT LIKE SHE WAS CHOKING ON HER FOOD AND STATED SHE WAS TAKING
LARGER BITES THAN SHE HAD PREVIOUSLY BEEN ADVISED BY A GI DR. TO DO. ENSURED
PT AIRWAY WAS CLEAR AND REMAINED WITH PATIENT UNTIL SHE RESUMED TO BASELINE.
DR. RIVERA INFORMED OF PT STATUS WHEN HE ENTERED ROOM TO EXAMINE PATIENT.
ORDERED TO HOLD MEDS UNTIL PATIENT CLEARED BY SPEECH THERAPY. FOOD TRAY
REMOVED FROM PATIENT AREA. ST CONSULT PREVIOUSLY INITIATED .  DR. STANLEY
INFORMED OF PATIENT STATUS. HE EXAMINED IMAGING AND STATED NO CONCERN FOR
ASPIRATION.  AGREED WITH SPEECH EVALUATION.  PT REMAINS ON 3L NC AT THIS TIME.
DENIES ANY FURTHER BREATHING ISSUES.

## 2020-01-17 NOTE — NUR
CM ASSESSMENT:
CASE OPENED FOR DC PLANNING. CLINICAL INFO REVIEWED. MET WITH PT WHO IS WELL
KNOWN TO CM. PT LIVES IN HOUSE WITH HER SON AND DTR IN [place] AND THEY DO ALL
COOKING AND CLEANING. PT USES CANE, WALKER AND W/C AT HOME. USED TO HAVE HOME
O2 BUT STATES [facility] TOOK BACK AND HER PCP DR. ALONZO TESTED HER FOR O2
REQUIREMENT AND SHE DID NOT QUALIFY. SHE STATES HER NEBULIZER GOT DROPPED AND
NOT FUNCTIONAL. [facility] AND PT CHOOSE [facility] IF HH NEEDED. PT
HAS BEEN TO [facility] IN PAST AND IS OPEN TO REHAB STAY THERE AS WELL. THERAPY EVALS
PENDING.

## 2020-01-17 NOTE — NUR
Received pt from [facility] in the afternoon, on 3L of O2 via NC. Pt is incontinent,
ext FC to be placed. POc followed pt only complained of lower back pain which
is chonic endorsed to the night nurse.

## 2020-01-17 NOTE — NUR
PATIENTS CARES WERE ASSUMED AT SHIFT CHANGE. PATIENT WAS ASSESSED AND MEDS
WERE PASSED. PATIENT HAD A MUCH BRIGHTER AFFECT THAN YESTERDAYS SHIFT. SHE
ALSO VERBALIZED APPRECIATION FOR DR. SIMMONS. PATIENT DID SLEEP WELL APPROX 8
HOURS. BED ALARM IS ON AND THE BED IS IN A LOW AN LOCKED POSITION. HOURLY
ROUNDS WERE DONE

## 2020-01-18 VITALS — SYSTOLIC BLOOD PRESSURE: 143 MMHG | DIASTOLIC BLOOD PRESSURE: 60 MMHG

## 2020-01-18 VITALS — SYSTOLIC BLOOD PRESSURE: 148 MMHG | DIASTOLIC BLOOD PRESSURE: 73 MMHG

## 2020-01-18 VITALS — SYSTOLIC BLOOD PRESSURE: 140 MMHG | DIASTOLIC BLOOD PRESSURE: 65 MMHG

## 2020-01-18 NOTE — NUR
Pt. rested quietly at intervals during the night when checked on during
frequent rounds.  She was given po pain meds for c/o abdominal pain (see
emar) with some relief noted.  C/o feeling wheezy this am and prn breathing
treatment given with relief.  Continues on oxygen via nasal canula.  Bed alarm
is on.

## 2020-01-18 NOTE — NUR
Assumed pt care this am, spent most of her day on the chair with 3 L of O2 via
NC. FAll precautions in place, diet and ,edication are tolerated well. Pt
would complain of that is not relieved though pt would be asleep after pain
medication is given. Pt would keep on requesting on IV pain meds vs oral
stating this do not work , though no sings or distress haave been noted.
 
In the am the pt complained that her fo9od went dwn the wrong pipe, RT called
stated at 100% seen by Dr. Nguyen right after the event recommended that food
be moist vs dry. No issues in the afternoon. POC followed, erndorsed to the
night nurse.

## 2020-01-19 VITALS — DIASTOLIC BLOOD PRESSURE: 70 MMHG | SYSTOLIC BLOOD PRESSURE: 160 MMHG

## 2020-01-19 VITALS — DIASTOLIC BLOOD PRESSURE: 59 MMHG | SYSTOLIC BLOOD PRESSURE: 153 MMHG

## 2020-01-19 NOTE — NUR
progress
 
pt a/o x4. vss pain controlled with oxycodone, requested iv pain medication
but none was ordered and pt slept after oxycodone. has external female
catheter in place. lungs sounds wheezy RT tx's continue. iv levaquin and
solumedrol given as ordered. pt up with 1 gb and walker.

## 2020-01-19 NOTE — NUR
Progress
 
pt a/o x4 somewhat forgetful but calls appropriately. reports pain to lower
back left groin down to left knee taking oxycodone with effect sleeps after.
iv antibiotics continue pt tolerating regular liquids and regular diet. lungs
wheezy rt tx's continue continue poc.

## 2020-01-19 NOTE — NUR
Assumed pt care this am, VS have pt refused to get out of bed today. Nystatin
started and throat pain is better as verbalized by the pt. External FC
in place draining yellow urine. Pt has mentioned a different pain in the groin
area and that the MD mentioned orders for a cream was to be given. POC
followed, no signs of distress have been noted. Endorsed to the night nurse

## 2020-01-20 VITALS — DIASTOLIC BLOOD PRESSURE: 95 MMHG | SYSTOLIC BLOOD PRESSURE: 153 MMHG

## 2020-01-20 VITALS — DIASTOLIC BLOOD PRESSURE: 74 MMHG | SYSTOLIC BLOOD PRESSURE: 155 MMHG

## 2020-01-20 VITALS — DIASTOLIC BLOOD PRESSURE: 58 MMHG | SYSTOLIC BLOOD PRESSURE: 142 MMHG

## 2020-01-20 NOTE — NUR
ECHO TODAY, EF 55-60%, CT ABDOMEN COMPLETED TODAY.  REHAB CONSULT. OOB TO
CHAIR WITH MAX ASSIST. PATIENT STILL WHEEZING AND DOES NOT FEEL LIKE SHE IS
BETTER, O2 DECREASED TO 2LNC.

## 2020-01-20 NOTE — NUR
PROGRESS
 
PT A/0 X 4, FORGETFUL AT TIMES. C/O PAIN TO INNER LEFT LEG FROM GROIN TO KNEE
TAKING OXYCODONE WITH EFFECT PT SLEEPS AFTER BUT STATES IT DOESN'T HELP.
DICLOFENAC CREAM ORDERED. NYSTATIN SWISH AND SWALLOW IS WORKING MOUTH IS LESS
RED AND PT REPORTS LESS PAIN. SHE THINKS HER DIFFICULTY SWALLOWING IS DUE TO
THE ORAL IRRITATION. CONTINUE PLAN OF CARE.

## 2020-01-20 NOTE — 2DMMODE
[facility]
[employer]  [address]
Phone:  [phone] 2 D/M-MODE ECHOCARDIOGRAM     
_______________________________________________________________________________
 
Name:            TERENCE BUCIO                Room #:        452-P       ADM IN
M.R.#:           [id number]          Account #:     [id number]  
Admission:       01/15/20         Attend Phys:   MICHELLE Douglas
Discharge:                  Date of Birth: [birth year]  
                         Report #:      [id number]
        [id number]
_______________________________________________________________________________
THIS REPORT FOR:   //name//                          
 
 
--------------- APPROVED REPORT --------------
 
 
Study performed:  2020 11:45:02
 
EXAM: Comprehensive 2D, Doppler, and color-flow 
Echocardiogram 
Patient Location: Bedside   
Room #:  452     Status:  routine
 
      BSA:         2.02
HR: 83 bpm BP:          153/95 mmHg 
Rhythm: NSR     
 
Other Information 
Study Quality: Adequate
 
Indications
COPD
Diabetes
Dyspnea 
Hypertension/HDD
 
2D Dimensions
RVDd:  37.62 mm  
IVSd:  10.62 (7-11mm) LVOT Diam:  21.16 (18-24mm) 
LVDd:  52.89 mm  
PWd:  10.96 (7-11mm) Ascending Ao:  30.10 (22-36mm)
LVDs:  33.76 (25-40mm) 
Aortic Root:  27.01 mm IVC:  12.00 mm
 
Volumes
Left Atrial Volume (Systole) 
Single Plane 4CH:  50.35 mL Single Plane 2CH:  28.00 mL
    LA ESV Index:  20.00 mL/m2
 
Aortic Valve
AoV Peak Onesimo.:  1.60 m/s 
AO Peak Gr.:  10.21 mmHg LVOT Max P.86 mmHg
    LVOT Mean P.80 mmHg
    LVOT Max V:  1.10 m/s
    LVOT Mean V:  0.76 m/s
    LVOT V1 VTI:  24.81 cm
 
 
[facility]
[email or web address]
[place]  [address]
Phone:  [phone]                    2 D/M-MODE ECHOCARDIOGRAM     
_______________________________________________________________________________
 
Name:            FORTUNATOTERENCE                Room #:        452-P       [facility] [place]
[facility].#:           [id number]          Account #:     [id number]  
Admission:       01/15/20         Attend Phys:   MICHELLE Douglas
Discharge:                  Date of Birth: [birth year]  
                         Report #:      [id number]
        [id number]
_______________________________________________________________________________
KAITLIN Vmax: 2.43 cm2  
    SV (LVOT):  87.22 mL
 
Mitral Valve
    E/A Ratio:  0.8
    MV Decel. Time:  229.50 ms
MV E Max Onesimo.:  0.85 m/s 
MV A Onesimo.:  1.05 m/s  
MV PHT:  66.56 ms  
IVRT:  110.73 ms  
 
Pulmonary Valve
PV Peak Onesimo.:  1.08 m/s PV Peak Gr.:  4.67 mmHg
 
Pulmonary Vein
P Vein S:    0.65 m/s P Vein A:  0.24 m/s
P Vein D:   0.40 m/s P Vein A Dur.:  115.3 msec
P Vein S/D Ratio:  1.63 
 
Tricuspid Valve
TR Peak Onesimo.:  2.75 m/s  
TR Peak Gr.:  30.34 mmHg 
    PA Pressure:  35.00 mmHg
 
Left Ventricle
The left ventricle is normal size. There is normal LV segmental wall 
motion. There is normal left ventricular wall thickness. Left 
ventricular systolic function is normal. The left ventricular 
ejection fraction is within the normal range. LVEF is 55-60%. Grade I 
- abnormal relaxation pattern.
 
Right Ventricle
The right ventricle is normal size. The right ventricular systolic 
function is normal.
 
Atria
The left atrium size is normal. The right atrium size is 
normal.
 
Aortic Valve
The aortic valve is normal in structure. Mild aortic regurgitation. 
There is no aortic valvular stenosis.
 
Mitral Valve
The mitral valve is normal in structure. Trace to mild mitral 
regurgitation. No evidence of mitral valve stenosis.
 
 
[facility]
[address]
[place]  [address]
Phone:  [phone]                    2 D/M-MODE ECHOCARDIOGRAM     
_______________________________________________________________________________
 
Name:            TERENCE BUCIO                Room #:        452-P       [facility] [place]
.R.#:           [id number]          Account #:     [id number]  
Admission:       01/15/20         Attend Phys:   MICHELLE Douglas
Discharge:                  Date of Birth: [birth year]  
                         Report #:      [id number]
        [id number]
_______________________________________________________________________________
 
Tricuspid Valve
The tricuspid valve is normal in structure. There is trace tricuspid 
regurgitation. Estimated PAP 35 mmHg. There is mild pulmonary 
hypertension.
 
Pulmonic Valve
The pulmonary valve is normal in structure. There is no pulmonic 
valvular regurgitation.
 
Great Vessels
The aortic root is normal in size. IVC is normal in size and 
collapses >50% with inspiration.
 
Pericardium
There is no pericardial effusion.
 
<Conclusion>
The left ventricle is normal size.
There is normal left ventricular wall thickness.
Left ventricular systolic function is normal.
Grade I - abnormal relaxation pattern.
The right ventricle is normal size.
The left atrium size is normal.
The aortic valve is normal in structure.
Mild aortic regurgitation.
Trace to mild mitral regurgitation.
There is trace tricuspid regurgitation. Estimated PAP 35 mmHg.
 
 
 
 
 
 
 
 
 
 
 
 
 
 
 
 
  <ELECTRONICALLY SIGNED>
   By: Brendan Leslie MD               
  20     1225
D: 20 1225                           _____________________________________
T: 20 1225                           Brendan Leslie MD                 /INF

## 2020-01-21 VITALS — SYSTOLIC BLOOD PRESSURE: 112 MMHG | DIASTOLIC BLOOD PRESSURE: 48 MMHG

## 2020-01-21 VITALS — DIASTOLIC BLOOD PRESSURE: 51 MMHG | SYSTOLIC BLOOD PRESSURE: 112 MMHG

## 2020-01-21 VITALS — SYSTOLIC BLOOD PRESSURE: 100 MMHG | DIASTOLIC BLOOD PRESSURE: 56 MMHG

## 2020-01-21 VITALS — DIASTOLIC BLOOD PRESSURE: 58 MMHG | SYSTOLIC BLOOD PRESSURE: 139 MMHG

## 2020-01-21 NOTE — NUR
Assumed pt care this am, pain is managed with medication but claims that there
is no relief but would be asleep aftger pain meds are given. Pt stayed om the
recliner for most of the day. Diet and medications are well tolerated, VS
stable. Still moderate to max assists, edema noted on extremeties started on
furosemide. Endorsed to the night nurse and senior suite nurse since pt is to
transfer to [facility]. POC followed, no signs of distress have been noted.

## 2020-01-21 NOTE — NUR
PATIENT SEEN ON 1/20/20 BY DR. CABRERA. PATIENT AT THAT TIME PREFERRED TO
RETURN TO HOME AND NOT COME TO REHAB.  INFORMED LIAISON THIS DATE
THAT PATIENT WOULD LIKE TO COME TO . CASSIE IRAHETA, NP WITH DR. CABRERA,
REVIEWED CHART AND APPROVED TO ACCEPT PATIENT FOR ADMISSION. BED SHOULD BE
AVAILABLE ON 1/23/20.  INFORMED.

## 2020-01-21 NOTE — NUR
Pt. rested quietly during the night when checked on during frequent rounds.
She c/o some abdominal pain and was given po pain med (see emar) with some
relief noted.  No c/o of shortness of air.  Bed alarm is on.

## 2020-01-21 NOTE — NUR
CARE TEAM INDICATED THAT PT WILL LIKELY BE ABLE TO GO TO [facility] THURSDAY. PT IS TO
GO TO [facility] THIS EVENING. CM TO FOLLOW AS INIDCATED WITH DC PLANNING.

## 2020-01-22 VITALS — DIASTOLIC BLOOD PRESSURE: 58 MMHG | SYSTOLIC BLOOD PRESSURE: 116 MMHG

## 2020-01-22 VITALS — DIASTOLIC BLOOD PRESSURE: 52 MMHG | SYSTOLIC BLOOD PRESSURE: 103 MMHG

## 2020-01-22 VITALS — DIASTOLIC BLOOD PRESSURE: 64 MMHG | SYSTOLIC BLOOD PRESSURE: 110 MMHG

## 2020-01-22 LAB
ANION GAP SERPL CALC-SCNC: 1 MMOL/L (ref 7–16)
BUN SERPL-MCNC: 42 MG/DL (ref 7–18)
CALCIUM SERPL-MCNC: 9.1 MG/DL (ref 8.5–10.1)
CHLORIDE SERPL-SCNC: 101 MMOL/L (ref 98–107)
CO2 SERPL-SCNC: 33 MMOL/L (ref 21–32)
CREAT SERPL-MCNC: 1.1 MG/DL (ref 0.6–1)
GLUCOSE SERPL-MCNC: 84 MG/DL (ref 74–106)
POTASSIUM SERPL-SCNC: 5.9 MMOL/L (ref 3.5–5.1)
SODIUM SERPL-SCNC: 135 MMOL/L (ref 136–145)

## 2020-01-22 NOTE — NUR
DISCHARGE PLANNING.  HOME [facility] [address] [place] [address]. PATIENT REFERRAL
FAXED TO [facility] PER REQUEST IN ANTICIPATION OF PATIENTS DISCHARGE TO
HOME. CALL PLACED TO [facility] INTAKE. SPOKE WITH JEN HERNANDEZ. REFERRAL
RECEIVED AND ACCEPTING OF PATIENT AT DISCHARGE.

## 2020-01-22 NOTE — NUR
Assumed pt care at 1900. Pt is A/0X4, pleasant with cares. VSS. C/o pain to
lower back/left groin medicated with Percocet per EMAR with relief reported.
Pt medicated with Kayexalate prior shift K+ rechecked and is 5.9 pt notified.
Lupe NP notified and gave new orders. Female cath in place and
functional,no BM yet after Kayexalate. Resting without distress at this time
oxygen on at 1L/NC. Will continue to monitor pt.

## 2020-01-22 NOTE — NUR
ASSUMED CARE OF PATIENT AT 0715, PATIENT UP WITH MOD TO MAX ASSISTANCE TO BSC.
PATIENT AMBULATED TO DINING AREA, ON THE WAY BACK HER LEFT LEG WENT OUT, WE
LOWERED HER DOWN TO THE FLOOR, ASSISTED UP X 3 PEOPLE. PATIENT HAS LEFT UPPER
ARM IV IN PLACE. RECEIVED 1 IV ANTIBIOTIC THIS SHIFT. PATIENT ALERT AND
ORIENTED X 4. C/O BACK PAIN, LEFT KNEE PAIN AND LEFT GROIN AREA, RECEIVED
OXYCODONE 1 TABLET X 2 THIS SHIFT. PATIENT C/O ACID REFLUX, DR CASTILLO
NOTIFIED, ORDER FOR PROTONIX STARTED AT 1700. PATIENT INCONT. OF URINE, FEMALE
CATHETER IN PLACE. POTASSIUM LEVEL 6.1, CRITICAL LAB RECEIVED FROM SARA JACK RN NOTIFIED DR CASTILLO, ORDER FOR SODIUM POLYSTYRENE 30 GM X 1 THIS SHIFT,
REDRAW LAB POTASSIUMM AT 2100. NPO AFTER MIDNIGHT FOR NM GASTRIC EMPTYING
STUDY. WILL CONTINUE TO MONITOR.

## 2020-01-22 NOTE — NUR
ASSUMED PT CARE AT 2015 AS A TRANSFER FROM . PT'S VSS. IV IS IN HER UPPER
RIGHT ARM SALINE LOCKED. PT HAS COMPLAINTS OF LEFT SIDED GROIN PAIN AT A 5-6
AND PAIN IN HER LOWER BACK BETWEEN 8-10. PT TOLERATED EVENING MEDS WELL. I
GAVE OCYCODONE FOR PAIN. PT BLOOD SUGAR  - RECIEVED 3 UNITS OF LISPRO.
PT COMPLAINS ABOUT NOT GETTING HER AMITRIPTYLINE. PT SAID THAT SHE IS NOT
GOING TO BE ABLE TO SLEEP WITH OUT IT. PT WAS ON HER PHONE UNTIL SHE FELL
ASLEEP. AS SOON AS THE SHE TURNED OFF HER CELL PHONE SHE WENT RIGHT TO SLEEP.
PT SLEEPS THROUGH THE NIGHT. PT TALKS IN HER SLEEP AND SNORES LOUDLY. WILL
CONTINUE TO MONITOR.

## 2020-01-22 NOTE — NUR
SW reviewed chart and spoke with nursing and attending physician. Pt was
transferred [facility] and is progressing towards goals for
discharge. [facility] is able to accept pt. SW met with pt at bedside to discuss
discharge plan. Pt states she would prefer to go home with HH, but would
consider [facility] if medically necessary. Pt has used [facility] in the past for HH and is
agreeable with referral to [facility] . Pt's PCP is Dr. Wheat. Pt's
spouse passed away last year and she lives with her son.  GI consulted today.
Discharge planner faxed referral to . [facility] would have a bed for pt on Friday.
[facility] is following to assist as needed with discharge planning.

## 2020-01-23 VITALS — SYSTOLIC BLOOD PRESSURE: 119 MMHG | DIASTOLIC BLOOD PRESSURE: 54 MMHG

## 2020-01-23 VITALS — DIASTOLIC BLOOD PRESSURE: 60 MMHG | SYSTOLIC BLOOD PRESSURE: 122 MMHG

## 2020-01-23 VITALS — SYSTOLIC BLOOD PRESSURE: 124 MMHG | DIASTOLIC BLOOD PRESSURE: 56 MMHG

## 2020-01-23 VITALS — SYSTOLIC BLOOD PRESSURE: 122 MMHG | DIASTOLIC BLOOD PRESSURE: 60 MMHG

## 2020-01-23 VITALS — SYSTOLIC BLOOD PRESSURE: 120 MMHG | DIASTOLIC BLOOD PRESSURE: 80 MMHG

## 2020-01-23 NOTE — NUR
PT AOX4, VSS, PAIN IN MID BACK CONTROLLED WITH ORAL PAIN ANALGESIC. PT WENT
FOR A FOUR HOUR TEST IN GI LAB. PT WAS ABLE TO HANDLE REGULAR DIET, SHE
EXPERIENCED SOME COUGHING AFTER EATING. PT RECEIVED SCHEDULED MEDICATION WITH
SMALL SIPS OF WATER. IV ON RUE STILL PATENT. FALL PRECAUTIONS IN PLACE,
EXTERNAL CATH IN PLACE. CALL LIGHT/PERSONAL ITEMS IN REACH. WILL CONTINUE TO
MONITOR.

## 2020-01-23 NOTE — NUR
SW reviewed chart and spoke with nursing and attending physician. Pt to have
gastric emptying study today. SW met with pt at bedside to discuss discharge
plan. Pt has been accepted to [facility]. Pt states that she would prefer to go to
[facility], as she had been there in the past. Discharge
planner to fax referral to [facility]. SW notified [facility] liaison of new referral. LEA
is following to assist as needed with discharge planning.

## 2020-01-24 VITALS — SYSTOLIC BLOOD PRESSURE: 147 MMHG | DIASTOLIC BLOOD PRESSURE: 76 MMHG

## 2020-01-24 VITALS — SYSTOLIC BLOOD PRESSURE: 110 MMHG | DIASTOLIC BLOOD PRESSURE: 76 MMHG

## 2020-01-24 VITALS — SYSTOLIC BLOOD PRESSURE: 139 MMHG | DIASTOLIC BLOOD PRESSURE: 71 MMHG

## 2020-01-24 LAB
ANION GAP SERPL CALC-SCNC: 5 MMOL/L (ref 7–16)
BUN SERPL-MCNC: 42 MG/DL (ref 7–18)
CALCIUM SERPL-MCNC: 8.7 MG/DL (ref 8.5–10.1)
CHLORIDE SERPL-SCNC: 99 MMOL/L (ref 98–107)
CO2 SERPL-SCNC: 34 MMOL/L (ref 21–32)
CREAT SERPL-MCNC: 1.2 MG/DL (ref 0.6–1)
GLUCOSE SERPL-MCNC: 159 MG/DL (ref 74–106)
POTASSIUM SERPL-SCNC: 4.3 MMOL/L (ref 3.5–5.1)
SODIUM SERPL-SCNC: 138 MMOL/L (ref 136–145)

## 2020-01-24 NOTE — NUR
ASSESSMENT COMPLETED.PT DENIED PAIN,N/V SO FAR.BG MONITORED  NO
COVERAGE NEEDED.T YUN.FAMILY AT PT'S BEDSIDE AT START OF SHIFT TO CELEBRATE
HER BIRTHDAY.PT IN A VERY GOOD MOOD.BLE EDEMA NOTED.PT HAS BRUISING ON HER
ARMS FROM BLOOD DRAW.PT RESTING ON HER BED AT THIS TIME.FALL PRECAUTIONS IN
PLACE,CALL LIGHT WITHIN REACH.

## 2020-01-24 NOTE — NUR
Assess due to length of stay.  Admit with pneumonitis, acute hypoxic
respiratory failure.  GERD with gastroparesis and has been started on reglan.
BG improving with decrease in steroids.  Eating % meals.  Wt stable,
obese.  Likely dc soon. Low nutrition risk

## 2020-01-24 NOTE — NUR
ASSUMED CARE OF PATIENT AT 0715, PATIENT ALERT AND ORIENTED X4. UP WITH MOD.
ASSIST X 1 TO CHAIR OR BSC. PATIENT HAS FEMALE CATHETER IN PLACE. PATIENT
DENIES PAIN THIS AM. PATIENT DID C/O PAIN THIS AFTERNOON, OXYCODONE 1 TABLET
GIVEN, WITH GOOD RELIEF. PATIENT HAS RIGHT UPPER ARM IV IN PLACE, RECEIVED IV
LASIX 40 MG X 1 THIS SHIFT. BLOOD SUGAR MONITORING AS ORDERED, LAST , 9
UNITS OF INSULIN GIVEN. WILLIAM BARTON APPLIED THIS AM, DUE EDEMA BILATERAL LEGS. O2
AT 2 LITERS/NC, DR CASTILLO HERE THIS AM, TURNED O2 OFF, CHECKS SATS IF SATS
DROP, NOTIFY DR CASTILLO. BREATHING TREATMENTS SCHEDULED. WILL DISCHARGE TO
[facility] TOMORROW. WILL CONTINUE TO MONITOR.

## 2020-01-24 NOTE — NUR
SW reviewed chart and spoke with nursing. Pt with BLE edema. SW met with pt at
bedside to provide update and notify of [facility]'s acceptance. Pt is aware and in
agreement with discharge plan. SW is following to assist as needed with
discharge planning.

## 2020-01-25 VITALS — SYSTOLIC BLOOD PRESSURE: 151 MMHG | DIASTOLIC BLOOD PRESSURE: 63 MMHG

## 2020-01-25 NOTE — NUR
PT IS AOX4, VSS, NO C/O PAIN AT THIS TIME. PT IS IN GOOD SPIRITS ABOUT
DISCHARGE TODAY. PT ON RA, UP WITH 1-2 PERSON ASSIST. PT HAS WILLIAM HOSE ON BLE
D/T EDEMA. PT TOLERATING MEDS WELL. IV PATENT SL IN RUE. CALL LIGHT/PERSONAL
ITEMS IN REACH. RT GAVE BREATHING TREATMENT THIS AM. WILL CONTINUE TO MONITOR.

## 2020-01-25 NOTE — NUR
ASSUMED CARE OF PATIENT AT SHIFT CHANGE. ASSESSMENT AS CHARTED. MEDICATIONS
GIVEN AT 2130 PER MAR AND PATIENT REQUEST. VOLTAREN GEL FOR GROIN AREA
REFUSED. VSS. PATIENT VOICES PAIN 8/10 ON LOWER BACK. PATIENT IS ALERT &
ORIENTED X4 BUT CAN BE FORGETFUL AT TIMES. O2 SATS ARE 97% ON 2L. PATIENT C/O
"CHOKING" AND "NOT BEING ABLE TO BREATHE" AFTER EATING DINNER. RT WAS IN ROOM
WHEN PT VOICED THIS CONCERN AND REPORTED AN O2 SAT OF 94% ON RA; HOWEVER PER
PATIENT REQUEST, 2L OF O2 VIA NC WERE PUT BACK ON PATIENT. PATIENT SEEMS CALM
THIS SHIFT WITH SOME ANXIETY. PATIENT STATED HER NIGHT MEDS "HELP A LOT WITH
MY ANXIETY" AND REFERRED BACK TO HER HUSBANDS RECENT PASSING. PATIENT CONTINUE
TO HAVE COURSE LUNG SOUNDS BILATERALLY; ENCOURAGED TO KEEP BED IN SEMI-FOWLERS
TO FOWLERS POSITION. PATIENT WAS ON FUROSIMIDE THX, NOW DISCONTINUED. PATIENT
CONTINUES TO HAVE +3 EDEMA ON UPPER EXTREMITIES. WILLIAM HOSE IS HELPING WITH
EDEMA ON BLE. PATIENT IS TURNING SELF TONIGHT. BARRIER CREAM APPLIED AS
EXTERNAL FEMALE CATH IS IN PLACE. PATIENT IS ENCOURAGED TO DRINK WATER WITH
FOOD TO HELP WITH THE FEELING OF CHOKING AFTER SWALLOWING FOOD. PATIENT SPOKE
WITH FAMILY OVER PHONE BEFORE BED AND IS NOW RESTING IN BED. FALL PRECAUTIONS
ARE IN PLACE. PATIENT CALLS OUT FOR HELP AS NEEDED. PLAN IS FOR PATIENT TO D/C
TODAY 1/25 TO [facility]. WILL CONTINUE TO MONITOR AND FOLLOW PLAN OF
CARE

## 2020-01-25 NOTE — NUR
[facility], NURSE SPOKE TO BERRY.
MESSAGE LEFT FOR ZIZY NURSE SUPERVISOR TO CALL BACK FOR REPORT. DR. CASTILLO
GAVE DISCHARGE ORDERS, CHART COPIED AND PRESCRIPTIONS RECEIVED TO SEND WITH PT
ON DISCHARGE. IV IN RUE D/C'D, PT TOLERATING DIET, NO PAIN AT THIS TIME. WILL
CONTINUE TO MONITOR PT.

## 2020-01-28 NOTE — HC
[facility]
[address]
[place]   [address]                     CONSULTATION                  
_______________________________________________________________________________
 
Name:       TERENCE BUCIO                Room #:         404-P       [facility] [place]  
M.R.#:      [id number]                       Account #:      [id number]  
Admission:  01/15/20    Attend Phys:    Matthew Shen MD   
Discharge:  01/25/20    Date of Birth:  [birth year]  
                                                          Report #: [id number]
                                                                    [id number]   
_______________________________________________________________________________
THIS REPORT FOR:   //name//                          
 
CC: Matthew Shen
    Anatoliy Wheat
 
DATE OF SERVICE:  01/20/2020
 
 
HISTORY OF PRESENT ILLNESS:  The patient is a 74-year-old female admitted with
increased shortness of breath.  She was given a flu shot, Z-CLARY.  She developed
acute hypoxic respiratory failure, acute on chronic COPD exacerbation, acute
pneumonia.  She was noted to have encephalopathy thought to likely be hypoxia
related with some sundowning.  She had failed outpatient antibiotics.  Pulmonary
medicine is involved.  Continuing bronchodilator therapy with steroids,
Levaquin.  She has had an overall functional decline and we are seeing her in
rehabilitation medicine consultation.
 
PAST MEDICAL HISTORY:  Includes several prior back surgeries and has had I and D
of lumbar wound with MRSA.  She had lumbar surgery in 2000, 2013, and 2016.  She
has had a history of thoracentesis lung surgery due to fluid buildup in 2004,
non-insulin dependent diabetes mellitus, fibromyalgia, paroxysmal atrial
fibrillation, and left pleurodesis.
 
MEDICATIONS:  Please see the full medication listing.
 
ALLERGIES:  No known drug allergies.
 
SOCIAL HISTORY:  Her son and daughter-in-law live with her in a house,
premorbidly ambulated without a roller walker with family present.  Reported
being independent in the house.  She was not on O2 premorbidly, although
apparently had some O2 at bedtime p.r.n. in the past.
 
REVIEW OF SYSTEMS:  She is gradually feeling better, no current complaints of
chest pain, shortness of breath.  She does have some left abdominal discomfort
and currently has a CT scan pending.
 
PHYSICAL EXAMINATION:
GENERAL:  She is an obese 74-year-old white female in no obvious distress.
VITAL SIGNS:  Last recorded temperature 98.4, pulse 75, respirations 16, and
blood pressure 153/95.
NEUROLOGIC:  She is alert.  She will follow basic 1 step commands.  Tends to be
verbose somewhat tangential.  Facies are symmetric.  She is on nasal prong O2. 
Functional range of motion of the upper extremity strength is grade 3+ to 4-/5. 
DTRs are trace to 1.  Lower extremities, no focal calf swelling, functional
range of motion, strength is grade 3+ to 4-/5.  DTRs are trace to 1.  She has
been mod assist with sit to stand and has ambulated 15-35 feet front-wheeled
 
 
 
[address]   [address]                     CONSULTATION                  
_______________________________________________________________________________
 
Name:       TERENCE BUCIO                Room #:         404-P       [facility] [place]  
M.R.#:      [id number]                       Account #:      [id number]  
Admission:  01/15/20    Attend Phys:    Matthew Shen MD   
Discharge:  01/25/20    Date of Birth:  [birth year]  
                                                          Report #: [id number]
                                                                    [id number]   
_______________________________________________________________________________
walker.  She is on nasal prong O2, currently on 2 liters.
 
ASSESSMENT:  A 74-year-old white female with the following problem list:
1.  Encephalopathy appears multifactorial with a likely hypoxic component.  She
has had some sundowning.  Appears to be improving overall.
2.  Acute on chronic hypoxic respiratory failure.
3.  Chronic obstructive pulmonary disease.
4.  Pneumonitis.
5.  Hypertension.
6.  Hyperlipidemia.
7.  Diabetes mellitus.
8.  Exogenous obesity.
9.  History of multiple lumbar back surgeries.
10.  History of chronic pain syndrome.
11.  History of fibromyalgia.
12.  History of lumbar wound with MRSA.
 
PLAN:  The patient is gradually improving.  She notes that she will have a
daughter-in-law that is with her and her son works with a [employer] and is
home at times.  Her focus is on returning directly home with some home
healthcare and she is not interested in any acute inpatient rehabilitation at
this time.  I recommended she continue to work in therapies as she is improving
and we will continue to follow along regarding her progress.  Hopefully, she can
be discharged directly home with home health care as she further medically
stabilizes.
 
Thank you for asking us to assist in this patient's care.
 
 
 
 
 
 
 
 
 
 
 
 
 
 
 
 
 
  <ELECTRONICALLY SIGNED>
   By: Avni Euceda MD         
  01/28/20     1058
D: 01/20/20 1451                           _____________________________________
T: 01/20/20 1703                           Avni Euceda MD           /[facility]

## 2020-02-18 ENCOUNTER — HOSPITAL ENCOUNTER (EMERGENCY)
Dept: HOSPITAL 35 - ER | Age: 75
Discharge: HOME | End: 2020-02-18
Payer: COMMERCIAL

## 2020-02-18 VITALS — WEIGHT: 242.99 LBS | HEIGHT: 63 IN | BODY MASS INDEX: 43.05 KG/M2

## 2020-02-18 VITALS — DIASTOLIC BLOOD PRESSURE: 44 MMHG | SYSTOLIC BLOOD PRESSURE: 110 MMHG

## 2020-02-18 DIAGNOSIS — E87.6: Primary | ICD-10-CM

## 2020-02-18 DIAGNOSIS — I27.20: ICD-10-CM

## 2020-02-18 DIAGNOSIS — E78.00: ICD-10-CM

## 2020-02-18 DIAGNOSIS — E66.09: ICD-10-CM

## 2020-02-18 DIAGNOSIS — R60.9: ICD-10-CM

## 2020-02-18 DIAGNOSIS — I10: ICD-10-CM

## 2020-02-18 DIAGNOSIS — Z90.710: ICD-10-CM

## 2020-02-18 DIAGNOSIS — Z86.711: ICD-10-CM

## 2020-02-18 DIAGNOSIS — Z87.891: ICD-10-CM

## 2020-02-18 DIAGNOSIS — F41.9: ICD-10-CM

## 2020-02-18 LAB
ALBUMIN SERPL-MCNC: 3.3 G/DL (ref 3.4–5)
ALT SERPL-CCNC: 26 U/L (ref 30–65)
ANION GAP SERPL CALC-SCNC: 4 MMOL/L (ref 7–16)
ANISOCYTOSIS BLD QL SMEAR: (no result)
AST SERPL-CCNC: 29 U/L (ref 15–37)
BILIRUB SERPL-MCNC: 0.3 MG/DL
BILIRUB UR-MCNC: NEGATIVE MG/DL
BUN SERPL-MCNC: 42 MG/DL (ref 7–18)
CALCIUM SERPL-MCNC: 9.6 MG/DL (ref 8.5–10.1)
CHLORIDE SERPL-SCNC: 103 MMOL/L (ref 98–107)
CO2 SERPL-SCNC: 35 MMOL/L (ref 21–32)
COLOR UR: YELLOW
CREAT SERPL-MCNC: 1.4 MG/DL (ref 0.6–1)
EOSINOPHIL NFR BLD: 4 % (ref 0–3)
ERYTHROCYTE [DISTWIDTH] IN BLOOD BY AUTOMATED COUNT: 17.1 % (ref 10.5–14.5)
GLUCOSE SERPL-MCNC: 84 MG/DL (ref 74–106)
GRANULOCYTES NFR BLD MANUAL: 66 % (ref 36–66)
HCT VFR BLD CALC: 35.4 % (ref 37–47)
HGB BLD-MCNC: 11.3 GM/DL (ref 12–15)
KETONES UR STRIP-MCNC: NEGATIVE MG/DL
LYMPHOCYTES NFR BLD AUTO: 19 % (ref 24–44)
MCH RBC QN AUTO: 28.5 PG (ref 26–34)
MCHC RBC AUTO-ENTMCNC: 31.8 G/DL (ref 28–37)
MCV RBC: 89.6 FL (ref 80–100)
METAMYELOCYTES NFR BLD: 1 %
MONOCYTES NFR BLD: 2 % (ref 1–8)
NEUTROPHILS # BLD: 7.4 THOU/UL (ref 1.4–8.2)
NEUTS BAND NFR BLD: 8 % (ref 0–8)
PLATELET # BLD: 222 THOU/UL (ref 150–400)
POLYCHROMASIA BLD QL SMEAR: (no result)
POTASSIUM SERPL-SCNC: 3.4 MMOL/L (ref 3.5–5.1)
PROT SERPL-MCNC: 7.3 G/DL (ref 6.4–8.2)
RBC # BLD AUTO: 3.95 MIL/UL (ref 4.2–5)
RBC # UR STRIP: NEGATIVE /UL
SODIUM SERPL-SCNC: 142 MMOL/L (ref 136–145)
SP GR UR STRIP: 1.01 (ref 1–1.03)
URINE CLARITY: CLEAR
URINE GLUCOSE-RANDOM*: NEGATIVE
URINE LEUKOCYTES-REFLEX: (no result)
URINE NITRITE-REFLEX: NEGATIVE
URINE PROTEIN (DIPSTICK): NEGATIVE
UROBILINOGEN UR STRIP-ACNC: 0.2 E.U./DL (ref 0.2–1)
WBC # BLD AUTO: 10 THOU/UL (ref 4–11)

## 2020-02-18 NOTE — EKG
[facility]
[place]   [address]                     ELECTROCARDIOGRAM REPORT      
_______________________________________________________________________________
 
Name:       TERENCE BUCIO                Room #:                     [facility].#:      [id number]                       Account #:      [id number]  
Admission:  20    Attend Phys:                          
Discharge:              Date of Birth:  [birth year]  
                                                          Report #: [id number]
                                                                    [id number]
_______________________________________________________________________________
THIS REPORT FOR:  
 
cc:  Anatoliy Wheat MD, Bernard O. MD Couchonnal, Luis F. MD                                            ~
THIS REPORT FOR:   //name//                          
 
                         [facility] ED
                                       
Test Date:    2020               Test Time:    15:55:37
Pat Name:     TERENCE BUCIO           Department:   
Patient ID:   [id number]            Room:          
Gender:                               Technician:   [facility]
:          1945               Requested By: Svitlana Tafoya
Order Number: [id number] MD:   Lalo Bailey
                                 Measurements
Intervals                              Axis          
Rate:         98                       P:            25
LA:           220                      QRS:          -22
QRSD:         103                      T:            74
QT:           352                                    
QTc:          450                                    
                           Interpretive Statements
Sinus rhythm
Prolonged LA interval
Borderline left axis deviation
Low voltage, precordial leads
Abnormal R-wave progression, late transition
Compared to ECG 01/15/2020 11:29:58
 
Electronically Signed On 2020 17:04:04 CST by Lalo Bailey
[email or web address]
 
 
 
 
 
 
 
 
 
 
 
 
  <ELECTRONICALLY SIGNED>
   By: Lalo Bailey MD        
  20     1704
D: 20 1555                           _____________________________________
T: 20 1555                           Lalo Bailey MD          /EPI

## 2020-02-20 ENCOUNTER — HOSPITAL ENCOUNTER (INPATIENT)
Dept: HOSPITAL 35 - ER | Age: 75
LOS: 11 days | Discharge: NURSING, REHAB OR LONG-TERM CARE | DRG: 689 | End: 2020-03-02
Attending: HOSPITALIST | Admitting: HOSPITALIST
Payer: COMMERCIAL

## 2020-02-20 VITALS — SYSTOLIC BLOOD PRESSURE: 138 MMHG | DIASTOLIC BLOOD PRESSURE: 57 MMHG

## 2020-02-20 VITALS
HEIGHT: 63 IN | WEIGHT: 247.56 LBS | DIASTOLIC BLOOD PRESSURE: 60 MMHG | BODY MASS INDEX: 43.86 KG/M2 | SYSTOLIC BLOOD PRESSURE: 145 MMHG

## 2020-02-20 VITALS — SYSTOLIC BLOOD PRESSURE: 101 MMHG | DIASTOLIC BLOOD PRESSURE: 45 MMHG

## 2020-02-20 VITALS — DIASTOLIC BLOOD PRESSURE: 65 MMHG | SYSTOLIC BLOOD PRESSURE: 124 MMHG

## 2020-02-20 VITALS — SYSTOLIC BLOOD PRESSURE: 134 MMHG | DIASTOLIC BLOOD PRESSURE: 60 MMHG

## 2020-02-20 DIAGNOSIS — J18.9: ICD-10-CM

## 2020-02-20 DIAGNOSIS — S91.302A: ICD-10-CM

## 2020-02-20 DIAGNOSIS — N17.0: ICD-10-CM

## 2020-02-20 DIAGNOSIS — E44.0: ICD-10-CM

## 2020-02-20 DIAGNOSIS — Z87.01: ICD-10-CM

## 2020-02-20 DIAGNOSIS — I50.30: ICD-10-CM

## 2020-02-20 DIAGNOSIS — I13.0: ICD-10-CM
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
## 2020-02-20 DIAGNOSIS — I27.20: ICD-10-CM

## 2020-02-20 DIAGNOSIS — M79.7: ICD-10-CM

## 2020-02-20 DIAGNOSIS — Z98.1: ICD-10-CM

## 2020-02-20 DIAGNOSIS — E78.5: ICD-10-CM

## 2020-02-20 DIAGNOSIS — J44.9: ICD-10-CM

## 2020-02-20 DIAGNOSIS — E78.00: ICD-10-CM

## 2020-02-20 DIAGNOSIS — F41.9: ICD-10-CM

## 2020-02-20 DIAGNOSIS — Z90.710: ICD-10-CM

## 2020-02-20 DIAGNOSIS — M25.562: ICD-10-CM

## 2020-02-20 DIAGNOSIS — I48.0: ICD-10-CM
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
## 2020-02-20 DIAGNOSIS — Z28.21: ICD-10-CM

## 2020-02-20 DIAGNOSIS — N39.0: Primary | ICD-10-CM

## 2020-02-20 DIAGNOSIS — I08.1: ICD-10-CM
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
## 2020-02-20 DIAGNOSIS — N18.9: ICD-10-CM

## 2020-02-20 DIAGNOSIS — E11.51: ICD-10-CM

## 2020-02-20 DIAGNOSIS — Z79.01: ICD-10-CM

## 2020-02-20 DIAGNOSIS — J44.0: ICD-10-CM
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
## 2020-02-20 DIAGNOSIS — E66.9: ICD-10-CM

## 2020-02-20 DIAGNOSIS — I89.0: ICD-10-CM

## 2020-02-20 DIAGNOSIS — G47.00: ICD-10-CM

## 2020-02-20 DIAGNOSIS — E11.22: ICD-10-CM

## 2020-02-20 DIAGNOSIS — Z87.891: ICD-10-CM

## 2020-02-20 DIAGNOSIS — Z79.899: ICD-10-CM

## 2020-02-20 LAB
ALBUMIN SERPL-MCNC: 2.7 G/DL (ref 3.4–5)
ALT SERPL-CCNC: 20 U/L (ref 30–65)
ANION GAP SERPL CALC-SCNC: 7 MMOL/L (ref 7–16)
AST SERPL-CCNC: 31 U/L (ref 15–37)
BACTERIA-REFLEX: (no result) /HPF
BASOPHILS NFR BLD AUTO: 0.7 % (ref 0–2)
BE(VIVO): 7.8 MMOL/L
BENZODIAZ UR-MCNC: POSITIVE UG/L
BILIRUB SERPL-MCNC: 0.3 MG/DL
BILIRUB UR-MCNC: NEGATIVE MG/DL
BUN SERPL-MCNC: 36 MG/DL (ref 7–18)
CALCIUM SERPL-MCNC: 8.6 MG/DL (ref 8.5–10.1)
CHLORIDE SERPL-SCNC: 104 MMOL/L (ref 98–107)
CO2 SERPL-SCNC: 32 MMOL/L (ref 21–32)
COLOR UR: YELLOW
CREAT SERPL-MCNC: 1.3 MG/DL (ref 0.6–1)
EOSINOPHIL NFR BLD: 1 % (ref 0–3)
ERYTHROCYTE [DISTWIDTH] IN BLOOD BY AUTOMATED COUNT: 17.2 % (ref 10.5–14.5)
GLUCOSE SERPL-MCNC: 156 MG/DL (ref 74–106)
GRANULOCYTES NFR BLD MANUAL: 69.5 % (ref 36–66)
HCO3 BLD-SCNC: 32.8 MMOL/L (ref 22–26)
HCT VFR BLD CALC: 30.3 % (ref 37–47)
HGB BLD-MCNC: 9.6 GM/DL (ref 12–15)
KETONES UR STRIP-MCNC: NEGATIVE MG/DL
LIPASE: 82 U/L (ref 73–393)
LYMPHOCYTES NFR BLD AUTO: 22.3 % (ref 24–44)
MCH RBC QN AUTO: 28.4 PG (ref 26–34)
MCHC RBC AUTO-ENTMCNC: 31.7 G/DL (ref 28–37)
MCV RBC: 89.6 FL (ref 80–100)
MONOCYTES NFR BLD: 6.5 % (ref 1–8)
MUCUS: (no result) STRN/LPF
NEUTROPHILS # BLD: 6.6 THOU/UL (ref 1.4–8.2)
PCO2 BLD: 48.1 MMHG (ref 35–45)
PLATELET # BLD: 180 THOU/UL (ref 150–400)
PO2 BLD: 63.5 MMHG (ref 80–100)
POTASSIUM SERPL-SCNC: 3.1 MMOL/L (ref 3.5–5.1)
PROT SERPL-MCNC: 6.1 G/DL (ref 6.4–8.2)
RBC # BLD AUTO: 3.39 MIL/UL (ref 4.2–5)
RBC # UR STRIP: (no result) /UL
RBC #/AREA URNS HPF: (no result) /HPF (ref 0–2)
SODIUM SERPL-SCNC: 143 MMOL/L (ref 136–145)
SP GR UR STRIP: 1.02 (ref 1–1.03)
SQUAMOUS: (no result) /LPF (ref 0–3)
URINE CLARITY: CLEAR
URINE GLUCOSE-RANDOM*: NEGATIVE
URINE LEUKOCYTES-REFLEX: (no result)
URINE NITRITE-REFLEX: NEGATIVE
URINE PROTEIN (DIPSTICK): NEGATIVE
UROBILINOGEN UR STRIP-ACNC: 0.2 E.U./DL (ref 0.2–1)
WBC # BLD AUTO: 9.5 THOU/UL (ref 4–11)

## 2020-02-20 PROCEDURE — 10081 I&D PILONIDAL CYST COMP: CPT

## 2020-02-20 PROCEDURE — 10091: CPT

## 2020-02-20 PROCEDURE — 10040 EXTRACTION: CPT

## 2020-02-20 PROCEDURE — 10047: CPT

## 2020-02-20 NOTE — EKG
[facility] [place]   [address]                     ELECTROCARDIOGRAM REPORT      
_______________________________________________________________________________
 
Name:       TERENCE BUCIO              Room #:         170-6       ADM IN  
M.R.#:      [id number]                       Account #:      [id number]  
Admission:  20    Attend Phys:    Zi Ayala MD    
Discharge:              Date of Birth:  [birth year]  
                                                          Report #: [id number]
                                                                    [id number]
_______________________________________________________________________________
THIS REPORT FOR:  
 
cc:  Anatoliy Wheat MD, Bernard O. MD Couchonnal, Luis F. MD                                            ~
THIS REPORT FOR:   //name//                          
 
                         [facility] ED
                                       
Test Date:    2020               Test Time:    01:03:50
Pat Name:     TERENCE BUCIO           Department:   
Patient ID:   [id number]            Room:         170
Gender:       F                        Technician:   peter doran rn
:          1945               Requested By: Scott Cope
Order Number: [id number] MD:   Lalo Bailey
                                 Measurements
Intervals                              Axis          
Rate:         98                       P:            28
IA:           207                      QRS:          -18
QRSD:         107                      T:            69
QT:           360                                    
QTc:          460                                    
                           Interpretive Statements
Sinus rhythm
Borderline prolonged IA interval
Borderline left axis deviation
Minimal ST depression, lateral leads
Compared to ECG 2020 15:55:37
ST (T wave) deviation now present
 
Electronically Signed On 2020 8:18:18 CST by Lalo Bailey
[email or web address]
 
 
 
 
 
 
 
 
 
 
 
 
  <ELECTRONICALLY SIGNED>
   By: Lalo Bailey MD        
  20     0818
D: 20                           _____________________________________
T: 20                           Lalo Bailey MD          /EPI

## 2020-02-20 NOTE — NUR
Pt admitted from ER due to UTI, transferred to room safely. On room air. Vital
signs stable. Admission assessment, history and education done, forms signed.
Pt complained of pain, due PRN pain medications given as prescribed. ER staff
said that pt had R chest peripheral IV, none noted during assessment- called
IV nurse to insert new IV on pt, SL at R FA. On blood sugar monitoring- taken
and recorded accordingly. On heart healthy diet- tolerating well; no nausea,
no vomiting and no abdominal pain noted. Falls bundle in place.
Dr Harris informed re: the following- pt with yeast infection under her
breasts, pt asking for additional pain meds aside from norco and pt
complaining of cough- only on regular breathing treatments.
With swelling on bilateral lower extremities, with blister at left foot- photo
to be taken.
To continue monitoring patient.

## 2020-02-21 VITALS — SYSTOLIC BLOOD PRESSURE: 143 MMHG | DIASTOLIC BLOOD PRESSURE: 60 MMHG

## 2020-02-21 VITALS — SYSTOLIC BLOOD PRESSURE: 115 MMHG | DIASTOLIC BLOOD PRESSURE: 48 MMHG

## 2020-02-21 LAB
ANION GAP SERPL CALC-SCNC: 6 MMOL/L (ref 7–16)
BASOPHILS NFR BLD AUTO: 0.4 % (ref 0–2)
BUN SERPL-MCNC: 22 MG/DL (ref 7–18)
CALCIUM SERPL-MCNC: 8.8 MG/DL (ref 8.5–10.1)
CHLORIDE SERPL-SCNC: 106 MMOL/L (ref 98–107)
CO2 SERPL-SCNC: 33 MMOL/L (ref 21–32)
CREAT SERPL-MCNC: 0.8 MG/DL (ref 0.6–1)
EOSINOPHIL NFR BLD: 0.9 % (ref 0–3)
ERYTHROCYTE [DISTWIDTH] IN BLOOD BY AUTOMATED COUNT: 17.5 % (ref 10.5–14.5)
GLUCOSE SERPL-MCNC: 117 MG/DL (ref 74–106)
GRANULOCYTES NFR BLD MANUAL: 69.9 % (ref 36–66)
HCT VFR BLD CALC: 27.7 % (ref 37–47)
HGB BLD-MCNC: 8.8 GM/DL (ref 12–15)
LYMPHOCYTES NFR BLD AUTO: 23.2 % (ref 24–44)
MAGNESIUM SERPL-MCNC: 1.6 MG/DL (ref 1.8–2.4)
MCH RBC QN AUTO: 29 PG (ref 26–34)
MCHC RBC AUTO-ENTMCNC: 31.7 G/DL (ref 28–37)
MCV RBC: 91.3 FL (ref 80–100)
MONOCYTES NFR BLD: 5.6 % (ref 1–8)
NEUTROPHILS # BLD: 5.4 THOU/UL (ref 1.4–8.2)
PLATELET # BLD: 155 THOU/UL (ref 150–400)
POTASSIUM SERPL-SCNC: 3.5 MMOL/L (ref 3.5–5.1)
RBC # BLD AUTO: 3.03 MIL/UL (ref 4.2–5)
SODIUM SERPL-SCNC: 145 MMOL/L (ref 136–145)
WBC # BLD AUTO: 7.7 THOU/UL (ref 4–11)

## 2020-02-21 NOTE — NUR
ASSUMED CARE OF PT AT 1900HRS. PT IS AOX4 AND LETS NEEDS BE KNOWN. FALL
PRECAUTION IN PLACE. [facility] REPORTED SOME PAIN AND PEN MEDS WERE GIVEN. HOME MEDS
NOT RESTARTED AND RNP WAS NOTIFED. PT WAS ASKED TO BRING IN HOME MED LIST
TOMORROW FOR CLEARIFICATION. PT WAS ABLE TO SLEEP PART OF THE SHIFT. VSS AND
NO S/S OF ACUTE DISTRESS. WILL CONTINUE TO MONITOR.

## 2020-02-21 NOTE — NUR
WOUND CONSULT;
THE RIGHT FOOT HAS A STABLE BLISTER APPROX. 5 X 5 CM WITH YELLOW TINGED
DRAINAGE.  THERE IS EDEMA  TO THE BILATERAL LE WHICH IS PITTING.  THE PATIENT
HAS A WET COUGH.  THE LE'S TEMPATURE SEEMS WNL.  THE PATIENT STATED SHE HAS
HAD CHF.
 
RECOMMENDATIONS;
1-COVER WITH A BORDER FOAM, CHANGE M/W/F PRN
2-WILL CONSULT DR MARY CHRISTOPHER
 
DISCUSSED WITH RN

## 2020-02-21 NOTE — NUR
PT ADMITTED RELATED TO ABD PAIN, BLE EDEMA, L FOOT WOUND. CM REVIEWED CHART
AND SPOKE WITH CARE TEAM. CM MET WITH PT AT BEDSIDE THIS DAY. PT IS A&O X4. CM
ROLE INTRODUCED. PT INDICATED SHE LIVES IN A HOUSE WITH HER SON AND DTR IN
[place]. PT INIDCATED SHE HAD USED A CANE, FWW, AND WHEELCHAIR TO ASSIST WITH
MOBILITY PTA. PT INDICATED NO HOME O2 OR FUNCTIONING NEBULIZER. PT INIDCATED
SHE WOULD LIKE HOME O2 AND NEB IF SHE CAN GET THEM. PT WAS CURRENT ON SERVICE
WITH [facility]. THEY WERE NOTIFIED OF PT'S ADMISSION. PT WOULD LIKE TO RETURN
HOME AND RESUME HH SERVICES UPON DC. CM TO FOLLOW AS INDICATED WITH DC
PLANNING.

## 2020-02-21 NOTE — NUR
Received awake on bed. On room air. Vital signs stable. A+Ox4. On heart
healthy diet- tolerating well; no nausea, no vomiting and no abdominal pain.
On blood sugar monitoring taken and recorded accordingly.
With SL at R FA. With LE edema- kept elevated. With blister noted
on her Left foot- photo taken, wound care consult made. Assisted in ADLs.
Falls bundle in place.
Complained of pain, due PRN pain medications given as prescribed.

## 2020-02-22 VITALS — DIASTOLIC BLOOD PRESSURE: 52 MMHG | SYSTOLIC BLOOD PRESSURE: 117 MMHG

## 2020-02-22 VITALS — SYSTOLIC BLOOD PRESSURE: 115 MMHG | DIASTOLIC BLOOD PRESSURE: 60 MMHG

## 2020-02-22 VITALS — DIASTOLIC BLOOD PRESSURE: 46 MMHG | SYSTOLIC BLOOD PRESSURE: 101 MMHG

## 2020-02-22 VITALS — SYSTOLIC BLOOD PRESSURE: 132 MMHG | DIASTOLIC BLOOD PRESSURE: 60 MMHG

## 2020-02-22 LAB
ANION GAP SERPL CALC-SCNC: 5 MMOL/L (ref 7–16)
BUN SERPL-MCNC: 28 MG/DL (ref 7–18)
CALCIUM SERPL-MCNC: 9.1 MG/DL (ref 8.5–10.1)
CHLORIDE SERPL-SCNC: 103 MMOL/L (ref 98–107)
CO2 SERPL-SCNC: 35 MMOL/L (ref 21–32)
CREAT SERPL-MCNC: 1.2 MG/DL (ref 0.6–1)
ERYTHROCYTE [DISTWIDTH] IN BLOOD BY AUTOMATED COUNT: 17.4 % (ref 10.5–14.5)
GLUCOSE SERPL-MCNC: 132 MG/DL (ref 74–106)
HCT VFR BLD CALC: 29.2 % (ref 37–47)
HGB BLD-MCNC: 9.3 GM/DL (ref 12–15)
MCH RBC QN AUTO: 29 PG (ref 26–34)
MCHC RBC AUTO-ENTMCNC: 31.9 G/DL (ref 28–37)
MCV RBC: 91 FL (ref 80–100)
PLATELET # BLD: 162 THOU/UL (ref 150–400)
POTASSIUM SERPL-SCNC: 3.8 MMOL/L (ref 3.5–5.1)
RBC # BLD AUTO: 3.21 MIL/UL (ref 4.2–5)
SODIUM SERPL-SCNC: 143 MMOL/L (ref 136–145)
WBC # BLD AUTO: 9.2 THOU/UL (ref 4–11)

## 2020-02-22 NOTE — NUR
ASSUMED CARE OF PATIENT AT 0715, PATIENT ALERT AND ORIENTED, BUT SLEEPY TODAY.
DR RUCKER AWARE OF DROWSINESS, NO NEW ORDERS RECEIVED. REPORTED TO DR RUCKER
THAT B/P DIASTOLIC IN 40'S, NO EW ORDERS RECEIVED. PT CAME TO EVALUATE THE
PATIENT, UNABLE TO GET PATIENT UP TO THE CHAIR. C/O PAIN, RECEIVED FENTANYL 50
MCG IV X 1 THIS SHIFT. KETAN. LEGS SWOLLEN AND KETAN ARMS. SHE RECEIVED LASIX 40MG
IV X 1 THIS SHIFT. O2 AT 2 LITERS/NC IN PLACE. WILL CONTINUE TO MONITOR.

## 2020-02-22 NOTE — HC
[facility]
[place]ir[place]   [address]                     CONSULTATION                  
_______________________________________________________________________________
 
Name:       TERENCE BUCIO              Room #:         406-P       [facility] [place]  
M.R.#:      [id number]                       Account #:      [id number]  
Admission:  02/20/20    Attend Phys:    Zi Ayala MD    
Discharge:              Date of Birth:  [birth year]  
                                                          Report #: [id number]
                                                                    [id number]   
_______________________________________________________________________________
THIS REPORT FOR:  
 
cc:  Anatoliy Wheat MD, Bernard O. MD Jetmore, Allen B. MD                                          ~
CC: Zi Wheat
 
DATE OF SERVICE:  02/22/2020
 
 
[facility] WOUND CARE CONSULTATION
 
REASON FOR CONSULTATION:  Lymphedema with blistering wound of the left foot.
 
HISTORY OF PRESENT ILLNESS:  The patient is a 75-year-old woman with history of
diastolic heart failure, pulmonary hypertension, non-insulin dependent diabetes
mellitus, COPD, admitted for persistent weakness with some shortness of breath. 
It is noted that she has chronic lymphedema and wounds of her left foot. 
Echocardiogram has shown trace-to-mild mitral and tricuspid regurgitation.  She
has some element of congestive heart failure and lymphedema of the lower
extremities.  Wound Care was consulted due to chronic lymphedema and note
blistering wounds of her left foot.
 
PAST MEDICAL HISTORY:  Diastolic heart failure, hypertension, pulmonary
hypertension, COPD, chronic lymphedema.
 
PAST SURGICAL HISTORY:  Lung surgery, thoracentesis, hysterectomy,
tonsillectomy, hemorrhoidectomy, lumbar spinal surgery with chronic pain,
pleurodesis for chronic pleural effusion.
 
SOCIAL HISTORY:  Tobacco use.  Former smoker.
 
REVIEW OF SYSTEMS:  Negative.
 
PHYSICAL EXAMINATION:
GENERAL:  Shows an obese, elderly woman, who is alert and conversant.
HEENT:  Mucous membranes are moist.
NECK:  Supple.
LUNGS:  Respirations are unlabored.
ABDOMEN:  Soft and obese.
EXTREMITIES:  Examination of the lower extremities shows edema of both lower
extremities with some pitting edema.  There are no wounds of the right lower
extremity.  Left lower extremity shows significant edema of the left foot with a
2 cm x 1 cm x 0.5 cm raised blistered containing clear serous fluid.  Dorsalis
pedis pulses present in the right foot.
 
 
 
[address]   [address]                     CONSULTATION                  
_______________________________________________________________________________
 
Name:       TERENCE BUCIO              Room #:         406-P       [facility] [place]  
M.R.#:      [id number]                       Account #:      [id number]  
Admission:  02/20/20    Attend Phys:    Zi Ayala MD    
Discharge:              Date of Birth:  [birth year]  
                                                          Report #: [id number]
                                                                    [id number]   
_______________________________________________________________________________
 
IMPRESSION:
1.  Congestive heart failure.
2.  Chronic obstructive pulmonary disease.
3.  Obesity.
4.  Urinary tract infection.
5.  Diabetes mellitus type 2.
6.  Acute on chronic lymphedema with particular swelling of the left leg.
 
PLAN:  Once congestive failure stabilize, the patient may be suitable for
compression of the lower extremities.  This could be done once the blister of
the left foot has ruptured.  For now, I would recommend noncompressive therapy
with Lasix diuresis and elevation, foam border on the blistered area of the left
foot and Wound Care team will follow.
 
 
 
 
 
 
 
 
 
 
 
 
 
 
 
 
 
 
 
 
 
 
 
 
 
 
 
 
 
 
  <ELECTRONICALLY SIGNED>
   By: Gustavo Trujillo MD          
  02/22/20     1151
D: 02/22/20 1038                           _____________________________________
T: 02/22/20 1057                           Gustavo Trujillo MD            /nt

## 2020-02-22 NOTE — NUR
ASSUMED CARE OF PT AT 1900HRS. PT IS AOX4 AND LETS NEEDS BE KNOWN. FALL
PRECAUTION IN PLACE. 2L O2 VIA NC CONTINUED. ABX TREATMENT CONTINUED. PT
REPORTED PAIN AND WAS TREATED WITH PRN PAIN MEDS. PT DENIED NAUSEA OR SOA. PT
WAS ABLE TO GET COMFORTABLE AND SLEEP PART OF THE SHIFT. VSS AND NO S/S OF
ACUTE DISTRESS. WILL CONTINUE TO MONITOR.

## 2020-02-23 VITALS — DIASTOLIC BLOOD PRESSURE: 50 MMHG | SYSTOLIC BLOOD PRESSURE: 104 MMHG

## 2020-02-23 VITALS — SYSTOLIC BLOOD PRESSURE: 103 MMHG | DIASTOLIC BLOOD PRESSURE: 47 MMHG

## 2020-02-23 VITALS — DIASTOLIC BLOOD PRESSURE: 54 MMHG | SYSTOLIC BLOOD PRESSURE: 121 MMHG

## 2020-02-23 LAB
ANION GAP SERPL CALC-SCNC: 5 MMOL/L (ref 7–16)
BUN SERPL-MCNC: 31 MG/DL (ref 7–18)
CALCIUM SERPL-MCNC: 8.7 MG/DL (ref 8.5–10.1)
CHLORIDE SERPL-SCNC: 103 MMOL/L (ref 98–107)
CO2 SERPL-SCNC: 35 MMOL/L (ref 21–32)
CREAT SERPL-MCNC: 1 MG/DL (ref 0.6–1)
ERYTHROCYTE [DISTWIDTH] IN BLOOD BY AUTOMATED COUNT: 16.7 % (ref 10.5–14.5)
GLUCOSE SERPL-MCNC: 128 MG/DL (ref 74–106)
HCT VFR BLD CALC: 28.5 % (ref 37–47)
HGB BLD-MCNC: 9.1 GM/DL (ref 12–15)
MCH RBC QN AUTO: 28.9 PG (ref 26–34)
MCHC RBC AUTO-ENTMCNC: 32 G/DL (ref 28–37)
MCV RBC: 90.2 FL (ref 80–100)
PLATELET # BLD: 156 THOU/UL (ref 150–400)
POTASSIUM SERPL-SCNC: 3.7 MMOL/L (ref 3.5–5.1)
RBC # BLD AUTO: 3.16 MIL/UL (ref 4.2–5)
SODIUM SERPL-SCNC: 143 MMOL/L (ref 136–145)
WBC # BLD AUTO: 10.4 THOU/UL (ref 4–11)

## 2020-02-23 NOTE — NUR
ASSUMED PATIENT CARE AT 0700. PATIENT WAS ABLE TO GET UP TO BSC WITH 1 ASSIST.
UNFORTUMATELY THE PATIENT DID HAVE A URINARY ACCIDENT AND WAS NOT ABLE TO GET
UP IN TIME, BED WAS CHANGED AND PATIENT CLEANED UP. PATIENT DID C/O PAIN IN
HER LEFT KNEE AND LOWER BACK, PRN PAIN MEDS WERE GIVEN REGULARLY. PATIENT WAS
TAKEN OFF THE O2 PER THE DOCTOR BUT WITHIN THE HOUR 02 LEVELS WERE DOWN TO 86%
SO O2 WAS PUT BACK ON AT 1L. PATIENT IS TOLERATING THAT WELL. FALL PRECAUTIONS
ARE IN PLACE AND CALL LIGHT WITHIN REACH. PATIENT WILL CALL FOR HELP WHEN
NEEDED.

## 2020-02-23 NOTE — NUR
PATIENT DROWSY AT BEGINNING OF SHIFT, HOWEVER, AWAKENED AND CONVERSATION IS
APPROPRIATE. BLOOD SUGAR MONITORED PER ORDER AND NO INSULIN REQUIRED. C/O
PAIN TO HER KNEE AND BACK. MEDICATED WITH FENTANYL IVP AND TOPICAL OINTMENT TO
BOTH KNEES WITH RELIEF ENOUGH TO FALL ASLEEP. RESTING QUIETLY AT TIME OF NOTE.
AM NURSE STATED THAT PT AND NURSE WERE UNABLE TO GET PATIENT OOB AS SHE IS TOO
WEAK AND CAN'T BEAR HER WEIGHT TO THE BSC. WILL MONITOR.

## 2020-02-24 VITALS — DIASTOLIC BLOOD PRESSURE: 37 MMHG | SYSTOLIC BLOOD PRESSURE: 96 MMHG

## 2020-02-24 VITALS — SYSTOLIC BLOOD PRESSURE: 121 MMHG | DIASTOLIC BLOOD PRESSURE: 57 MMHG

## 2020-02-24 VITALS — DIASTOLIC BLOOD PRESSURE: 33 MMHG | SYSTOLIC BLOOD PRESSURE: 100 MMHG

## 2020-02-24 LAB
ANION GAP SERPL CALC-SCNC: 3 MMOL/L (ref 7–16)
BUN SERPL-MCNC: 38 MG/DL (ref 7–18)
CALCIUM SERPL-MCNC: 9.2 MG/DL (ref 8.5–10.1)
CHLORIDE SERPL-SCNC: 103 MMOL/L (ref 98–107)
CO2 SERPL-SCNC: 36 MMOL/L (ref 21–32)
CREAT SERPL-MCNC: 1.2 MG/DL (ref 0.6–1)
ERYTHROCYTE [DISTWIDTH] IN BLOOD BY AUTOMATED COUNT: 17.3 % (ref 10.5–14.5)
GLUCOSE SERPL-MCNC: 126 MG/DL (ref 74–106)
HCT VFR BLD CALC: 29.1 % (ref 37–47)
HGB BLD-MCNC: 9.1 GM/DL (ref 12–15)
MCH RBC QN AUTO: 28.7 PG (ref 26–34)
MCHC RBC AUTO-ENTMCNC: 31.3 G/DL (ref 28–37)
MCV RBC: 91.5 FL (ref 80–100)
PLATELET # BLD: 155 THOU/UL (ref 150–400)
POTASSIUM SERPL-SCNC: 3.8 MMOL/L (ref 3.5–5.1)
RBC # BLD AUTO: 3.18 MIL/UL (ref 4.2–5)
SODIUM SERPL-SCNC: 142 MMOL/L (ref 136–145)
WBC # BLD AUTO: 11.1 THOU/UL (ref 4–11)

## 2020-02-24 NOTE — NUR
SW reviewed chart and spoke with nursing. Pt was transferred to [facility]
from [facility]. Pt is slowly progressing towards goals for discharge. Therapy to work
with pt and make recommendation for discharge disposition. Pt was recently at
[facility] and went home with Janey CASTANO. Pt's PCP
is Dr. Anatoliy Wheat. SW is following to assist as needed with discharge
planning.

## 2020-02-24 NOTE — NUR
PATIENT ALERT AND ORIENTED X4. SLEEPING WELL THROUGHOUT THE NIGHT. NO C/O
PAIN. DAUGHTER HERE AT BEGINNING OF SHIFT VISITING. 02NC WORN THROUGHOUT THE
NIGHT SO AS NOT TO DESAT. BLOOD SUGAR MONITORED PER ORDER. PATIENT COOPERATIVE
WITH CARE. WILL MONITOR.

## 2020-02-24 NOTE — NUR
ASSUMED PATIENT CARE AT 0700. VSS ON 2L OF OXYGEN. ALERT AND ORIENTED X4, ABLE
TO VOICE HER NEEDS.  PATIENT WAS ABLE TO GET UP TO BSC WITH 1 ASSIST. HAD ONE
INCONT THIS AM.  PATIENT C/O PAIN IN LOWER BACK, 8/10 PRN PAIN MEDS WERE GIVEN
FREQUENTLY SEE [facility]. PT HAS HX OF LUMBAR SURGERY. VOLTAREN GEL GIVEN AS
SCHEDULE. OFFERED SUPPORTIVE CARE PATIENT TALKED TO DR. RUCKER THAT SHE WOULD
LIKE TO HAVE O2 WHEN SHE GOES HOME.  ENCOURAGED PT TO BE UP. SHE HAS BEEN UP
TO DAY ROOM FOR LUNCH AND UP TO RECLINER MOST OF  THE TIME TODAY AND USES BSC.
REASSESSMENT PER CHART. HAD FORMED BM. ENCOURAGED PT TO TAKE COLACE OR DRINK
PRUNE JUICE SINCE PT ON LOT OF PAIN MEDS. CONTINUE TO BE ON LASIX FOR
SWELLING. HAS ULCER ON LEFT FOOT. NO TREATMENT AT THIS MOMENT. WOUND CONSULT
CALLED. THEY CAME AND GAVE ORDER FOR US ARTERIAL LOWER EXTRE BILATERAL
TOMORROW.  FALL PRECAUTIONS ARE IN PLACE AND CALL LIGHT WITHIN REACH. PATIENT
WILL CALL FOR HELP WHEN NEEDED. PT SMILING AND SAID SHE HAS A GOOD DAY AND
GOOD CARE TODAY. GAVE REPORT TO NIGHT NURSE TO CONTINUE TO JEN.

## 2020-02-24 NOTE — NUR
Pt admit with abd pain, BLE edema, L foot wound, persistent weakness. This
admit is fifth in 2 months. Hx pulmonary edema, Afib, PNA, COPD, HLD, DM.
Nutrition consulted for wound status. Wound care indicated a stable blister on
Pt's left foot. Pt experiencing severe swelling over the past month with BLE
3-4+ pitting edema. Wt up 15# since 1/15/20, highest at 249# on 2/22 and now
237#. Lasix noted. Pt states UBW is around 218#. Pt's dtr in law prepares
meals at home, reports not using salt in cooking, and Pt eats protein foods.
Reports good appetite over admit with % intake. Voiced understanding of
importance of protein foods for wound healing. Labs include -153 past 2
days. Meds include ss insulin, IV Abx, reglan, senna, lasix. Considering good
appetite and intake of protein foods and stable wound status, low nutrition
risk at this time.

## 2020-02-25 VITALS — SYSTOLIC BLOOD PRESSURE: 113 MMHG | DIASTOLIC BLOOD PRESSURE: 55 MMHG

## 2020-02-25 VITALS — DIASTOLIC BLOOD PRESSURE: 63 MMHG | SYSTOLIC BLOOD PRESSURE: 141 MMHG

## 2020-02-25 VITALS — DIASTOLIC BLOOD PRESSURE: 67 MMHG | SYSTOLIC BLOOD PRESSURE: 124 MMHG

## 2020-02-25 VITALS — SYSTOLIC BLOOD PRESSURE: 121 MMHG | DIASTOLIC BLOOD PRESSURE: 48 MMHG

## 2020-02-25 LAB
ANION GAP SERPL CALC-SCNC: 5 MMOL/L (ref 7–16)
BUN SERPL-MCNC: 41 MG/DL (ref 7–18)
CALCIUM SERPL-MCNC: 8.5 MG/DL (ref 8.5–10.1)
CHLORIDE SERPL-SCNC: 100 MMOL/L (ref 98–107)
CO2 SERPL-SCNC: 35 MMOL/L (ref 21–32)
CREAT SERPL-MCNC: 1.1 MG/DL (ref 0.6–1)
GLUCOSE SERPL-MCNC: 115 MG/DL (ref 74–106)
POTASSIUM SERPL-SCNC: 3.9 MMOL/L (ref 3.5–5.1)
SODIUM SERPL-SCNC: 140 MMOL/L (ref 136–145)

## 2020-02-25 NOTE — NUR
Assumed pt care at 1900. A/OX4, Up with AX1 RW/GB. C/o left knee pain,Voltaren
gel applied. Pt's BP low at HS 94/40 wanted Fentanyl IV educated on risks of
IV pain meds with low BP and offered [place] but pt declined need for pain meds
then, pt encouraged to increase PO intake as well since she's on iv Lasix
verbalized understanding. Pt's BP rechecked and up to 121/48. Resting quietly
with oxygen in place. Incontinent of bladder at times,moisture barrier
applied PRN. IV patent on RFA and saline locked. Fall precautions in place, pt
calls approp. Pt scheduled to have an ultra sound on left foot today. Will
continue to monitor pt.

## 2020-02-25 NOTE — NUR
ASSUMED CARE OF PATIENT AT 0715, PATIENT ALERT AND ORIENTED. UP WITH ASSIST X
2. PATIENT C/O PAIN THIS SHIFT, SHE RECEIVED HYDROCODONE 1 TABLET X 2, AND
FENTANYL 50 MCG IV X 1. PATIENT AMBULATED WITH PHYSICAL THERAPY, AND AFTER
THEY GOT TO THE PATIENT'S ROOM HER LEFT LEG GAVE OUT, PATIENT WAS  LOWERED TO
THE FLOOR BY PHYSICAL THERAPY, ASSISTED UP X 4 TO THE RECLINER. PATIENT THEN
C/O PAIN WITH RIGHT FOOT, THIS RN NOTIFIED DUANE/NP RECEIVED ORDER FOR RIGHT
FOOT X-RAY, NO ACUTE FINDINGS, SWELLING TO KETAN. LWR EXT. AND KETAN. UPPR EXT.
PATIENT RECEIVED LASIX 40MG IV X 1 THIS SHIFT SCHEDULED. PATIENT HAS RIGHT AC
IV IN PLACE. PATIENT INCONT. OF URINE THIS SHIFT. IR/DR HARRELL CONSULTED, HE
SAW THE PATIENT NPO AFTER MIDNIGHT FOR ARTERIOGRAM. WILL CONTINUE TO MONITOR.

## 2020-02-25 NOTE — NUR
Discharge planning. Post Acute recommended at discharge. Patient referral
faxed to [facility] Liaison, per patient request. Call placed to
[facility] to notify of patient referral. [facility] to review and notify. Following.

## 2020-02-25 NOTE — NUR
SW reviewed chart and spoke with nursing and attending physician.
Recommendation made for pt to go to a post-acute facility. SW met with pt at
bedside to discuss discharge plan. Pt states that she would prefer to go back
to [facility]. SW explained that she will need to be evaluate
to see if she meets admission criteria. Pt verbalized understanding. Pt states
if she is able to go home, she would like to resume HH services through
[facility]  and then may need home O2. Pt would like to use [facility] for home
O2. Discharge planner to fax referral to [facility] tomorrow. SW notified [facility]
liaison to request evaluation tomorrow. [facility] is following to assist as needed
with discharge planning.

## 2020-02-26 VITALS — SYSTOLIC BLOOD PRESSURE: 146 MMHG | DIASTOLIC BLOOD PRESSURE: 56 MMHG

## 2020-02-26 VITALS — SYSTOLIC BLOOD PRESSURE: 128 MMHG | DIASTOLIC BLOOD PRESSURE: 54 MMHG

## 2020-02-26 VITALS — DIASTOLIC BLOOD PRESSURE: 85 MMHG | SYSTOLIC BLOOD PRESSURE: 111 MMHG

## 2020-02-26 VITALS — SYSTOLIC BLOOD PRESSURE: 110 MMHG | DIASTOLIC BLOOD PRESSURE: 45 MMHG

## 2020-02-26 VITALS — SYSTOLIC BLOOD PRESSURE: 88 MMHG | DIASTOLIC BLOOD PRESSURE: 65 MMHG

## 2020-02-26 VITALS — SYSTOLIC BLOOD PRESSURE: 180 MMHG | DIASTOLIC BLOOD PRESSURE: 67 MMHG

## 2020-02-26 LAB
ANION GAP SERPL CALC-SCNC: 5 MMOL/L (ref 7–16)
BUN SERPL-MCNC: 37 MG/DL (ref 7–18)
CALCIUM SERPL-MCNC: 8.8 MG/DL (ref 8.5–10.1)
CHLORIDE SERPL-SCNC: 100 MMOL/L (ref 98–107)
CO2 SERPL-SCNC: 34 MMOL/L (ref 21–32)
CREAT SERPL-MCNC: 1 MG/DL (ref 0.6–1)
GLUCOSE SERPL-MCNC: 80 MG/DL (ref 74–106)
POTASSIUM SERPL-SCNC: 4 MMOL/L (ref 3.5–5.1)
SODIUM SERPL-SCNC: 139 MMOL/L (ref 136–145)

## 2020-02-26 PROCEDURE — B4181ZZ FLUOROSCOPY OF BILATERAL RENAL ARTERIES USING LOW OSMOLAR CONTRAST: ICD-10-PCS | Performed by: HOSPITALIST

## 2020-02-26 PROCEDURE — B41D1ZZ FLUOROSCOPY OF AORTA AND BILATERAL LOWER EXTREMITY ARTERIES USING LOW OSMOLAR CONTRAST: ICD-10-PCS | Performed by: HOSPITALIST

## 2020-02-26 NOTE — NUR
[facility] evaluated today and they can accept the pt. They will have a bed tomorrow
if medically ready. Pt had procedure in IR today and being transfered to CCU.
Will f/u with all parties tomorrow.

## 2020-02-26 NOTE — NUR
ASSUMED CARE AT 0700. PT AOX4, VSS, PAIN IN BLE IS CONTROLLED WITH ORAL
ANALGESIC. PT REMAINED NPO FOR ARTERIOGRAM PROCEDURE. PT HAS 3+ EDEMA IN BLE.
PT ELEVATED BLE IN RECLINER WHILE HERE ON THE UNIT. PT WAS TRANSFERED TO IR
SPECIALS AT 1410. PT WILL TRANSFER TO CCU PER DOCTOR ORDER.

## 2020-02-26 NOTE — NUR
PT ARRIVED TO UNIT APPROX 1730 POST RUNOFF. RIGHT GROIN SITE C/D/I. VSS. PT
DENIES PAIN AND SOA. 4N NURSE TO CALL REPORT TIMELY. IR NURSE REPORTS PT BEING
FORGETFUL SINCE GIVEN PROCEDURE SEDATIVES. WILL MONITOR PT FOR RETURN TO NEURO
BASELINE. NO DISTRESS NOTED.

## 2020-02-26 NOTE — NUR
Assumed pt care at 1900. Pt is A/OX4,VSS. C/o pain to right foot/left knee
9/10 medicated with Fentanyl per request with relief reported. Pt is up with
max assist, with GB/RW from recliner to bed BLE still weak and unsteady gait
noted. 3+ pitting edema on philip feet,encouraged to keep feet elevated. Dsg
intact on left dorsal foot. Pt has been NPO from midnight for an arteriogram
to LLE. Fall precautions in place, pt resting quietly at this time w/o
distress noted.Oxygen on at 1L/NC. Will continue to monitor pt.

## 2020-02-27 VITALS — SYSTOLIC BLOOD PRESSURE: 94 MMHG | DIASTOLIC BLOOD PRESSURE: 40 MMHG

## 2020-02-27 VITALS — DIASTOLIC BLOOD PRESSURE: 41 MMHG | SYSTOLIC BLOOD PRESSURE: 91 MMHG

## 2020-02-27 VITALS — SYSTOLIC BLOOD PRESSURE: 93 MMHG | DIASTOLIC BLOOD PRESSURE: 44 MMHG

## 2020-02-27 VITALS — SYSTOLIC BLOOD PRESSURE: 87 MMHG | DIASTOLIC BLOOD PRESSURE: 35 MMHG

## 2020-02-27 VITALS — SYSTOLIC BLOOD PRESSURE: 121 MMHG | DIASTOLIC BLOOD PRESSURE: 41 MMHG

## 2020-02-27 VITALS — SYSTOLIC BLOOD PRESSURE: 110 MMHG | DIASTOLIC BLOOD PRESSURE: 48 MMHG

## 2020-02-27 LAB
ANION GAP SERPL CALC-SCNC: 6 MMOL/L (ref 7–16)
BUN SERPL-MCNC: 26 MG/DL (ref 7–18)
CALCIUM SERPL-MCNC: 8.9 MG/DL (ref 8.5–10.1)
CHLORIDE SERPL-SCNC: 103 MMOL/L (ref 98–107)
CO2 SERPL-SCNC: 33 MMOL/L (ref 21–32)
CREAT SERPL-MCNC: 0.8 MG/DL (ref 0.6–1)
ERYTHROCYTE [DISTWIDTH] IN BLOOD BY AUTOMATED COUNT: 17.4 % (ref 10.5–14.5)
GLUCOSE SERPL-MCNC: 102 MG/DL (ref 74–106)
HCT VFR BLD CALC: 26.4 % (ref 37–47)
HGB BLD-MCNC: 8.6 GM/DL (ref 12–15)
MCH RBC QN AUTO: 29.4 PG (ref 26–34)
MCHC RBC AUTO-ENTMCNC: 32.5 G/DL (ref 28–37)
MCV RBC: 90.6 FL (ref 80–100)
PLATELET # BLD: 150 THOU/UL (ref 150–400)
POTASSIUM SERPL-SCNC: 3.8 MMOL/L (ref 3.5–5.1)
RBC # BLD AUTO: 2.91 MIL/UL (ref 4.2–5)
SODIUM SERPL-SCNC: 142 MMOL/L (ref 136–145)
WBC # BLD AUTO: 10.2 THOU/UL (ref 4–11)

## 2020-02-27 NOTE — NUR
[facility] updated. No dc today due to low bp and edema. They will have a bed
tomorrow and check in on her in the am.

## 2020-02-27 NOTE — NUR
AAOX3; FORGETFUL. SR PER TELE. FEBRILE, BP 87/35. DR. RUCKER CALLED, INFORMED.
ASSISTED TO BSC WITH TWO PERSONS AND GAIT BELT FOR LARGE BM. VERY WEAK. DOES
NOT TOLERATE MUCH ACTIVITY. GOOD URINE OUTPUT VIA FEMALE CATH. FENTANYL FOR
LUMBAR PAIN. REPOSITIONED OFTEN. GROSSLY EDEMATOUS BUT HYPOTENSION PREVENTS IV
LASIX. WILL CONTINUE TO FOLLOW CLOSELY.

## 2020-02-27 NOTE — NUR
PATIENT ASSESSED AND HAS NO CHANGE. RIGHT GROIN CLEAR OF HEMATOMA, AND
BRUISING. DENIES ANY PAIN OR NEEDS.

## 2020-02-27 NOTE — NUR
ASSSUMMED CARE FROM [facility]. PATIENT TUNED FOR COMFORT. PERIPHEAL PULSES
ASSESSMENT DONE. DENIES ANY NEEDS. TELEO SHOWS NSR. TAKING HEART HEALTHY DIET.
LUNGS CTA. 02 2LNC. RIGHT GROIN CLEAR OF ANY HEMATOMA. PP GOOD. BEDREST. CONT
PLAN OF CARE. PERWICH CATH PATENT.  IV SITE HEALTHY WITH FLUYIDS INFUSING
WELL. BP GOOD. LEFT FOOT BLISTER COVERED WITH BORDER DRESSING. NO BRUISING ON
RIGHT GROIN AREA.

## 2020-02-28 VITALS — SYSTOLIC BLOOD PRESSURE: 112 MMHG | DIASTOLIC BLOOD PRESSURE: 44 MMHG

## 2020-02-28 VITALS — SYSTOLIC BLOOD PRESSURE: 115 MMHG | DIASTOLIC BLOOD PRESSURE: 42 MMHG

## 2020-02-28 VITALS — SYSTOLIC BLOOD PRESSURE: 119 MMHG | DIASTOLIC BLOOD PRESSURE: 40 MMHG

## 2020-02-28 VITALS — SYSTOLIC BLOOD PRESSURE: 127 MMHG | DIASTOLIC BLOOD PRESSURE: 43 MMHG

## 2020-02-28 VITALS — DIASTOLIC BLOOD PRESSURE: 49 MMHG | SYSTOLIC BLOOD PRESSURE: 123 MMHG

## 2020-02-28 LAB
BILIRUB UR-MCNC: NEGATIVE MG/DL
COLOR UR: YELLOW
ERYTHROCYTE [DISTWIDTH] IN BLOOD BY AUTOMATED COUNT: 17.9 % (ref 10.5–14.5)
GRANULOCYTES NFR BLD MANUAL: 67 % (ref 36–66)
HCT VFR BLD CALC: 27 % (ref 37–47)
HGB BLD-MCNC: 8.5 GM/DL (ref 12–15)
KETONES UR STRIP-MCNC: NEGATIVE MG/DL
LYMPHOCYTES NFR BLD AUTO: 20 % (ref 24–44)
MCH RBC QN AUTO: 29.4 PG (ref 26–34)
MCHC RBC AUTO-ENTMCNC: 31.6 G/DL (ref 28–37)
MCV RBC: 93.3 FL (ref 80–100)
METAMYELOCYTES NFR BLD: 1 %
MONOCYTES NFR BLD: 5 % (ref 1–8)
NEUTROPHILS # BLD: 7.3 THOU/UL (ref 1.4–8.2)
NEUTS BAND NFR BLD: 7 % (ref 0–8)
PLATELET # BLD EST: NORMAL 10*3/UL
PLATELET # BLD: 162 THOU/UL (ref 150–400)
RBC # BLD AUTO: 2.9 MIL/UL (ref 4.2–5)
RBC # UR STRIP: NEGATIVE /UL
RBC MORPH BLD: NORMAL
SP GR UR STRIP: 1.02 (ref 1–1.03)
URINE CLARITY: CLEAR
URINE GLUCOSE-RANDOM*: NEGATIVE
URINE LEUKOCYTES-REFLEX: NEGATIVE
URINE NITRITE-REFLEX: NEGATIVE
URINE PROTEIN (DIPSTICK): NEGATIVE
UROBILINOGEN UR STRIP-ACNC: 0.2 E.U./DL (ref 0.2–1)
WBC # BLD AUTO: 9.8 THOU/UL (ref 4–11)

## 2020-02-28 NOTE — NUR
ASSUMED CARE AT 0700, SHIFT ASSESSMENT DONE, MEDS GIVEN, VSS. REPORTED PAIN,
PRN PAIN MEDS GIVEN WITH SOME RELIED. WOUND CARE DONE. HAS A EXTERNAL FEMALE
CATHETER, WORKING WELL FOR PT. ON 2L NASAL CANULA. [facility]. WILL CONTINUE TO ASSESS AND ASSIST
WITH ADLs AS NEEDED.

## 2020-02-28 NOTE — NUR
[facility] updated. Pt being treated for pneumonia. Possible sat/sun dc to rehab
pending her progress. [facility] indicates they will have a bed for her if ready
over the weekend. Weekend staff to call their oncall liason Alice at
[phone] to arrange. Son will need to be updated at dc. Chart copy to be
sent with the pt and nursing would need to call report.

## 2020-02-28 NOTE — NUR
ASSUMED PT CARE AT 1900, PT IS ALERT AND ORIENTEDX4 MAKES NEEDS KNOWN, DENIES
CHEST PAIN AND SOB, PT IS ON 2L OF 02 AND TOLERATING WELL, COMPLAINED OF LOWER
BACK PAIN, PAIN MEDICATION GIVEN, ASESSMENTS AS CHARTED, SR,1 DEGREE AVB, U/A
COLLECTED,BP WAS LOW, HELD LISINOPRIL, BP BETTER THIS AM, DRESSING TO THE
RIGHT GRON CLEAN, DRY AND INTACT, RESTED WELL

## 2020-02-29 VITALS — DIASTOLIC BLOOD PRESSURE: 64 MMHG | SYSTOLIC BLOOD PRESSURE: 152 MMHG

## 2020-02-29 VITALS — SYSTOLIC BLOOD PRESSURE: 142 MMHG | DIASTOLIC BLOOD PRESSURE: 51 MMHG

## 2020-02-29 VITALS — DIASTOLIC BLOOD PRESSURE: 48 MMHG | SYSTOLIC BLOOD PRESSURE: 131 MMHG

## 2020-02-29 LAB
ANION GAP SERPL CALC-SCNC: 6 MMOL/L (ref 7–16)
BUN SERPL-MCNC: 21 MG/DL (ref 7–18)
CALCIUM SERPL-MCNC: 9.1 MG/DL (ref 8.5–10.1)
CHLORIDE SERPL-SCNC: 102 MMOL/L (ref 98–107)
CO2 SERPL-SCNC: 35 MMOL/L (ref 21–32)
CREAT SERPL-MCNC: 1 MG/DL (ref 0.6–1)
ERYTHROCYTE [DISTWIDTH] IN BLOOD BY AUTOMATED COUNT: 17.4 % (ref 10.5–14.5)
GLUCOSE SERPL-MCNC: 120 MG/DL (ref 74–106)
HCT VFR BLD CALC: 27.6 % (ref 37–47)
HGB BLD-MCNC: 8.8 GM/DL (ref 12–15)
MCH RBC QN AUTO: 29.3 PG (ref 26–34)
MCHC RBC AUTO-ENTMCNC: 31.9 G/DL (ref 28–37)
MCV RBC: 91.8 FL (ref 80–100)
PLATELET # BLD: 145 THOU/UL (ref 150–400)
POTASSIUM SERPL-SCNC: 3.5 MMOL/L (ref 3.5–5.1)
RBC # BLD AUTO: 3.01 MIL/UL (ref 4.2–5)
SODIUM SERPL-SCNC: 143 MMOL/L (ref 136–145)
WBC # BLD AUTO: 9.5 THOU/UL (ref 4–11)

## 2020-02-29 NOTE — NUR
Assumed pt care this am vs stable, on 2L of O2 vs NC. External FC in place,
draining yellow urine, pt is on furosemide. IV removed since this has been
leaking, replaced by IV team on the right forearm. Pain is managed with
medications, partial relief noted. Pt is obese and refuses to get up and try
to ambulate. Generalized edema is noted and is prominent on the lower
extremities, wound care done on the both feet. Dressing on the right groin
area c/d/i.  POC followed with no signs of distress noted.

## 2020-02-29 NOTE — NUR
progress
 
pt transferred from [facility] report from Layne HINES. pt transferred via bed a/o
x4. external female cath in place, vss, wounds to bilateral knees covered with
drsgs c/d/i. taking fentanyl and hydrocodone for pain with effect. 2 liters o2
via nasal cannula. slept most of night ivf's and antibiotics administered as
ordered.

## 2020-03-01 VITALS — DIASTOLIC BLOOD PRESSURE: 51 MMHG | SYSTOLIC BLOOD PRESSURE: 124 MMHG

## 2020-03-01 VITALS — DIASTOLIC BLOOD PRESSURE: 51 MMHG | SYSTOLIC BLOOD PRESSURE: 125 MMHG

## 2020-03-01 LAB
ANION GAP SERPL CALC-SCNC: 4 MMOL/L (ref 7–16)
BUN SERPL-MCNC: 16 MG/DL (ref 7–18)
CALCIUM SERPL-MCNC: 8.8 MG/DL (ref 8.5–10.1)
CHLORIDE SERPL-SCNC: 101 MMOL/L (ref 98–107)
CO2 SERPL-SCNC: 36 MMOL/L (ref 21–32)
CREAT SERPL-MCNC: 0.9 MG/DL (ref 0.6–1)
ERYTHROCYTE [DISTWIDTH] IN BLOOD BY AUTOMATED COUNT: 16.6 % (ref 10.5–14.5)
GLUCOSE SERPL-MCNC: 116 MG/DL (ref 74–106)
HCT VFR BLD CALC: 28.9 % (ref 37–47)
HGB BLD-MCNC: 9.2 GM/DL (ref 12–15)
MCH RBC QN AUTO: 29 PG (ref 26–34)
MCHC RBC AUTO-ENTMCNC: 31.8 G/DL (ref 28–37)
MCV RBC: 91.2 FL (ref 80–100)
PLATELET # BLD: 165 THOU/UL (ref 150–400)
POTASSIUM SERPL-SCNC: 3.6 MMOL/L (ref 3.5–5.1)
RBC # BLD AUTO: 3.17 MIL/UL (ref 4.2–5)
SODIUM SERPL-SCNC: 141 MMOL/L (ref 136–145)
WBC # BLD AUTO: 9.6 THOU/UL (ref 4–11)

## 2020-03-01 NOTE — NUR
progress
 
pt a/o x4 in bed vss, ivf's infusing as ordered, iv antibiotics given as
ordered. external female cath in place drained 600 cc's. able to reposition
self but refuses turns and off loading with pillows d/t hx of chronic back
pain. taking fentanyl and hydrocodone for pain with good results, lyrica added
last night and really increased her pain control, gabapentin dc'd as pt no
longer takes. to work with pt/ot as ordered.

## 2020-03-01 NOTE — NUR
Assumed patient care at 0715. Vital signs have been stable. AM blood sugar
required no sliding scale Insulin. Blood sugar at lunch required 3
units; none required at dinner. She continues to wear O2 at 2 Liters per nasal
Cannula. Patient has an external female catheter with good output of light
yellow urine. IV fluids discontinued as ordered this am. She is up with max
assist x's 2. Fentanyl 25mg given x's 1 this shift, Hydrocodone 5/325mg given
times one as well (for middle back pain and right foot pain; effective).
Report given to on-coming RN.

## 2020-03-02 VITALS — DIASTOLIC BLOOD PRESSURE: 52 MMHG | SYSTOLIC BLOOD PRESSURE: 129 MMHG

## 2020-03-02 NOTE — NUR
ASSUMED CAER OF PT AT 1900HRS. PT IS AOX3-4 AND LETS NEEDS BE KNOWN. FALL
PRECAUTION IN PLCE. O2 AT 1L FOR HS. PT REPORTED SOME PAIN AND WAS TREATED
WITH PRN PAIN MEDS. PT WAS ABLE TO GET COMFORTABLE AND SLEEP PART OF THE
SHIFT. VSS AND NO S/S OF ACUTE SITRESS WILL CONTINUE TO MONITOR.

## 2020-03-02 NOTE — NUR
Assumed patient care at 0715.Vital signs stable. She continues to have no
adverse reactions related to her IV Antibiotic Therapy. Accuchecks have not
required Sliding Scale Insulin support. Patient was Discharged to [facility]
Rehabilitation per wheelchair and [facility] Transportation. Patient
left with all of her belongings and Discharge Paperwork at 1620. Pictures were
taken of wounds on BLLE. Report given to FLORA Jensen at 1640.

## 2020-03-02 NOTE — NUR
Pt seen for nutrition follow up. Wound on left dorsum foot ruptured 2/27.
Management per wound care. Planned to D/C to [facility] last week but
condition was unstable. Had fever 38.1 on 2/27. Remains edematous. Intake was
% with an average of 76% of meals Mon-Fri. Intake declined over the
weekend to 5-50%. Weight remains stable at 247#. Pt c/o getting tired of
hospital food. Requested Ensure Enlive [place] BID. Obtained preferences
and made changes to lunch today. Meds: IV Abx, senna. Last BM 2/27. BG 
past 3 days. Controlled with SS insulin as needed. With poor intake only
reported past 2 days, preferences obtained and supplement ordered, Pt remains
low nutritional risk at this time.

## 2020-03-22 ENCOUNTER — HOSPITAL ENCOUNTER (EMERGENCY)
Dept: HOSPITAL 35 - ER | Age: 75
Discharge: HOME | End: 2020-03-22
Payer: COMMERCIAL

## 2020-03-22 VITALS — HEIGHT: 63 IN | BODY MASS INDEX: 38.98 KG/M2 | WEIGHT: 220 LBS

## 2020-03-22 DIAGNOSIS — Z79.899: ICD-10-CM

## 2020-03-22 DIAGNOSIS — I48.0: ICD-10-CM
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
## 2020-03-22 DIAGNOSIS — I10: ICD-10-CM

## 2020-03-22 DIAGNOSIS — Z87.01: ICD-10-CM

## 2020-03-22 DIAGNOSIS — J44.9: ICD-10-CM
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
## 2020-03-22 DIAGNOSIS — Z86.73: ICD-10-CM

## 2020-03-22 DIAGNOSIS — E11.9: ICD-10-CM

## 2020-03-22 DIAGNOSIS — Z87.891: ICD-10-CM

## 2020-03-22 DIAGNOSIS — E78.00: ICD-10-CM

## 2020-03-22 DIAGNOSIS — Z98.890: ICD-10-CM
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
## 2020-03-22 DIAGNOSIS — M79.7: ICD-10-CM

## 2020-03-22 DIAGNOSIS — M54.6: Primary | ICD-10-CM

## 2020-03-22 LAB
ALBUMIN SERPL-MCNC: 3 G/DL (ref 3.4–5)
ALT SERPL-CCNC: 14 U/L (ref 30–65)
ANION GAP SERPL CALC-SCNC: 6 MMOL/L (ref 7–16)
AST SERPL-CCNC: 24 U/L (ref 15–37)
BASOPHILS NFR BLD AUTO: 0.6 % (ref 0–2)
BILIRUB SERPL-MCNC: 0.6 MG/DL
BUN SERPL-MCNC: 21 MG/DL (ref 7–18)
CALCIUM SERPL-MCNC: 9.6 MG/DL (ref 8.5–10.1)
CHLORIDE SERPL-SCNC: 101 MMOL/L (ref 98–107)
CO2 SERPL-SCNC: 34 MMOL/L (ref 21–32)
CREAT SERPL-MCNC: 0.9 MG/DL (ref 0.6–1)
EOSINOPHIL NFR BLD: 4.6 % (ref 0–3)
ERYTHROCYTE [DISTWIDTH] IN BLOOD BY AUTOMATED COUNT: 15.2 % (ref 10.5–14.5)
GLUCOSE SERPL-MCNC: 142 MG/DL (ref 74–106)
GRANULOCYTES NFR BLD MANUAL: 65.1 % (ref 36–66)
HCT VFR BLD CALC: 33.4 % (ref 37–47)
HGB BLD-MCNC: 10.8 GM/DL (ref 12–15)
LYMPHOCYTES NFR BLD AUTO: 20.9 % (ref 24–44)
MCH RBC QN AUTO: 28.7 PG (ref 26–34)
MCHC RBC AUTO-ENTMCNC: 32.5 G/DL (ref 28–37)
MCV RBC: 88.4 FL (ref 80–100)
MONOCYTES NFR BLD: 8.8 % (ref 1–8)
NEUTROPHILS # BLD: 4 THOU/UL (ref 1.4–8.2)
PLATELET # BLD: 165 THOU/UL (ref 150–400)
POTASSIUM SERPL-SCNC: 3.4 MMOL/L (ref 3.5–5.1)
PROT SERPL-MCNC: 6.5 G/DL (ref 6.4–8.2)
RBC # BLD AUTO: 3.77 MIL/UL (ref 4.2–5)
SODIUM SERPL-SCNC: 141 MMOL/L (ref 136–145)
TROPONIN I SERPL-MCNC: <0.06 NG/ML (ref ?–0.06)
WBC # BLD AUTO: 6.2 THOU/UL (ref 4–11)

## 2020-03-23 NOTE — EKG
[facility]
Kiran Cheng
[place]   [address]                     ELECTROCARDIOGRAM REPORT      
_______________________________________________________________________________
 
Name:       TERENCE BUCIO              Room #:                     DEP [facility]#:      [id number]                       Account #:      [id number]  
Admission:  20    Attend Phys:                          
Discharge:  20    Date of Birth:  [birth year]  
                                                          Report #: [id number]
                                                                    [id number]
_______________________________________________________________________________
THIS REPORT FOR:  
 
cc:  Anatoliy Wheat MD, Bernard O. MD Lundgren,Baudilio JIANG MD [facility]
THIS REPORT FOR:   //name//                          
 
                         [facility] ED
                                       
Test Date:    2020               Test Time:    15:43:01
Pat Name:     TERENCE BUCIO           Department:   
Patient ID:   [id number]            Room:          
Gender:       F                        Technician:   
:          1945               Requested By: Mago Monique
Order Number: [id number] MD:   Baudilio Nicole
                                 Measurements
Intervals                              Axis          
Rate:         95                       P:            47
MN:           185                      QRS:          -22
QRSD:         107                      T:            62
QT:           345                                    
QTc:          434                                    
                           Interpretive Statements
Sinus rhythm
Borderline left axis deviation
Poor R wave progression
Compared to ECG 2020 01:03:50
No significant change was found
Electronically Signed On 3- 7:54:40 CDT by Baudilio Nicole
[email or web address]
 
 
 
 
 
 
 
 
 
 
 
 
 
 
  <ELECTRONICALLY SIGNED>
   By: Baudilio Nicole MD, FACC   
  20     0754
D: 20 1543                           _____________________________________
T: 20 1543                           Baudilio Nicole MD, [facility]     /EPI

## 2020-03-30 ENCOUNTER — HOSPITAL ENCOUNTER (OUTPATIENT)
Dept: HOSPITAL 35 - HYPER | Age: 75
End: 2020-03-30
Attending: PREVENTIVE MEDICINE
Payer: COMMERCIAL

## 2020-03-30 DIAGNOSIS — J69.0: ICD-10-CM

## 2020-03-30 DIAGNOSIS — E66.09: ICD-10-CM

## 2020-03-30 DIAGNOSIS — I11.0: ICD-10-CM

## 2020-03-30 DIAGNOSIS — I87.2: ICD-10-CM

## 2020-03-30 DIAGNOSIS — D64.9: ICD-10-CM

## 2020-03-30 DIAGNOSIS — I50.32: ICD-10-CM

## 2020-03-30 DIAGNOSIS — M12.869: ICD-10-CM

## 2020-03-30 DIAGNOSIS — E11.49: ICD-10-CM

## 2020-03-30 DIAGNOSIS — F41.1: ICD-10-CM

## 2020-03-30 DIAGNOSIS — E55.9: ICD-10-CM

## 2020-03-30 DIAGNOSIS — E78.5: ICD-10-CM

## 2020-03-30 DIAGNOSIS — D17.9: ICD-10-CM

## 2020-03-30 DIAGNOSIS — F11.10: ICD-10-CM

## 2020-03-30 DIAGNOSIS — Z87.891: ICD-10-CM

## 2020-03-30 DIAGNOSIS — J44.9: ICD-10-CM

## 2020-03-30 DIAGNOSIS — K52.9: ICD-10-CM

## 2020-03-30 DIAGNOSIS — T81.89XD: Primary | ICD-10-CM

## 2020-03-30 DIAGNOSIS — Z79.84: ICD-10-CM
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
## 2020-03-30 DIAGNOSIS — M51.9: ICD-10-CM

## 2020-04-21 ENCOUNTER — HOSPITAL ENCOUNTER (INPATIENT)
Dept: HOSPITAL 35 - ER | Age: 75
LOS: 14 days | Discharge: NURSING, REHAB OR LONG-TERM CARE | DRG: 682 | End: 2020-05-05
Attending: HOSPITALIST | Admitting: HOSPITALIST
Payer: COMMERCIAL

## 2020-04-21 VITALS — SYSTOLIC BLOOD PRESSURE: 162 MMHG | DIASTOLIC BLOOD PRESSURE: 48 MMHG

## 2020-04-21 VITALS — SYSTOLIC BLOOD PRESSURE: 159 MMHG | DIASTOLIC BLOOD PRESSURE: 66 MMHG

## 2020-04-21 VITALS — WEIGHT: 234.4 LBS | BODY MASS INDEX: 41.53 KG/M2 | HEIGHT: 62.99 IN

## 2020-04-21 VITALS — DIASTOLIC BLOOD PRESSURE: 74 MMHG | SYSTOLIC BLOOD PRESSURE: 155 MMHG

## 2020-04-21 DIAGNOSIS — Z20.828: ICD-10-CM

## 2020-04-21 DIAGNOSIS — E66.9: ICD-10-CM

## 2020-04-21 DIAGNOSIS — K59.00: ICD-10-CM

## 2020-04-21 DIAGNOSIS — E11.9: ICD-10-CM

## 2020-04-21 DIAGNOSIS — E87.6: ICD-10-CM

## 2020-04-21 DIAGNOSIS — Z79.82: ICD-10-CM

## 2020-04-21 DIAGNOSIS — N17.0: Primary | ICD-10-CM

## 2020-04-21 DIAGNOSIS — E78.5: ICD-10-CM

## 2020-04-21 DIAGNOSIS — F32.9: ICD-10-CM

## 2020-04-21 DIAGNOSIS — E87.1: ICD-10-CM

## 2020-04-21 DIAGNOSIS — E55.9: ICD-10-CM

## 2020-04-21 DIAGNOSIS — R13.10: ICD-10-CM

## 2020-04-21 DIAGNOSIS — F41.9: ICD-10-CM

## 2020-04-21 DIAGNOSIS — I10: ICD-10-CM

## 2020-04-21 DIAGNOSIS — Z79.899: ICD-10-CM

## 2020-04-21 DIAGNOSIS — G92: ICD-10-CM

## 2020-04-21 DIAGNOSIS — R41.0: ICD-10-CM

## 2020-04-21 DIAGNOSIS — F39: ICD-10-CM

## 2020-04-21 DIAGNOSIS — G89.29: ICD-10-CM

## 2020-04-21 DIAGNOSIS — F03.90: ICD-10-CM

## 2020-04-21 DIAGNOSIS — N39.0: ICD-10-CM

## 2020-04-21 DIAGNOSIS — I77.6: ICD-10-CM

## 2020-04-21 DIAGNOSIS — M54.9: ICD-10-CM

## 2020-04-21 LAB
ALBUMIN SERPL-MCNC: 3.6 G/DL (ref 3.4–5)
ALT SERPL-CCNC: 19 U/L (ref 30–65)
ANION GAP SERPL CALC-SCNC: 6 MMOL/L (ref 7–16)
ANISOCYTOSIS BLD QL SMEAR: (no result)
AST SERPL-CCNC: 29 U/L (ref 15–37)
BACTERIA-REFLEX: (no result) /HPF
BILIRUB SERPL-MCNC: 0.5 MG/DL
BILIRUB UR-MCNC: NEGATIVE MG/DL
BUN SERPL-MCNC: 31 MG/DL (ref 7–18)
CALCIUM SERPL-MCNC: 9.9 MG/DL (ref 8.5–10.1)
CHLORIDE SERPL-SCNC: 101 MMOL/L (ref 98–107)
CO2 SERPL-SCNC: 37 MMOL/L (ref 21–32)
COLOR UR: YELLOW
CREAT SERPL-MCNC: 1.3 MG/DL (ref 0.6–1)
EOSINOPHIL NFR BLD: 9 % (ref 0–3)
ERYTHROCYTE [DISTWIDTH] IN BLOOD BY AUTOMATED COUNT: 15.5 % (ref 10.5–14.5)
GLUCOSE SERPL-MCNC: 138 MG/DL (ref 74–106)
GRANULOCYTES NFR BLD MANUAL: 60 % (ref 36–66)
HCT VFR BLD CALC: 37.6 % (ref 37–47)
HGB BLD-MCNC: 12 GM/DL (ref 12–15)
KETONES UR STRIP-MCNC: NEGATIVE MG/DL
LYMPHOCYTES NFR BLD AUTO: 26 % (ref 24–44)
MCH RBC QN AUTO: 27.2 PG (ref 26–34)
MCHC RBC AUTO-ENTMCNC: 32 G/DL (ref 28–37)
MCV RBC: 84.8 FL (ref 80–100)
MONOCYTES NFR BLD: 4 % (ref 1–8)
NEUTROPHILS # BLD: 4.1 THOU/UL (ref 1.4–8.2)
NEUTS BAND NFR BLD: 1 % (ref 0–8)
PLATELET # BLD: 187 THOU/UL (ref 150–400)
POLYCHROMASIA BLD QL SMEAR: (no result)
POTASSIUM SERPL-SCNC: 3.5 MMOL/L (ref 3.5–5.1)
PROT SERPL-MCNC: 6.9 G/DL (ref 6.4–8.2)
RBC # BLD AUTO: 4.43 MIL/UL (ref 4.2–5)
RBC # UR STRIP: NEGATIVE /UL
RBC #/AREA URNS HPF: (no result) /HPF (ref 0–2)
SODIUM SERPL-SCNC: 144 MMOL/L (ref 136–145)
SP GR UR STRIP: 1.02 (ref 1–1.03)
SQUAMOUS: (no result) /LPF (ref 0–3)
TROPONIN I SERPL-MCNC: <0.06 NG/ML (ref ?–0.06)
URINE CLARITY: (no result)
URINE GLUCOSE-RANDOM*: NEGATIVE
URINE LEUKOCYTES-REFLEX: (no result)
URINE NITRITE-REFLEX: NEGATIVE
URINE PROTEIN (DIPSTICK): NEGATIVE
URINE WBC-REFLEX: (no result) /HPF (ref 0–5)
UROBILINOGEN UR STRIP-ACNC: 0.2 E.U./DL (ref 0.2–1)
WBC # BLD AUTO: 6.7 THOU/UL (ref 4–11)

## 2020-04-21 PROCEDURE — 10081 I&D PILONIDAL CYST COMP: CPT

## 2020-04-21 PROCEDURE — 10879: CPT

## 2020-04-21 NOTE — EEG
[facility]
Kiran Cheng
[place]  [address]                      ELECTROENCEPHALOGRAM          
_______________________________________________________________________________
 
Name:       TERENCE BUCIO               Room #:         216-P       [facility] [place]  
M.R.#:      [id number]      Account #:      [id number]  
Admission:  04/21/20     Attend Phys:    Carter Harris MD    
Discharge:               Date of Birth:  [birth year]  
                                                           Report #: [id number]
    [id number]  
_______________________________________________________________________________
THIS REPORT FOR:   //name//                          
 
CC: Anatoliy Harris
 
DATE OF SERVICE:  04/28/2020
 
 
This patient is being evaluated for altered mental status.  EEG was done by
placing the electrode by standard 10-20 system of electrode placement.  The
patient is not able to cooperate at all.  A lot of artifact is present. 
Background activity in this patient's EEG is about 7 Hz and 30 microvolts.  A
lot of artifact is present.  Photic stimulation is unremarkable.  Some of the
activity looked sharp, but is difficult to distinguish from the artifact.
 
IMPRESSION:  Very suboptimal EEG because the patient is unable to cooperate. 
EEG is abnormal, is consistent with encephalopathy or dementia, but some seizure
activity cannot be ruled out because the patient is unable to cooperate.
 
 
 
 
 
 
 
 
 
 
 
 
 
 
 
 
 
 
 
 
 
 
 
 
 
 
                              
   By:                               
                   
D: 04/29/20 1448                           _____________________________________
T: 04/29/20 1457                           Philip Loo MD           /nt

## 2020-04-21 NOTE — EMS
[address]   [address]                     EMS Patient Care Report       
_______________________________________________________________________________
 
Name:       TERENCE BUCIO              Room #:                     PRE   
MAIYANA.#:      [id number]                       Account #:      [id number]  
Admission:              Attend Phys:                          
Discharge:              Date of Birth:  [birth year]  
                                                          Report #: [id number]
                                                                    [id number]
_______________________________________________________________________________
THIS REPORT FOR:   //name//                          
 
Report Transmitted: 2020 16:13
EMS Care Summary
[place], [place]/[facility]
Incident [id number] @ 2020 15:32
 
Incident Location
[address] [place] [address]
 
Patient
TERENCE BUCIO
Female, 75 Years
 1945
 
Patient Address
[address] [place] [address]
 
Patient History
Diabetes,Hypertension (HTN),Hyperlipidemia,Depression,Anxiety,Back Pain 
(Chronic), 
 
Patient Allergies
No known allergies,
 
Patient Medications
Ativan, Alprazolam, Percocet, Amitriptyline, Meloxicam, Cymbalta, 
Hydrochlorothiazide (Hctz), Glipizide, Lisinopril, Toprol, Simvastatin, 
 
Chief Complaint
PT BELIEVE FAMILY IS TRYING TO GET HER
 
Disposition
Transported No Lights/[place]
 
Dispatch Reason
Breathing Problem
 
Transported To
[facility]
 
Narrative
PT STATES THAT PT HAS HAD IT WITH HER FAMILY AND THAT THEY ARE TRYING TO GET 
HER. PT STATES THAT PT HAS PAON IN HER FEET AND IS SOA. PT STATES THAT PT IS 
 
 
 
[address]   [address]                     EMS Patient Care Report       
_______________________________________________________________________________
 
Name:       TERENCE BUCIO              Room #:                     PRE ER  
M.R.#:      [id number]                       Account #:      [id number]  
Admission:              Attend Phys:                          
Discharge:              Date of Birth:  [birth year]  
                                                          Report #: [id number]
                                                                    [id number]
_______________________________________________________________________________
CHRONICALLY SOA. PT DENIES COUGH, PT DENIES FEVER. PT STATES IN ROUTE THAT SHE 
WOULD LIKE EMS TO CHECK THE CABINET TO MAKE SURE HER DAUGHTER IN [place] IS NOT 
HIDDING IN THEM. PT HAS NO OTEHR COMPLAINTS. 
 
P WAS FOUND SITTING ON A COUCH IN PT'S HOME. PT SPOKE IN FULL AND COMPLETE 
SENTENCES. PT IS ABLE TO STAND AND WALK TO THE AMBULANCE. PT HAS NO OTHER 
OBVIOUS ABNORMALITIES. 
 
Initial Vitals
@15:58P: 223,SpO2: 92,
@16:00P: 169,SpO2: 91,
@15:55P: 113,R: 18,Pain: 0/10,GCS: 15,SpO2: 81,
@15:56P: 153,R: 18,GCS: 14,Glucose: 130,SpO2: 91,
@16:01P: 128,BP: 117/45,SpO2: 89,
@15:48P: 101,BP: 177/80,SpO2: 92,
 
Assessments
@15:38MENTAL:Person Oriented,Time Oriented,Place Oriented,Event 
Oriented,SKIN:HEENT:Eyes: Left Pupil: 3-mm,Eyes: Right Pupil: 3-mm,Head/Face: 
No Abnormalities,Neck/Airway: No Abnormalities,LUNG SOUNDS:General: No 
Abnormalities,ABDOMEN:General: No 
Abnormalities,PELVIS//GI:EXTREMITIES:Capillary Refill: Right Upper: < 2 
Sec,Left Arm: No Abnormalities,Right Arm: No Abnormalities,Left Leg: No 
Abnormalities,Right Leg: No Abnormalities,PULSE:Radial: 2+ Normal,NEURO:No 
Abnormalities, 
 
Impression
Altered Mental Status
 
Procedures
@15:39ALS AssessmentResponse: UnchangedSucceeded@15:423-Lead ECGResponse: 
UnchangedSucceeded 
 
Timeline
15:31,Call Received
15:31,Dispatch Notified
15:32,Dispatched
15:33,En Route
15:37,On Scene
15:38,At Patient
15:39,ALS Assessment,Response: UnchangedSucceeded,
15:42,3-Lead ECG,Response: UnchangedSucceeded,
15:48,BP: 177/80 M,PULSE: 101,RR:  R,SPO2: 92 Ox,ETCO2:  ,BG: ,PAIN: ,GCS: ,
15:54,Depart Scene
15:55,BP: / M,PULSE: 113,RR: 18 R,SPO2: 81 Ox,ETCO2:  ,BG: ,PAIN: 0,GCS: 15,
15:56,BP: / M,PULSE: 153,RR: 18 R,SPO2: 91 Ox,ETCO2:  ,B,PAIN: ,GCS: 14,
 
 
 
[facility]
[address]
[place]   [address]                     EMS Patient Care Report       
_______________________________________________________________________________
 
Name:       TERENCE BUCIO              Room #:                     PRE ER  
M.RCarrie#:      [id number]                       Account #:      [id number]  
Admission:              Attend Phys:                          
Discharge:              Date of Birth:  [birth year]  
                                                          Report #: [id number]
                                                                    [id number]
_______________________________________________________________________________
15:58,BP: / M,PULSE: 223,RR:  R,SPO2: 92 Ox,ETCO2:  ,BG: ,PAIN: ,GCS: ,
16:00,BP: / M,PULSE: 169,RR:  R,SPO2: 91 Ox,ETCO2:  ,BG: ,PAIN: ,GCS: ,
16:01,BP: 117/45 M,PULSE: 128,RR:  R,SPO2: 89 Ox,ETCO2:  ,BG: ,PAIN: ,GCS: ,
16:08,At Destination
16:12,Call Closed
 
Disclaimer
v1.1     Copyright 2020 [email or web address]
This EMS Care Summary contains data elements from the applicable legal record 
(which may be displayed differently). It is designed to provide pertinent 
information for the following purposes: continuity of care, clinical quality, 
and state data reporting. The complete legal record is available to ED staff 
and administrators of the receiving hospital in [facility]'s Patient Tracker. All data 
is provided "as is."

## 2020-04-21 NOTE — HC
[facility]
Kiran Cheng
[place]   [address]                     CONSULTATION                  
_______________________________________________________________________________
 
Name:       TERENCE BUCIO              Room #:         216-P       [facility] [place]  
.R.#:      [id number]                       Account #:      [id number]  
Admission:  04/21/20    Attend Phys:    Carter Harris MD     
Discharge:              Date of Birth:  [birth year]  
                                                          Report #: [id number]
                                                                    [id number]   
_______________________________________________________________________________
THIS REPORT FOR:  
 
cc:  Anatoliy Wheat MD, Bernard O. MD Khosla, Parveen K. MD                                         ~
CC: Anatoliy Harris
 
DATE OF SERVICE:  04/28/2020
 
 
HISTORY OF PRESENT ILLNESS:  A 75-year-old female patient who is unable to
provide any history at all.  In fact, the patient did not talk and had no verbal
output during my evaluation.  I tried to call the patient's son on the numbers I
have in the chart.  I was unable to reach them.  Dr. Wilson updated me on the
patient's history.  It looks like the patient was having symptoms for more than
a week before she came in.  It was mostly delusions and she was found to have a
UTI and she is being treated, but I do not know the exact baseline mental status
in this patient.  She apparently was taking also some Xanax and Percocet.  She
came as hyponatremic and she is still somewhat hyponatremic.
 
REVIEW OF SYSTEMS:  A 14-point review of system was attempted.  The patient is
unable to provide any systemic history, it is all from the record and that is
also limited, apparently has a history of diabetes, hypertension, depression,
anxiety, back pain as I can get from the record, that was the relevant 14-point
review of system.
 
PAST MEDICAL History:  Unavailable, but may be having symptoms suggestive of
dementia.
 
FAMILY HISTORY:  Unavailable.
 
SOCIAL HISTORY:  She does have a son and I tried to contact him, but I was
unable to reach him.
 
PHYSICAL EXAMINATION:  Indicates, she opens her eyes.  She did not say a single
word.  She did not follow any command.  She does appear to have some jerking,
but they do not appear to be a heidy convulsion.  I cannot tell if she moves
anything, I cannot tell a meningeal sign.  She did not allow me to look at the
fundus.  She is obese individual.  Her pulse is 99, temperature is 98, blood
pressure is 163/71.  Cardiac examination is unremarkable.  She does have some
respiratory difficulty.
 
LABORATORY DATA:  Indicated normal white count, but slightly low sodium; at one
time, her magnesium was low.
 
 
 
 
[address]   [address]                     CONSULTATION                  
_______________________________________________________________________________
 
Name:       TERENCE BUCIO              Room #:         216-P       [facility] [place]  
[facility]#:      [id number]                       Account #:      [id number]  
Admission:  04/21/20    Attend Phys:    Carter Harris MD     
Discharge:              Date of Birth:  [birth year]  
                                                          Report #: [id number]
                                                                    [id number]   
_______________________________________________________________________________
IMPRESSION:  Difficult to form in this patient because I cannot reach the
patient's family.  It would appear this patient has significant encephalopathy. 
We need to see how much dementia she has in the baseline and hopefully we can
get some assessment of that after we talked to the patient's son.
 
RECOMMENDATIONS:
1.  EEG.
2.  I agree with your plan that we may have to do an MRI and maybe an LP in this
patient depending upon what history we get from the patient's son.  Hopefully,
he will call us back and we can discuss that.  In the meantime, we will go ahead
and get an EEG done.
 
Thank you very much for this referral.
 
 
 
 
 
 
 
 
 
 
 
 
 
 
 
 
 
 
 
 
 
 
 
 
 
 
 
 
 
 
 
                         
   By:                               
                   
D: 04/28/20 1259                           _____________________________________
T: 04/28/20 1423                           Philip Loo MD           /nt

## 2020-04-21 NOTE — NUR
ATTEMPTED TO CALL REPORT TO CCU AND US STATES SHE WAS NOT AWARE OF THIS 
AND THERE IS NO NURSE ASSIGNED AND SHE WILL CALL THE HOUSE SUP AND CALL ER
AFTER SHE GETS MORE INFO

## 2020-04-21 NOTE — NUR
PT ADMITTED FROM HOME. AOX2 PERSON AND SITUATION. PT PRESENTS WITH CONFUSION
FOR THE LAST WEEK AND A COUGH. PT A POOR HISTORIAN. PT NOT ABLE TO STATE
REASON FOR BEING AT HOSPITAL BUT STATED SHE HAS BEEN TO [facility] PREVIOUSLY.
PT REQUESTED AND  ASSISTED WITH CALLING
HER SON GLORIA, PT TOLD GLORIA THAT JEFFREY IS EMPTYING OUT HER BANK ACCOUNT AND
NOT LETTING HER USE HER CARE.
PT PRESENTED WITH A RING ON MOST FINGERS, 6 ABLE
TO BE REMOVED, 3 REMAIN ON DUE TO SWELLING. PT HAD ON 2 WRIST BRACELETS AND
ONE ANKLE BRACELET. BRACELETS REMOVED AND WITH 6 RINGS IN PLASTIC BAG IN ROOM
WITH CELL PHONE. PT IS IN ISOLATION FOR COVID R/O. LUNGS DIMINISHED, HR
DISTANT, BLE EDEMA +2, PT OBESE, COUGH DRY. PT REPORTS USING A WALKER AT HOME
BUT WAS NOT ABLE TO ASSIST WITH TRANSFER FROM ED CART TO BED. PT PRESENTED
WITH INCONTINENCE BRIEF ON. BED ALARM ON. IVF INTACT.

## 2020-04-22 VITALS — DIASTOLIC BLOOD PRESSURE: 71 MMHG | SYSTOLIC BLOOD PRESSURE: 169 MMHG

## 2020-04-22 VITALS — DIASTOLIC BLOOD PRESSURE: 93 MMHG | SYSTOLIC BLOOD PRESSURE: 163 MMHG

## 2020-04-22 VITALS — SYSTOLIC BLOOD PRESSURE: 177 MMHG | DIASTOLIC BLOOD PRESSURE: 59 MMHG

## 2020-04-22 VITALS — SYSTOLIC BLOOD PRESSURE: 220 MMHG | DIASTOLIC BLOOD PRESSURE: 130 MMHG

## 2020-04-22 VITALS — SYSTOLIC BLOOD PRESSURE: 190 MMHG | DIASTOLIC BLOOD PRESSURE: 88 MMHG

## 2020-04-22 VITALS — DIASTOLIC BLOOD PRESSURE: 80 MMHG | SYSTOLIC BLOOD PRESSURE: 169 MMHG

## 2020-04-22 LAB
ANION GAP SERPL CALC-SCNC: 8 MMOL/L (ref 7–16)
BUN SERPL-MCNC: 23 MG/DL (ref 7–18)
CALCIUM SERPL-MCNC: 9.1 MG/DL (ref 8.5–10.1)
CHLORIDE SERPL-SCNC: 104 MMOL/L (ref 98–107)
CO2 SERPL-SCNC: 33 MMOL/L (ref 21–32)
CREAT SERPL-MCNC: 1 MG/DL (ref 0.6–1)
FOLATE SERPL-MCNC: 15.2 NG/ML (ref 8.6–58.9)
GLUCOSE SERPL-MCNC: 118 MG/DL (ref 74–106)
POTASSIUM SERPL-SCNC: 2.9 MMOL/L (ref 3.5–5.1)
SODIUM SERPL-SCNC: 145 MMOL/L (ref 136–145)
TSH SERPL-ACNC: 1.87 UIU/ML (ref 0.36–3.74)
VIT B12 SERPL-MCNC: 1606 PG/ML (ref 193–986)

## 2020-04-22 NOTE — NUR
Daughter in law notified of room change to 216. Report given to 2N nurse. Pt
had all of her belongings transferred with her including rings bracelets and
cell phone.

## 2020-04-22 NOTE — NUR
ASSESSMENT: CM REVIEWED CHART AND SPOKE WITH ATTENDING. PT WAS ADMITTED DUE TO
AMS AND IS COVID RULE OUT. PTS COVID TESTING IS STILL PENDING. CM ATTEMPTED TO
REACH PATIENT VIA PHONE TO SEE IF SHE WAS ABLE TO ANSWER AND QUESTIONS BUT PT
DID NOT ANSWER PHONE. CM ATTEMPTED TO REACH PATIENTS SON SARAN WHO SHE LIVES
WITH BUT NO ANSWER AT THIS TIME AND VM WAS LEFT. PT HAS BEEN TO [facility] IN THE
PAST AND PER PAST REPORT PT IS LIVING AT HOME WITH HER SON AND DAUGHTER IN
[place]. PT HAS A WALKER, WHEELCHAIR, AND CANE AT HOME FOR AMBULATION. FAMILY
REPORTED TO BEDSIDE RN THAT PATIENT HAS BECOME HARD TO CARE FOR AND HAS BEEN
VERY CONFUSED.  PT HAS HAD [facility] IN THE PAST. CM CONTACTED [facility]
AND THEY REPORT THEY DISCHARGED PATIENT IN FEBRUARY. PT HAS ALSO BEEN TO [facility]
IN THE PAST. PTS PCP IS DR. ADRIANA ALONZO AND SHE HAS A PAIN MANAGEMENT
PHYSICIAN DR. GEE. CM LEFT  FOR FAMILY AND WAITING FOR A CALL BACK TO
OBTAIN FURTHER INFORMATION/HISTORY ON PT AT THIS TIME. CM WILL CONTINUE TO
FOLLOW TO ASSIST AS NEEDED.

## 2020-04-22 NOTE — NUR
PT CALLED OUT FOR NURSE BY YELLING NURSE. PT STATED SHE PEED, FEMALE EXT CATH
COLLECTED URINE. PT THEN STATED SHE COULD NOT BREATH, BY POINTING AT HER MOUTH
AND MOUTHING WORDS. ROOM AIR 90%, 02 2L NC 94%. PROVIDER NOTIFIED. PT STATED
SHE CAN NOT SLEEP BECAUSE SHE DID NOT HAVE HER BEDTIME MEDICATIONS. PT HAS
BEEN OBSERVED SLEEPING DURING ROUNDS.

## 2020-04-22 NOTE — NUR
MANUAL /134 PT GIVEN HS MEDS. HX HTN. NOT SYMPTOMATIC. LUNGS CLEAR,
DIMINISHED BASES. HR REG, BS POSITIVE. OBESE. BLE EDEMA +2. IVF INTACT. FEMALE
EXT CATH INTACT. PT ALERT TO SELF AND SITUATION. TALKING TO REMOTE AND SELF.
CALM TONE. TWO PERSON ASSIST WITH TRANSFERS. BED ALARM ON. PT TRANSFERING TO
[facility].

## 2020-04-22 NOTE — NUR
PT CALLED OUT FOR NURSE BY BANGING REMOTE ON SIDE RAIL. PT STATED THERE ARE
LIGHTS GOING OFF IN HER ROOM, THAT SHE NEEDS TO GET UP AND OUT OF HERE. PT
VERBALLY REDIRECTED THAT IT IS TO EARLY IN THE AM TO GO ANYWHERE AND THAT SHE
NEEDS MORE ANTIBIOTICS.

## 2020-04-22 NOTE — NUR
PATIENT FREQUENTLY YELLING OUT AT STAFF, CONFUSED. ORIENTED TO SELF ONLY.
SPOKE WITH PATIENT FAMILY, VERIFIED PATIENT HISTORY AND MEDICATION
RECONCILIATION. FAMILY REPORTS PATIENT HAS BECOME DIFFICULT TO CARE FOR. HAS
HAD MULTIPLE FALLS SINCE LAST DISCHARGE FROM HOSPITAL MARCH 2020. STATES THAT
SHE WILL START WALKING AND THEN "GIVE UP AND DROP TO GROUND SUDDENLY."
FAMILY STATES THAT PATIENT UNABLE TO CARE FOR SELF. REQUIRES ASSISTANCE FOR
FOOD PREPARATION, BATHING, TOILETING, ETC. STATES THAT PATIENT BEHAVIOR HAS
BEEN ERRATIC LATELY, PATIENT REFUSING MEDS, SECRETIVE WITH FAMILY, CALLING 911
FROM HOME BUT DID NOT TELL FAMILY. REPORTS THAT HAS A PCP- DR. ADRIANA ALONZO, &
PAIN MANAGEMENT PHYSICIAN - DR. GEE. REPORTS THAT PAIN MANAGEMENT
PHYSICIAN MANAGES PAIN & PSYCH MEDS. FALL PRECAUTIONS IN PLACE CURRENTLY,
PATIENT DOES NOT USE CALL LIGHT APPROPRIATELY RATHER YELLS OUT FOR STAFF FROM
ROOM. PATIENT NOT PROGRESSING TOWARDS GOALS FOR DISCHARGE.

## 2020-04-23 VITALS — DIASTOLIC BLOOD PRESSURE: 93 MMHG | SYSTOLIC BLOOD PRESSURE: 180 MMHG

## 2020-04-23 VITALS — DIASTOLIC BLOOD PRESSURE: 116 MMHG | SYSTOLIC BLOOD PRESSURE: 143 MMHG

## 2020-04-23 VITALS — SYSTOLIC BLOOD PRESSURE: 152 MMHG | DIASTOLIC BLOOD PRESSURE: 87 MMHG

## 2020-04-23 VITALS — DIASTOLIC BLOOD PRESSURE: 69 MMHG | SYSTOLIC BLOOD PRESSURE: 168 MMHG

## 2020-04-23 VITALS — DIASTOLIC BLOOD PRESSURE: 79 MMHG | SYSTOLIC BLOOD PRESSURE: 155 MMHG

## 2020-04-23 LAB
ANION GAP SERPL CALC-SCNC: 8 MMOL/L (ref 7–16)
BUN SERPL-MCNC: 13 MG/DL (ref 7–18)
CALCIUM SERPL-MCNC: 9.2 MG/DL (ref 8.5–10.1)
CHLORIDE SERPL-SCNC: 106 MMOL/L (ref 98–107)
CO2 SERPL-SCNC: 31 MMOL/L (ref 21–32)
CREAT SERPL-MCNC: 0.8 MG/DL (ref 0.6–1)
ERYTHROCYTE [DISTWIDTH] IN BLOOD BY AUTOMATED COUNT: 15.3 % (ref 10.5–14.5)
GLUCOSE SERPL-MCNC: 139 MG/DL (ref 74–106)
HCT VFR BLD CALC: 34.5 % (ref 37–47)
HGB BLD-MCNC: 11.1 GM/DL (ref 12–15)
MCH RBC QN AUTO: 27.8 PG (ref 26–34)
MCHC RBC AUTO-ENTMCNC: 32.1 G/DL (ref 28–37)
MCV RBC: 86.6 FL (ref 80–100)
PLATELET # BLD: 166 THOU/UL (ref 150–400)
POTASSIUM SERPL-SCNC: 4.6 MMOL/L (ref 3.5–5.1)
RBC # BLD AUTO: 3.98 MIL/UL (ref 4.2–5)
SODIUM SERPL-SCNC: 145 MMOL/L (ref 136–145)
WBC # BLD AUTO: 7.4 THOU/UL (ref 4–11)

## 2020-04-23 NOTE — NUR
ASSUMED CARE 0700. ALERT TO SELF ONLY, TALKS ABOUT HER GRANDDAUGHTER ELIO.
REQUIRES [id number]. NOON MEAL WAS FOUND ON THE FLOOR. PT DENIED DUMPING
MEAL. ASSISTED WITH REVENING MEAL ONLY EATING 10% OF MEAL. OFFERED APPLE JUICE
FOR LOW BS.DR RUCKER NOTIFIED OF ELEVATED BP PT REMAINS SYMPTOM FREE. RECHECK
BP USING LOWER ARM WITH /71 VERSES 170/77 UPPER ARM. INCONITENT OF
BLADDER REFUSES EXTERNAL FEMALE CATH. REPOSITIONED AS TOLERATED. DOES NOT CALL
FOR ASSISTANCE. CLOSE TO NURSE STATION FOR OVERSIGHT.STAFF TO ANTICIPATE
NEEDS. NSR ON TELE. BED ALARM ON. PERSONAL ITEMS AND CALL LIGHT IN REACH.

## 2020-04-23 NOTE — NUR
Case discussed with the care team. Shankar seeing and pt noted to be more
confused today. Therapy evals pending. No response back from the pt's son yet.
DC needs are uncertain pending her progress. Pt normally goes to [facility] for
rehab and has had hh with [facility] in the recent past. She was at
[facility] less than a month ago and had HH in February this year. She lives with
her son and dtr in law and is normally a&ox3 and functional with assist device
and supervision from family. Will follow.

## 2020-04-24 VITALS — DIASTOLIC BLOOD PRESSURE: 87 MMHG | SYSTOLIC BLOOD PRESSURE: 182 MMHG

## 2020-04-24 VITALS — SYSTOLIC BLOOD PRESSURE: 187 MMHG | DIASTOLIC BLOOD PRESSURE: 87 MMHG

## 2020-04-24 VITALS — SYSTOLIC BLOOD PRESSURE: 172 MMHG | DIASTOLIC BLOOD PRESSURE: 83 MMHG

## 2020-04-24 VITALS — SYSTOLIC BLOOD PRESSURE: 157 MMHG | DIASTOLIC BLOOD PRESSURE: 73 MMHG

## 2020-04-24 VITALS — DIASTOLIC BLOOD PRESSURE: 85 MMHG | SYSTOLIC BLOOD PRESSURE: 176 MMHG

## 2020-04-24 LAB
ANION GAP SERPL CALC-SCNC: 9 MMOL/L (ref 7–16)
BUN SERPL-MCNC: 9 MG/DL (ref 7–18)
CALCIUM SERPL-MCNC: 9.1 MG/DL (ref 8.5–10.1)
CHLORIDE SERPL-SCNC: 104 MMOL/L (ref 98–107)
CO2 SERPL-SCNC: 29 MMOL/L (ref 21–32)
CREAT SERPL-MCNC: 0.7 MG/DL (ref 0.6–1)
ERYTHROCYTE [DISTWIDTH] IN BLOOD BY AUTOMATED COUNT: 15.4 % (ref 10.5–14.5)
GLUCOSE SERPL-MCNC: 130 MG/DL (ref 74–106)
HCT VFR BLD CALC: 33 % (ref 37–47)
HGB BLD-MCNC: 10.7 GM/DL (ref 12–15)
MCH RBC QN AUTO: 27.7 PG (ref 26–34)
MCHC RBC AUTO-ENTMCNC: 32.3 G/DL (ref 28–37)
MCV RBC: 85.8 FL (ref 80–100)
PLATELET # BLD: 150 THOU/UL (ref 150–400)
POTASSIUM SERPL-SCNC: 3.6 MMOL/L (ref 3.5–5.1)
RBC # BLD AUTO: 3.85 MIL/UL (ref 4.2–5)
SODIUM SERPL-SCNC: 142 MMOL/L (ref 136–145)
WBC # BLD AUTO: 6.7 THOU/UL (ref 4–11)

## 2020-04-24 NOTE — NUR
PT A&OX2, DELUSIONS, VSS, DENIES PAIN. PATIENT REMOVED IV AND DOCTOR AWARE.
PATIENT TOOK MOST OF MEDICATION THIS MORNING. PATIENT ANXIOUS ABOUT
DISCHARGING, ABLE TO REDIRECT HALF OF TIMES. NO SIGNS OF DISTRESS. PATIENT
CLOSE TO NURSE STATION, BED ALARM ON. WILL CONTINUE TO MONITOR.

## 2020-04-24 NOTE — NUR
PT A0X1.  CONFUSED, AND AGITATION AT SOME POINT.  DENIES CHEST PAIN.  C/O OF
BACK PAIN, BUT WHEN GIVEN MEDS,  PT THROWS MEDS, REFUSES TO TAKE MEDS.
UNABLE TO INTERVENE WITH OTHER RNs. WILL CONTINUE TO MONITOR.

## 2020-04-25 VITALS — DIASTOLIC BLOOD PRESSURE: 71 MMHG | SYSTOLIC BLOOD PRESSURE: 169 MMHG

## 2020-04-25 VITALS — SYSTOLIC BLOOD PRESSURE: 158 MMHG | DIASTOLIC BLOOD PRESSURE: 66 MMHG

## 2020-04-25 VITALS — DIASTOLIC BLOOD PRESSURE: 80 MMHG | SYSTOLIC BLOOD PRESSURE: 159 MMHG

## 2020-04-25 VITALS — DIASTOLIC BLOOD PRESSURE: 95 MMHG | SYSTOLIC BLOOD PRESSURE: 192 MMHG

## 2020-04-25 VITALS — DIASTOLIC BLOOD PRESSURE: 66 MMHG | SYSTOLIC BLOOD PRESSURE: 154 MMHG

## 2020-04-25 NOTE — NUR
ASSUME CARE 1900. PT/VITALS STABLE. INTERMITTENT BACK PAIN/DICLOFENAC FOR
PAIN. PT IS A/O TO PERSON ONLY AND IMPULSIVE SOMETIMES. NEEDS REDIRECTION WITH
TAKING MEDS AND OTHER ACTIVITIES. NO DISTRESS NOTED THROUGH THE NIGHT.
MODERATE REST NOTED. ASSESSMENT AS CHARTED. PROGRESSING MODERATELY WITH POC.
PLAN IS CONTINUE WITH ABX TREATMENT AND CONTINUE TO MONITOR LOC. WILL CONTINUE
TO FOLLOW [facility] POC

## 2020-04-25 NOTE — NUR
PT ALERT AND ORIENTED TO SELF, PATIENT DELUSIONAL AND HAS VISUAL
HALLLUCINATIONS. PATIENT IMPULSIVE, PATIENT CLOSE TO NURSE STATION AND BED
ALARM ON. IV RIGHT BREAST REMAINS INTACT. BLOOD PRESSURE ELEVATED, HYDRALAZINE
GIVEN PER ORDERS. PATIENT IN NO APPARENT PAIN, NO SIGNS OF DISTRESS. WILL
CONTINUE TO MONITOR.

## 2020-04-25 NOTE — EKG
[facility]
[address] [place]   [address]                     ELECTROCARDIOGRAM REPORT      
_______________________________________________________________________________
 
Name:       TERENCE BUCIO              Room #:         216-P       [facility] [place]  
M.R.#:      [id number]                       Account #:      [id number]  
Admission:  20    Attend Phys:    Carter Harris MD     
Discharge:              Date of Birth:  [birth year]  
                                                          Report #: [id number]
                                                                    [id number]
_______________________________________________________________________________
THIS REPORT FOR:  
 
cc:  Anatoliy Wheat MD, Bernard O. MD Couchonnal, Luis F. MD                                            ~
THIS REPORT FOR:   //name//                          
 
                          [facility]
                                       
Test Date:    2020               Test Time:    19:17:55
Pat Name:     TERENCE BUCIO           Department:   
Patient ID:   [id number]            Room:         216 P
Gender:       F                        Technician:   JJ
:          1945               Requested By: Carter Harris
Order Number: [id number] MD:   Lalo Bailey
                                 Measurements
Intervals                              Axis          
Rate:         103                      P:            34
NM:           65                       QRS:          -12
QRSD:         106                      T:            27
QT:           365                                    
QTc:          478                                    
                           Interpretive Statements
Sinus tachycardia
No previous ECG available for comparison
 
Electronically Signed On 2020 11:53:16 CDT by Lalo Bailey
[email or web address]
 
 
 
 
 
 
 
 
 
 
 
 
 
 
 
 
  <ELECTRONICALLY SIGNED>
   By: Lalo Bailey MD        
  20     1153
D: 20                           _____________________________________
T: 20                           Lalo Bailey MD          /EPI

## 2020-04-26 VITALS — DIASTOLIC BLOOD PRESSURE: 64 MMHG | SYSTOLIC BLOOD PRESSURE: 155 MMHG

## 2020-04-26 VITALS — SYSTOLIC BLOOD PRESSURE: 153 MMHG | DIASTOLIC BLOOD PRESSURE: 64 MMHG

## 2020-04-26 VITALS — DIASTOLIC BLOOD PRESSURE: 58 MMHG | SYSTOLIC BLOOD PRESSURE: 159 MMHG

## 2020-04-26 VITALS — SYSTOLIC BLOOD PRESSURE: 163 MMHG | DIASTOLIC BLOOD PRESSURE: 63 MMHG

## 2020-04-26 VITALS — DIASTOLIC BLOOD PRESSURE: 51 MMHG | SYSTOLIC BLOOD PRESSURE: 154 MMHG

## 2020-04-26 LAB
ANION GAP SERPL CALC-SCNC: 8 MMOL/L (ref 7–16)
ANION GAP SERPL CALC-SCNC: 8 MMOL/L (ref 7–16)
BASOPHILS NFR BLD AUTO: 0.7 % (ref 0–2)
BUN SERPL-MCNC: 5 MG/DL (ref 7–18)
BUN SERPL-MCNC: 7 MG/DL (ref 7–18)
CALCIUM SERPL-MCNC: 9.3 MG/DL (ref 8.5–10.1)
CALCIUM SERPL-MCNC: 9.8 MG/DL (ref 8.5–10.1)
CHLORIDE SERPL-SCNC: 95 MMOL/L (ref 98–107)
CHLORIDE SERPL-SCNC: 95 MMOL/L (ref 98–107)
CO2 SERPL-SCNC: 29 MMOL/L (ref 21–32)
CO2 SERPL-SCNC: 31 MMOL/L (ref 21–32)
CREAT SERPL-MCNC: 0.5 MG/DL (ref 0.6–1)
CREAT SERPL-MCNC: 0.6 MG/DL (ref 0.6–1)
EOSINOPHIL NFR BLD: 6.6 % (ref 0–3)
ERYTHROCYTE [DISTWIDTH] IN BLOOD BY AUTOMATED COUNT: 15.6 % (ref 10.5–14.5)
ERYTHROCYTE [DISTWIDTH] IN BLOOD BY AUTOMATED COUNT: 15.6 % (ref 10.5–14.5)
GLUCOSE SERPL-MCNC: 127 MG/DL (ref 74–106)
GLUCOSE SERPL-MCNC: 137 MG/DL (ref 74–106)
GRANULOCYTES NFR BLD MANUAL: 64.1 % (ref 36–66)
HCT VFR BLD CALC: 33.8 % (ref 37–47)
HCT VFR BLD CALC: 35 % (ref 37–47)
HGB BLD-MCNC: 10.9 GM/DL (ref 12–15)
HGB BLD-MCNC: 11.4 GM/DL (ref 12–15)
LYMPHOCYTES NFR BLD AUTO: 20.3 % (ref 24–44)
MCH RBC QN AUTO: 27.2 PG (ref 26–34)
MCH RBC QN AUTO: 27.7 PG (ref 26–34)
MCHC RBC AUTO-ENTMCNC: 32.2 G/DL (ref 28–37)
MCHC RBC AUTO-ENTMCNC: 32.6 G/DL (ref 28–37)
MCV RBC: 84.6 FL (ref 80–100)
MCV RBC: 85.1 FL (ref 80–100)
MONOCYTES NFR BLD: 8.3 % (ref 1–8)
NEUTROPHILS # BLD: 5.9 THOU/UL (ref 1.4–8.2)
PLATELET # BLD: 146 THOU/UL (ref 150–400)
PLATELET # BLD: 190 THOU/UL (ref 150–400)
POTASSIUM SERPL-SCNC: 3.3 MMOL/L (ref 3.5–5.1)
POTASSIUM SERPL-SCNC: 4.4 MMOL/L (ref 3.5–5.1)
RBC # BLD AUTO: 4 MIL/UL (ref 4.2–5)
RBC # BLD AUTO: 4.12 MIL/UL (ref 4.2–5)
SODIUM SERPL-SCNC: 132 MMOL/L (ref 136–145)
SODIUM SERPL-SCNC: 134 MMOL/L (ref 136–145)
WBC # BLD AUTO: 7.2 THOU/UL (ref 4–11)
WBC # BLD AUTO: 9.1 THOU/UL (ref 4–11)

## 2020-04-26 NOTE — NUR
Assumed patient care at 0715. Vital signs stable, abdomen is soft and
non-tender, BS x's 4, LSCTA (diminished), skin is clean, warm, dry and intact;
she denies pain. Patient had an oral Temperature of 99.8 this am, she was
given Tylenol 650mg po for this with effectiveness. Patient tried to hit this
nurse x's two during am Assessment. Patient asks questions which don't make
sense, answers this nurse's questions (again, not making sense). Am
medications were crushed, put in applesauce. Patient spit some of the
medications out, clenched her jaws tight. Patient was assisted back to bed
several x's, as she kept trying to climb out of bed.
Dr Harris informed about confusion and behaviors; to continue to monitor.
Report given to FLORA Pena.
behavior

## 2020-04-26 NOTE — NUR
ASSUME CARE 1900. PT/VITALS STABLE. BP RUNS HIGH AT TIMES. INTERMITEENT PAIN
IN BACK AND ANKLES. POORLY COMMUNICATES NEEDS. POOR TOLERANCE TO ACTIVITY.
ASSESSMETN AS CHARTED. PROGRESSING SLOWLY WITH POC. REST NOTED WITH NO
DISTRESS. PLAN IS TO CONTINUE WITH ABX AND MONITOR LEVEL OF CONCIOUSNESS. WILL
CONTINUE TO MONITOR AND FOLLOW WITH POC

## 2020-04-26 NOTE — NUR
PT CARE ASSUMED AT 1200. A&Ox1 TO SELF. PT SOUNDED WHEEZY AND PUT ON 3L. AFTER
AN HOUR BACK DOWN TO 2L BY RT. INCONTINENT TO B&B. MEDS GIVEN CRUSHED WITH A
SPOON OF PUDDING IF ANY OTHER WAY SHE WILL BE COMBATITIVE. NSR WITH BOUTS OF
SINUS TACHY. HYDRALIZINE ON BOARD FOR SBP OVER 160 THIS WAS GIVEN TWICE DURING
THIS SHIFT. BILATERAL LOWER EDEMA +3. IV R. BREAST WITH NO REDNESS OR EDEMA.
SALINE LOCKED. LOW GRADE TEMP RESOLVED. HALDOL PRN GIVEN AT 1800. NO AGITATION
FROM 1200 TO 1800. ACHS WITH NO COVERAGE NEEDED TODAY. PT ON ELECTROLYTE
PROTOCOL NOW. K 3.3 TREATED PER PROTOCOL AND RESOLVED WITH REDRAW. MAG 1.1 2
BAGS OF MAG GIVEN AWAITING REDRAW RESULTS. DAILY WEIGHT (CHF) PT IS HOME WITH
SON BUT WILL NEED TO DO SKILLED OR FACILITY AFTER DISCHARGE. LUNGS CLEAR ,
DIMINISHED. CALL LIGHT IN RREACH. ALL 4 BED RAILS UP FOR PT SAFETY. Q1
ROUNDING. FALL PROTOCOL IN PLACE. WILL CONTINUE TO MONITOR. VOLTAREN GEL
APPLIED FOR PAIN ON BACK AND LEGS.

## 2020-04-26 NOTE — NUR
PT CARE ASSUMED AT 1200. A&Ox4. PT UP AT MAXX. SLEEPING MOST OF THE AFTERNOON.
VITALS STABLE. RUNNING NSR WITH BB. IV PATENT WITH NO REDNESS OR EDEMA, SALINE
LOCKED. DAILY WEIGHT AT 0500. HAD 2 LOOSE STOOLS TODAY. PT STATED THAT HE IS
LACTOSE INTOLERENT AND DIET WAS CHANGED. FLUID RESTRICTIONS OF 1500ML. CALL
CASE MANAGMENT WHEN PT GETS DC SO THEY CAN TAKE HIS SCRIPTS TO THE PHARMACY
AND FILL A 30 DAY SUPPLY FOR THE PT. WILL CONTINUE TO MONITOR. CALL LIGHT IN
REACH.

## 2020-04-27 VITALS — SYSTOLIC BLOOD PRESSURE: 141 MMHG | DIASTOLIC BLOOD PRESSURE: 54 MMHG

## 2020-04-27 VITALS — SYSTOLIC BLOOD PRESSURE: 182 MMHG | DIASTOLIC BLOOD PRESSURE: 63 MMHG

## 2020-04-27 VITALS — SYSTOLIC BLOOD PRESSURE: 155 MMHG | DIASTOLIC BLOOD PRESSURE: 58 MMHG

## 2020-04-27 NOTE — NUR
PATIENT ALERT BUT CONFUSED.SPITS ALL HER MEDS LAST NIGHT AND REFUSED HER MEDS
THIS MORNING.TRIED TO TAKE OFF HER O2 AND SOUNDS WHEEZY.BREATHING TREATMENT
GIVEN BY RT.MONITOR SHOWS ST.POC CONTINUED.

## 2020-04-27 NOTE — NUR
FAXED REFERRAL TO [facility] SPOKE WITH JUAN A IN ADM SHE RECEIVED
REFERRAL AND WILL REVIEW. DP TO FOLLOW.

## 2020-04-27 NOTE — NUR
RECEIVED PT'S CARE AROUND 0710; PT. RESTING WITH EYES CLOSED; EQUAL CHEST
RISING NOTICED; SR ON THE MONITOR; DURING AM ASSESSMENT PT. ALERT TO PERSON;
NO APPARENT PAIN; RESTLESS; UNCOOPERATIVE; NEW IV STARTED BY IV TEAM; REFUSED
BREAKFAST & LUNCH; ABLE TO HAVE AM MEDICATION AFTER WORKING FOR 45 MINS WITH
PT.; PHYSICIAN NOTIFIED; PRN HALDOL GIVEN; ABLE TO SWALLOW PILLS; AROUND 1400
BS 55; PRN IV MEDICATION GIVEN; PHYSICIAN NOTIFIED; NO NEW ORDERS; DURING
DINNER PT. ATE ABOUT 10%; PHYSICIAN NOTIFIED; ORDERS RECEIVED; SR-ST ON THE
MONITOR; ASSESSMENT AS CHARGED; FOLLOWING POC; WILL PASS ON REPORT;

## 2020-04-27 NOTE — NUR
Patient with confusion. Sp with hospitalist who reports needs for post acute
care. Hospitalist reports patient unable to tolerate acute rehab. Sp with son
Brooks and discussed. Plan referral to [facility] to review for skilled
care.

## 2020-04-27 NOTE — NUR
FAXED REFERRAL TO [facility] SPOKE WITH SOFIA IN ADM SHE RECEIVED
REFERRAL AND WILL REVIEW. DP TO FOLLOW.

## 2020-04-28 VITALS — DIASTOLIC BLOOD PRESSURE: 78 MMHG | SYSTOLIC BLOOD PRESSURE: 111 MMHG

## 2020-04-28 VITALS — DIASTOLIC BLOOD PRESSURE: 71 MMHG | SYSTOLIC BLOOD PRESSURE: 163 MMHG

## 2020-04-28 VITALS — DIASTOLIC BLOOD PRESSURE: 118 MMHG | SYSTOLIC BLOOD PRESSURE: 131 MMHG

## 2020-04-28 VITALS — DIASTOLIC BLOOD PRESSURE: 85 MMHG | SYSTOLIC BLOOD PRESSURE: 145 MMHG

## 2020-04-28 VITALS — SYSTOLIC BLOOD PRESSURE: 145 MMHG | DIASTOLIC BLOOD PRESSURE: 76 MMHG

## 2020-04-28 LAB
ANION GAP SERPL CALC-SCNC: 9 MMOL/L (ref 7–16)
BUN SERPL-MCNC: 5 MG/DL (ref 7–18)
CALCIUM SERPL-MCNC: 9.3 MG/DL (ref 8.5–10.1)
CHLORIDE SERPL-SCNC: 94 MMOL/L (ref 98–107)
CO2 SERPL-SCNC: 30 MMOL/L (ref 21–32)
CREAT SERPL-MCNC: 0.6 MG/DL (ref 0.6–1)
GLUCOSE SERPL-MCNC: 134 MG/DL (ref 74–106)
POTASSIUM SERPL-SCNC: 3.2 MMOL/L (ref 3.5–5.1)
SODIUM SERPL-SCNC: 133 MMOL/L (ref 136–145)

## 2020-04-28 NOTE — NUR
Received asleep on bed, easily rousable then goes back to sleep. Due
medications given as prescribed, crushed and mixed with apple sauce. A+O to
self, confused, may be combative at times. On heart monitoring- SR-ST; no
complaints of chest pain, crushing and heaviness sensation. With O2 at 2lpm
via nasal cannula. On carb controlled diet- pt refused breakfast this am,
physician informed; encouraged and assisted in eating and drinking. On blood
sugar monitoring- taken and recorded; with sliding scale insulin ordered-
given as prescribed. With external female madrigal in place- output measured and
recorded accordingly; checked frequently and changed as needed with on and
off incontinence.
with D5 at 75cc/hr, infusing well at R FA, wrapped with coban. With bruises
on her upper and lower extremities. With generalized edema, non weeping noted.
Assisted in ADLs. Still a/w placement. With orders to shift IVF from D5 to NS
+ 20meq KCL- IVF shifted.
To continue monitoring patient.

## 2020-04-28 NOTE — NUR
[facility] not accepting for post acute care. Sp with son and referral
to [facility]. They are able to accept once stable and bed avail. Called son and left
message to discuss.

## 2020-04-28 NOTE — NUR
pt restless and uncooperative at start of shift repositioned and let rest,
crushed meds in pudding and after much encouragement pt swallowed pills,
refused and liquids, no c/o pain , vss except bp elevated and prn hydralizine
given, pt alert to self and usually will follow directions, will con't to
monitor per ppoc.

## 2020-04-29 VITALS — DIASTOLIC BLOOD PRESSURE: 61 MMHG | SYSTOLIC BLOOD PRESSURE: 116 MMHG

## 2020-04-29 VITALS — SYSTOLIC BLOOD PRESSURE: 150 MMHG | DIASTOLIC BLOOD PRESSURE: 107 MMHG

## 2020-04-29 VITALS — SYSTOLIC BLOOD PRESSURE: 155 MMHG | DIASTOLIC BLOOD PRESSURE: 86 MMHG

## 2020-04-29 VITALS — SYSTOLIC BLOOD PRESSURE: 104 MMHG | DIASTOLIC BLOOD PRESSURE: 80 MMHG

## 2020-04-29 LAB
ANION GAP SERPL CALC-SCNC: 5 MMOL/L (ref 7–16)
BILIRUB UR-MCNC: (no result) MG/DL
BUN SERPL-MCNC: 5 MG/DL (ref 7–18)
CALCIUM SERPL-MCNC: 8.9 MG/DL (ref 8.5–10.1)
CHLORIDE SERPL-SCNC: 95 MMOL/L (ref 98–107)
CO2 SERPL-SCNC: 29 MMOL/L (ref 21–32)
COLOR UR: (no result)
CREAT SERPL-MCNC: 0.6 MG/DL (ref 0.6–1)
ERYTHROCYTE [DISTWIDTH] IN BLOOD BY AUTOMATED COUNT: 15.5 % (ref 10.5–14.5)
GLUCOSE SERPL-MCNC: 98 MG/DL (ref 74–106)
HCT VFR BLD CALC: 30.5 % (ref 37–47)
HGB BLD-MCNC: 9.9 GM/DL (ref 12–15)
KETONES UR STRIP-MCNC: (no result) MG/DL
MAGNESIUM SERPL-MCNC: 1.4 MG/DL (ref 1.8–2.4)
MCH RBC QN AUTO: 27.2 PG (ref 26–34)
MCHC RBC AUTO-ENTMCNC: 32.4 G/DL (ref 28–37)
MCV RBC: 84.1 FL (ref 80–100)
NITRITE UR QL STRIP: NEGATIVE
PLATELET # BLD: 172 THOU/UL (ref 150–400)
POTASSIUM SERPL-SCNC: 3.4 MMOL/L (ref 3.5–5.1)
RBC # BLD AUTO: 3.62 MIL/UL (ref 4.2–5)
RBC # UR STRIP: NEGATIVE /UL
SODIUM SERPL-SCNC: 129 MMOL/L (ref 136–145)
SP GR UR STRIP: 1.02 (ref 1–1.03)
URINE CLARITY: CLEAR
URINE GLUCOSE-RANDOM*: NEGATIVE
URINE LEUKOCYTES: NEGATIVE
URINE PROTEIN (DIPSTICK): NEGATIVE
UROBILINOGEN UR STRIP-ACNC: 0.2 E.U./DL (ref 0.2–1)
WBC # BLD AUTO: 6.4 THOU/UL (ref 4–11)

## 2020-04-29 NOTE — NUR
Received awake on bed. A+O to self only, confused. On O2 at 2lpm via nasal
cannula. On heart monitoring- running SR-ST; no complaints of chest pain,
crushing sensation and heaviness, no visual signs of noted as well. On carb
controlled diet- tried feedng pt but clenchs her teeth and pushes me away
from her; encouraged and assisted in eating and drinking. Due medications
given as prscribed, crushed and mixed with apple sauce. On blood sugar
monitoring, taken and recorded accordingly; with sliding scale insulin
ordered- given as prescribed. With external madrigal in place- output measured
and recorded accordingly; checked regularly and changed as needed.
Assisted in ADLs. Dr. Wilson informed re: increased confusion, refusing to
eat and with observed tremors on upper then lower extremities this morning- to
informed Dr Martin/Dr Palacios. With NS + Kcl at 80cc/hr, infusing well at R AC-
wrapped in Coban, pt has tendency to pull IV off. Still a/w placement.
To continue monitoring patient.

## 2020-04-29 NOTE — NUR
ASSUMED PATIENT CARE AT 1845. VITAL SIGNS STABLE WITH NURSE NOT PERCEIVING ANY
PAIN OR NAUSEA ON BEHALF OF PATIENT. ORIENTED TO SELF ONLY, PATIENT IS UNABLE
TO CALL APPROPRIATELY OR ACTIVELY PARTICIPATE IN CARE. BREATHING STABLE AS
EVIDENCED BY ASSESSMENT AND SPOT OXYGENATION CHECKS. PATIENT TURNED FREQUENTLY
WITH SKIN CARE PROVIDED. CONTINUE PLAN OF CARE.

## 2020-04-29 NOTE — NUR
Attempted to contact Steven Ayala. Left message yesterday and today. Usually
leave message and he returns call to unit. He has not returned call as of yet.

## 2020-04-30 VITALS — SYSTOLIC BLOOD PRESSURE: 157 MMHG | DIASTOLIC BLOOD PRESSURE: 115 MMHG

## 2020-04-30 VITALS — DIASTOLIC BLOOD PRESSURE: 54 MMHG | SYSTOLIC BLOOD PRESSURE: 147 MMHG

## 2020-04-30 VITALS — SYSTOLIC BLOOD PRESSURE: 132 MMHG | DIASTOLIC BLOOD PRESSURE: 72 MMHG

## 2020-04-30 VITALS — SYSTOLIC BLOOD PRESSURE: 110 MMHG | DIASTOLIC BLOOD PRESSURE: 60 MMHG

## 2020-04-30 VITALS — SYSTOLIC BLOOD PRESSURE: 133 MMHG | DIASTOLIC BLOOD PRESSURE: 81 MMHG

## 2020-04-30 VITALS — DIASTOLIC BLOOD PRESSURE: 38 MMHG | SYSTOLIC BLOOD PRESSURE: 142 MMHG

## 2020-04-30 VITALS — DIASTOLIC BLOOD PRESSURE: 60 MMHG | SYSTOLIC BLOOD PRESSURE: 137 MMHG

## 2020-04-30 LAB
ANION GAP SERPL CALC-SCNC: 10 MMOL/L (ref 7–16)
BUN SERPL-MCNC: 5 MG/DL (ref 7–18)
CALCIUM SERPL-MCNC: 8.5 MG/DL (ref 8.5–10.1)
CHLORIDE SERPL-SCNC: 101 MMOL/L (ref 98–107)
CO2 SERPL-SCNC: 25 MMOL/L (ref 21–32)
CREAT SERPL-MCNC: 0.4 MG/DL (ref 0.6–1)
ERYTHROCYTE [DISTWIDTH] IN BLOOD BY AUTOMATED COUNT: 15.9 % (ref 10.5–14.5)
GLUCOSE SERPL-MCNC: 85 MG/DL (ref 74–106)
HCT VFR BLD CALC: 28.8 % (ref 37–47)
HGB BLD-MCNC: 9.3 GM/DL (ref 12–15)
MAGNESIUM SERPL-MCNC: 1.4 MG/DL (ref 1.8–2.4)
MCH RBC QN AUTO: 27.4 PG (ref 26–34)
MCHC RBC AUTO-ENTMCNC: 32.2 G/DL (ref 28–37)
MCV RBC: 85 FL (ref 80–100)
PLATELET # BLD: 170 THOU/UL (ref 150–400)
POTASSIUM SERPL-SCNC: 4.3 MMOL/L (ref 3.5–5.1)
RBC # BLD AUTO: 3.39 MIL/UL (ref 4.2–5)
SODIUM SERPL-SCNC: 136 MMOL/L (ref 136–145)
WBC # BLD AUTO: 6.5 THOU/UL (ref 4–11)

## 2020-04-30 NOTE — NUR
Pt had MRI this am and results are pending. Neuro was able to talk with the
pt's son and dtr in law. They will advise if they wish to have LP done as well
for neuro workup. [facility] has evaluated and awaiting additional imput
regarding her plan of care before making a decision. Will follow.

## 2020-04-30 NOTE — NUR
ASSUMED CARE 0700. ORRIENT TO SELF ONLY. LOW BS TREATED WITH DEXTROSE 10%.
POOR NUTRIONAL INTAKE. ST CHANGED DIET TO PUREE AND NECTOR LIQUIDS C ASSIT
WITH FEEDING. CONFUSION NOT IMPROVING. GENERAL EDEMA SLOW TO IMPROVE. MRI
COMPLETED C NO ACUTE PROCESS. BECOMES FUSSY AND TELLS 'STOP" WHEN BOTHERED,
WILL STATE SHE IS READY TO GO HOME. MAX ASSISTANCE. DENIES PAIN, DENIES
SOB. FALL PRECAUTION IN PLACE. CLOSE TO NURSE STATION FOR OVERSITE. STAFF TO
ANTCICPATE NEEDS.

## 2020-04-30 NOTE — NUR
ASSUMED PT CARE AROUND 0000. LETHARGIC BUT AROUSABLE AND REACTS TO PAINFUL
STIMULI. VSS. NO S/S ACUTE DISTRESS NOTED OR REPORTED AT THIS TIME. WILL CONT
TO MONITOR FOR ANY CHANGES IN CONDITION.

## 2020-05-01 VITALS — DIASTOLIC BLOOD PRESSURE: 61 MMHG | SYSTOLIC BLOOD PRESSURE: 129 MMHG

## 2020-05-01 VITALS — SYSTOLIC BLOOD PRESSURE: 142 MMHG | DIASTOLIC BLOOD PRESSURE: 64 MMHG

## 2020-05-01 VITALS — SYSTOLIC BLOOD PRESSURE: 173 MMHG | DIASTOLIC BLOOD PRESSURE: 70 MMHG

## 2020-05-01 VITALS — DIASTOLIC BLOOD PRESSURE: 57 MMHG | SYSTOLIC BLOOD PRESSURE: 159 MMHG

## 2020-05-01 VITALS — DIASTOLIC BLOOD PRESSURE: 50 MMHG | SYSTOLIC BLOOD PRESSURE: 143 MMHG

## 2020-05-01 VITALS — DIASTOLIC BLOOD PRESSURE: 70 MMHG | SYSTOLIC BLOOD PRESSURE: 151 MMHG

## 2020-05-01 VITALS — DIASTOLIC BLOOD PRESSURE: 73 MMHG | SYSTOLIC BLOOD PRESSURE: 150 MMHG

## 2020-05-01 VITALS — SYSTOLIC BLOOD PRESSURE: 144 MMHG | DIASTOLIC BLOOD PRESSURE: 52 MMHG

## 2020-05-01 VITALS — SYSTOLIC BLOOD PRESSURE: 141 MMHG | DIASTOLIC BLOOD PRESSURE: 53 MMHG

## 2020-05-01 VITALS — DIASTOLIC BLOOD PRESSURE: 83 MMHG | SYSTOLIC BLOOD PRESSURE: 106 MMHG

## 2020-05-01 VITALS — DIASTOLIC BLOOD PRESSURE: 4 MMHG | SYSTOLIC BLOOD PRESSURE: 169 MMHG

## 2020-05-01 LAB
ANION GAP SERPL CALC-SCNC: 6 MMOL/L (ref 7–16)
BUN SERPL-MCNC: 4 MG/DL (ref 7–18)
CALCIUM SERPL-MCNC: 9 MG/DL (ref 8.5–10.1)
CHLORIDE SERPL-SCNC: 103 MMOL/L (ref 98–107)
CLARITY CSF: CLEAR
CO2 SERPL-SCNC: 29 MMOL/L (ref 21–32)
COLOR CSF: COLORLESS
CREAT SERPL-MCNC: 0.5 MG/DL (ref 0.6–1)
CSF RBC: 705 /MM3
CSF WBC: 9 /MM3 (ref 0–10)
ERYTHROCYTE [DISTWIDTH] IN BLOOD BY AUTOMATED COUNT: 15.6 % (ref 10.5–14.5)
GLUCOSE CSF-MCNC: 84 MG/DL (ref 40–70)
GLUCOSE SERPL-MCNC: 129 MG/DL (ref 74–106)
HCT VFR BLD CALC: 29.1 % (ref 37–47)
HGB BLD-MCNC: 9.3 GM/DL (ref 12–15)
INR PPP: 1.1
MAGNESIUM SERPL-MCNC: 1.6 MG/DL (ref 1.8–2.4)
MCH RBC QN AUTO: 26.9 PG (ref 26–34)
MCHC RBC AUTO-ENTMCNC: 31.9 G/DL (ref 28–37)
MCV RBC: 84.3 FL (ref 80–100)
PLATELET # BLD: 178 THOU/UL (ref 150–400)
POTASSIUM SERPL-SCNC: 3.8 MMOL/L (ref 3.5–5.1)
PROT CSF-MCNC: 41 MG/DL (ref 15–45)
PROTHROMBIN TIME: 11.5 SECONDS (ref 9.3–11.4)
RBC # BLD AUTO: 3.45 MIL/UL (ref 4.2–5)
SODIUM SERPL-SCNC: 138 MMOL/L (ref 136–145)
SPECIMEN VOL 24H UR: 8 ML
WBC # BLD AUTO: 6 THOU/UL (ref 4–11)

## 2020-05-01 PROCEDURE — B01B1ZZ FLUOROSCOPY OF SPINAL CORD USING LOW OSMOLAR CONTRAST: ICD-10-PCS | Performed by: RADIOLOGY

## 2020-05-01 PROCEDURE — 009U3ZX DRAINAGE OF SPINAL CANAL, PERCUTANEOUS APPROACH, DIAGNOSTIC: ICD-10-PCS | Performed by: RADIOLOGY

## 2020-05-01 NOTE — NUR
PATIENT ASSESSED AND IS ALERT X 1. SKIN WARM AND DRY. RESP EVEN AND UNLABORED.
PATIENT IS M/S TELE. TEL- SHOWS NSR WITH 1ST DEGREE AV BLOCK. DENIES ANY
PAIN.TURNS BUT REFUSED UNTIL 0130. EDEMA NOTED REMAINS ON BEDREST.HAS
MULTIPLE BRUSING ALL OVER. IS WITHDRAWN AND CONFUSED. CRUSHED MED AND SWOLLOWS
WELL. QIU CATH PATENT. HAS GENERAL EDEMA  NOTED 2+. IV SITE HEALTHY AND HAS
FLUIDS INFUSING WELL. DID  TAKE HER MEDS TONIGHT. BS 98. HAS WEAKNESS NOTED.
TAKES NECTAR THICKEN FLUIDS. HAS RIGHT FA HEALTHY LOOKING. REMAINS AMS. CONT
PLAN OF CARE.

## 2020-05-01 NOTE — NUR
Nutrition: PO intake minimal/poor > 1 week. Dsyphagia with modified diet/100%
assist required. Nsg reports pt clamps down on spoon frequently. S/P lumbar
puncture. May consider changing IVFs to Clinimix PPN until po improves or long
term nutrition plan determined.

## 2020-05-01 NOTE — NUR
Pt getting LP today. Mental status a little better per nursing. Nursing has
spoken with pt's dtr in law Isabelle and samir Dorsey. Pt's son Steven FERRELL at work.
Update given to [facility]. No weekend dc anticipated. [facility] will need updated on
Monday and to check their bed status.

## 2020-05-01 NOTE — NUR
ASSUMED CARE 0700. ALERT X2 MORE AWAKE TODAY AND TALKATIVE. SCHEDULED SPINAL
TAB/ LUMBAR PUNCTURE COMPLETED
REQUIRED A 2 NURSE VERBAL SPOKE WITH RAZ EULA PT'S NIECE
AND ROBBIE PT'S DAUGHTER IN LAW WHOSE SON IS THE DPOA WHO WAS AWARE OF THE
NEED TO SPINAL. DR EDWARDS SPOKE WITH DIEGO CRANDALL ON THE PHONE TO UPDATE PT'S
STATUS. RESULTS PENDING. FOLLOWING POST PUCTURE CARE PER ORDERS.
TREATED ELEVATE BM AND PAIN WITH PRN MEDICATIONS. ONE TIME LASIX GIVEN. DR KAPLAN NOTIFIED OF BM ELEVATIONS.
PT VOIDING IMPROVED FROM YESTERDAY GREATER THEN 1200CC AT THIS TIME.
NO PLANS TO DC AT THIS TIME.  PT UNABLE TO DEMONSTRATE CALL LIGHT USE. STAFF
TO ANTICIPATE NEEDS. BED ALARM SET

## 2020-05-02 VITALS — DIASTOLIC BLOOD PRESSURE: 72 MMHG | SYSTOLIC BLOOD PRESSURE: 145 MMHG

## 2020-05-02 VITALS — SYSTOLIC BLOOD PRESSURE: 108 MMHG | DIASTOLIC BLOOD PRESSURE: 56 MMHG

## 2020-05-02 VITALS — DIASTOLIC BLOOD PRESSURE: 58 MMHG | SYSTOLIC BLOOD PRESSURE: 146 MMHG

## 2020-05-02 VITALS — SYSTOLIC BLOOD PRESSURE: 142 MMHG | DIASTOLIC BLOOD PRESSURE: 72 MMHG

## 2020-05-02 VITALS — SYSTOLIC BLOOD PRESSURE: 139 MMHG | DIASTOLIC BLOOD PRESSURE: 51 MMHG

## 2020-05-02 LAB
ALBUMIN SERPL-MCNC: 2.2 G/DL (ref 3.4–5)
ALT SERPL-CCNC: 14 U/L (ref 30–65)
ANION GAP SERPL CALC-SCNC: 5 MMOL/L (ref 7–16)
AST SERPL-CCNC: 26 U/L (ref 15–37)
BILIRUB DIRECT SERPL-MCNC: 0.1 MG/DL
BILIRUB SERPL-MCNC: 0.5 MG/DL
BUN SERPL-MCNC: 8 MG/DL (ref 7–18)
CALCIUM SERPL-MCNC: 9.2 MG/DL (ref 8.5–10.1)
CHLORIDE SERPL-SCNC: 99 MMOL/L (ref 98–107)
CO2 SERPL-SCNC: 32 MMOL/L (ref 21–32)
CREAT SERPL-MCNC: 0.6 MG/DL (ref 0.6–1)
ERYTHROCYTE [DISTWIDTH] IN BLOOD BY AUTOMATED COUNT: 15.7 % (ref 10.5–14.5)
GLUCOSE SERPL-MCNC: 128 MG/DL (ref 74–106)
HCT VFR BLD CALC: 28.3 % (ref 37–47)
HGB BLD-MCNC: 9.3 GM/DL (ref 12–15)
MAGNESIUM SERPL-MCNC: 1.8 MG/DL (ref 1.8–2.4)
MCH RBC QN AUTO: 27.9 PG (ref 26–34)
MCHC RBC AUTO-ENTMCNC: 33.1 G/DL (ref 28–37)
MCV RBC: 84.6 FL (ref 80–100)
PLATELET # BLD: 205 THOU/UL (ref 150–400)
POTASSIUM SERPL-SCNC: 3.4 MMOL/L (ref 3.5–5.1)
PROT SERPL-MCNC: 5.8 G/DL (ref 6.4–8.2)
RBC # BLD AUTO: 3.35 MIL/UL (ref 4.2–5)
SODIUM SERPL-SCNC: 136 MMOL/L (ref 136–145)
WBC # BLD AUTO: 7.3 THOU/UL (ref 4–11)

## 2020-05-02 NOTE — NUR
ASSUMED CARE 0700. ALERT X3 WITH FORGETFULNESS, VOICED SHE IS AFRAID HER
FAMILY DOES NOT KNOW SHE IS AT THE HOSPITAL. NEUROLOGY ROUNDED STATED  PT HAS
DEMENCIA. PRIMARY DOCTOR ROSAURA ORDERED MEDICATIONS FOR CONSTIPATION. NO BM
NOTED AT THIS TIME. NRS ON TELE. COMPIANT WITH CARES. TURNS Q2H AS TOLERATED.
NYSTATIN POWDER PLACED UNDER BREAST FOLDS. FALL PRECAUTIONS IN PLACE. CALL
LIGHT IN REACH. STAFF TO ANTICIPATE NEEDS.

## 2020-05-02 NOTE — NUR
Assumed pt care at 1900. Pt is alert and confused. Pt stated that she wanted
to talk to her family. RN spoke to pt's son who said that pt called stating
that she was ready to go home. Pt is stable. Denies any pain. Assessment
completed and documented. Scheduled meds administered to pt. Tolerated PO
intake. Pt sleeps through the night.Turns completed. Continue to monitor pt.
Denies any further needs at this time.

## 2020-05-03 VITALS — DIASTOLIC BLOOD PRESSURE: 64 MMHG | SYSTOLIC BLOOD PRESSURE: 154 MMHG

## 2020-05-03 VITALS — DIASTOLIC BLOOD PRESSURE: 75 MMHG | SYSTOLIC BLOOD PRESSURE: 156 MMHG

## 2020-05-03 VITALS — DIASTOLIC BLOOD PRESSURE: 65 MMHG | SYSTOLIC BLOOD PRESSURE: 153 MMHG

## 2020-05-03 VITALS — SYSTOLIC BLOOD PRESSURE: 152 MMHG | DIASTOLIC BLOOD PRESSURE: 71 MMHG

## 2020-05-03 VITALS — SYSTOLIC BLOOD PRESSURE: 136 MMHG | DIASTOLIC BLOOD PRESSURE: 58 MMHG

## 2020-05-03 VITALS — SYSTOLIC BLOOD PRESSURE: 156 MMHG | DIASTOLIC BLOOD PRESSURE: 75 MMHG

## 2020-05-03 LAB
ANION GAP SERPL CALC-SCNC: 2 MMOL/L (ref 7–16)
BUN SERPL-MCNC: 7 MG/DL (ref 7–18)
CALCIUM SERPL-MCNC: 9.2 MG/DL (ref 8.5–10.1)
CHLORIDE SERPL-SCNC: 102 MMOL/L (ref 98–107)
CO2 SERPL-SCNC: 33 MMOL/L (ref 21–32)
CREAT SERPL-MCNC: 0.5 MG/DL (ref 0.6–1)
GLUCOSE SERPL-MCNC: 128 MG/DL (ref 74–106)
MAGNESIUM SERPL-MCNC: 1.5 MG/DL (ref 1.8–2.4)
POTASSIUM SERPL-SCNC: 3.7 MMOL/L (ref 3.5–5.1)
SODIUM SERPL-SCNC: 137 MMOL/L (ref 136–145)

## 2020-05-03 NOTE — NUR
PT CARE ASSUMED AT SHIFT CHANGE. PT ASSESSMENTS AS CHARTED. PT MEDICATIONS AS
CHARTED. PT IS RESPONSIVE. PT HAS POOR APPETITE, BUT DOES DRINK WHATS OFFERED.
PT COMPLAINS OF CONSTIPATION, BUT DOES HAVE SMALL LOOSE BM'S. PT PULLED RAC
IV, IV THERAPY PLACED LT WRIST IV, WRAPPED.

## 2020-05-03 NOTE — NUR
ASSUMED PT CARE AT 1900. PT IS VERY DROWSY AND SLEEPING. NO SIGN OF DISTRESS
NOTED. PT IS AROUSABLE TO COMMAND, ORIENTED TO SELF. NO SIGN OF DISTRESS
NOTED IN PT. SCHEDULED MEDS ADMINISTERE TO PT. TOLERTED PO INTAKE. PT HAD
BOWEL MOVEMENT. CONTINUE TO MONITOR PT, DENIES ANY FURTHER NEEDS AT THIS TIME.

## 2020-05-04 VITALS — SYSTOLIC BLOOD PRESSURE: 154 MMHG | DIASTOLIC BLOOD PRESSURE: 69 MMHG

## 2020-05-04 VITALS — DIASTOLIC BLOOD PRESSURE: 69 MMHG | SYSTOLIC BLOOD PRESSURE: 154 MMHG

## 2020-05-04 VITALS — DIASTOLIC BLOOD PRESSURE: 82 MMHG | SYSTOLIC BLOOD PRESSURE: 144 MMHG

## 2020-05-04 VITALS — DIASTOLIC BLOOD PRESSURE: 72 MMHG | SYSTOLIC BLOOD PRESSURE: 156 MMHG

## 2020-05-04 VITALS — DIASTOLIC BLOOD PRESSURE: 71 MMHG | SYSTOLIC BLOOD PRESSURE: 154 MMHG

## 2020-05-04 NOTE — NUR
Spoke with patient via phone and son Steven. Patient appears more alert and
able to converse regarding dc planning. Patient has been at [facility] in past and
agreeable to [facility] eval. Patient may now be able to tolerate acute rehab. Therapy
evals in process. [facility] plan if unable to be accepted to [facility].

## 2020-05-04 NOTE — PATH
[facility]
Kiran Puga [facility] [place]   [address]                     PATHOLOGY RPT PROCEDURE       
_______________________________________________________________________________
 
Name:       TERENCE BUCIO              Room #:         216-P       ADM IN  
M.R.#:      [id number]     Account #:      [id number]  
Admission:  04/21/20    Date of Birth:  [birth year]  
Discharge:                                              Report #:    [id number]
                                                        Path Case #: [id number]
_______________________________________________________________________________
Note
[facility] Accession Number: [id number]
   TESTS               RESULT  FLAG  UNITS    REF RANGE  LAB
------------------------------------------------------------
   Clinician Provided Cytology Information
   No. of containers..01 Other (Miscellaneous)
Source:                                                   01
   CSF
DIAGNOSIS:                                                02
   CSF
   NEGATIVE FOR MALIGNANT EPITHELIAL CELLS.
   SCANT CELLULARITY.
   RARE LYMPHOCYTES IDENTIFIED.
   NEGATIVE FOR VIRAL INCLUSIONS OR PARASITIC ORGANISMS.
Pathologist ICD10:                                        02
   R41.82
Signed out by:                                            02
   Vandana Bonilla MD, Pathologist
   NPI- [id number]
Performed by:                                             01
   Claudia Chavez, Cytotechnologist ([facility])
Gross description:                                        01
   2 ML, CLEAR COLORLESS, 1 TP
   /LCS  05/01/2020  1651 Local
 
------------------------------------------------------------
    FLAG LEGEND:
    L-Low Normal,H-High Normal,LL-Alert Low,HH-Alert High
    <-Panic Low,>-Panic High,A-Abnormal,AA-Critical Abnormal
------------------------------------------------------------
 
Performed at:
01 [address]
   [place]  [address]
   Khurram Pierce MD, Phone: [phone]
[address]  [address]
   Vandana Bonilla MD, Phone: [phone]
***Performed at:  01
   [address], [place], [place]  [id number]
   MD Khurram Pierce MD Phone:  [phone]

## 2020-05-04 NOTE — NUR
ASSUMED PT CARE AT 1900,PT IS AWAKE ALERT AND ORIENTEDX3, MAKES NEEDS KNOWN,
SR ON THE MONITOR, COMPLAINED OF PAIN ON HER LOWER BACK, MEDICATED WITH
DICLOFENAC AND TYL PRN WITH PARTIAL RELIEF, TOOK MEDICATION AS ORDERED, VSS,
ASSESSMENTS AS CHARTED, PLEASANT AND RESTING IN BED, NO DISTRESS NOTED, WILL
CONTINUE TO MONITOR

## 2020-05-04 NOTE — NUR
PT CARE ASSUMED AT 0700. ASSESSMENTS AS CHARTED. MEDICATION AS CHARTED. PT
MENTAL STATUS HAS IMPROVED. AO X 4. KUB COMPLETE, NO OBTRUCTION. PT CONTINUES
TO HAVE LOOSE STOOLS. PT APPETITE IS STILL POOR. PT TO CHAIR FOR TWO HOURS,
MADE IT BACK TO BED WITH ASSIST X 1 AND WALKER. PT COMPLAINS OF LOWER BACK
PAIN.

## 2020-05-05 ENCOUNTER — HOSPITAL ENCOUNTER (INPATIENT)
Dept: HOSPITAL 35 - REHABU | Age: 75
LOS: 15 days | Discharge: HOME HEALTH | DRG: 92 | End: 2020-05-20
Attending: PHYSICAL MEDICINE & REHABILITATION | Admitting: PHYSICAL MEDICINE & REHABILITATION
Payer: COMMERCIAL

## 2020-05-05 VITALS — SYSTOLIC BLOOD PRESSURE: 174 MMHG | DIASTOLIC BLOOD PRESSURE: 71 MMHG

## 2020-05-05 VITALS — DIASTOLIC BLOOD PRESSURE: 72 MMHG | SYSTOLIC BLOOD PRESSURE: 137 MMHG

## 2020-05-05 VITALS — BODY MASS INDEX: 45.24 KG/M2 | HEIGHT: 62.01 IN | WEIGHT: 249 LBS

## 2020-05-05 VITALS — DIASTOLIC BLOOD PRESSURE: 66 MMHG | SYSTOLIC BLOOD PRESSURE: 150 MMHG

## 2020-05-05 VITALS — DIASTOLIC BLOOD PRESSURE: 72 MMHG | SYSTOLIC BLOOD PRESSURE: 178 MMHG

## 2020-05-05 VITALS — DIASTOLIC BLOOD PRESSURE: 76 MMHG | SYSTOLIC BLOOD PRESSURE: 155 MMHG

## 2020-05-05 DIAGNOSIS — I10: ICD-10-CM

## 2020-05-05 DIAGNOSIS — R41.0: ICD-10-CM

## 2020-05-05 DIAGNOSIS — M54.9: ICD-10-CM

## 2020-05-05 DIAGNOSIS — N39.0: ICD-10-CM

## 2020-05-05 DIAGNOSIS — F01.50: ICD-10-CM

## 2020-05-05 DIAGNOSIS — E44.0: ICD-10-CM

## 2020-05-05 DIAGNOSIS — G47.00: ICD-10-CM

## 2020-05-05 DIAGNOSIS — B96.89: ICD-10-CM

## 2020-05-05 DIAGNOSIS — G92: Primary | ICD-10-CM

## 2020-05-05 DIAGNOSIS — R13.10: ICD-10-CM
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
## 2020-05-05 DIAGNOSIS — I77.6: ICD-10-CM

## 2020-05-05 DIAGNOSIS — Z79.82: ICD-10-CM

## 2020-05-05 DIAGNOSIS — F41.9: ICD-10-CM

## 2020-05-05 DIAGNOSIS — E55.9: ICD-10-CM

## 2020-05-05 DIAGNOSIS — G89.29: ICD-10-CM

## 2020-05-05 DIAGNOSIS — Z79.899: ICD-10-CM

## 2020-05-05 DIAGNOSIS — F39: ICD-10-CM

## 2020-05-05 DIAGNOSIS — F32.9: ICD-10-CM

## 2020-05-05 DIAGNOSIS — B37.9: ICD-10-CM

## 2020-05-05 DIAGNOSIS — E66.09: ICD-10-CM

## 2020-05-05 DIAGNOSIS — F03.90: ICD-10-CM

## 2020-05-05 DIAGNOSIS — T38.0X5A: ICD-10-CM

## 2020-05-05 DIAGNOSIS — E11.9: ICD-10-CM

## 2020-05-05 DIAGNOSIS — R53.81: ICD-10-CM

## 2020-05-05 DIAGNOSIS — R26.9: ICD-10-CM

## 2020-05-05 DIAGNOSIS — E78.5: ICD-10-CM

## 2020-05-05 PROCEDURE — 10112: CPT

## 2020-05-05 NOTE — NUR
ASSUMED CARE OF PT AT SHIFT CHANGE: SLEEPING, VERY LETHARGIC, TRENDELENDBERG
TO REPOSITION UP HIGHER IN BED FOR MEDS AND BFAST. THEN SHE OPENED EYES AND
ANSWERED QUESTIONS. VERY WEAK HAND , VERY EDEMATOUS ALL OVER, CLEAR LUNG
SOUNDS, SHALLOW INSPIRATIONS, ENCOURAGED W/DEEP SLOW EFFECTIVE BREATHING.
A&0X3, THOUGHT PRESIDENT WAS AURA. SLOW TO ANSWER, SMILED WHEN WE TALKED
ABOUT HER NIGHT NURSE, DESHAUN. SAID SHE HAD FIVE CHILDREN, LOST TWO. AGAIN,
SLOW TO RESPOND, SHOWS LITLE INTEREST IN BFAST. EDUCATION GIVEN ON STRENGTH
GAINED IF SHE CAN INGEST. ALL ITEMS OPENED, SHE CAN HOLD THE OJ WHEN ASKED YET
JUST SHOWS NO INTEREST. SEE SEPARATE INTERVENTIONS FOR ASSESSMENTS. WILL
CONTINUE TO MONITOR

## 2020-05-05 NOTE — NUR
Discussed with patient she is accepted to [facility]. Patient wanted to think things
over. Returned call to patient in room later. She reports she is agreeable to
transfer to [facility] today. Left message with son to call casemgt.

## 2020-05-05 NOTE — NUR
ADMITTED TO ROOM 503. PATIENT IS ALERT AND ORIENTED X4. PATIENT POLK'S, 
ARE EQUAL. LUNGS ARE CLEAR AND DEMINISHED. ABD IS SOFT WITH BSX4. PATIENT HAS
3+ EDEMA IN HER LOWER EXTREMITIES. UP IN RECLINER FOR MEALS. S.L. PATIENT AND
INTACT IN PATIENTS LEFT FORARM.  ABD IS SOFT WITH BSX4. FALL AND SAFETY
PROTOCOLS IN PLACE. C/O LOWER BACK PAIN. MEDICATED WITH SCED OINTMENT. PLAN
P.T., S.T. , O.T. STEPHANI IN A.M. WILL CONTINUE TO  MONITER.

## 2020-05-05 NOTE — NUR
DISCHARGE: WHEN ABLE WILL GATHER PT AND HER BELONGINGS AND TRANSFER UP TO [facility].
SHE'LL BE RECEIVING IV MEDS SO WILL LEAVE IV INTACT, AND REMOVE TELE. GIVING
REPORT TO [facility] RN.

## 2020-05-05 NOTE — PLAN
[facility]
[address] [place]   [address]                     REHAB UNIT PLAN OF CARE       
_______________________________________________________________________________
 
Name:       TERENCE BUCIO              Room #:         503-P       ADM IN  
M.R.#:      [id number]                       Account #:      [id number]  
Admission:  05/05/20    Attend Phys:    Avni Euceda MD 
Discharge:              Date of Birth:  [birth year]  
                                                          Report #: [id number]
                                                                    [id number]   
_______________________________________________________________________________
THIS REPORT FOR:   //name//                          
 
CC: Anatoliy Euceda
 
DATE OF SERVICE:  05/08/2020
 
 
PROGRESS NOTE/OVERALL PLAN OF CARE
 
SUBJECTIVE:  The patient was seen earlier today in no distress.  Temperature
36.6, pulse 90, respirations 18, and blood pressure 151/64.  She has been
working in therapies with sit to stand, standby assistance.  We are monitoring
her lower extremity lymphedema.  She has been able to ambulate up to 50 feet
contact guard with a front-wheeled walker.  She is max assist, lower body
dressing.  She does have moderate memory and cognitive deficits that are noted. 
Appreciate Neurology followup.  They note that her encephalopathy has improved. 
She continues on acyclovir and she is on steroids.
 
ASSESSMENT:
1.  Metabolic encephalopathy.
2.  Elevated sedimentation rate with possible vasculitis.
3.  Medical complex with generalized debilitation.
4.  Dysphagia.  The patient is seen by Speech Therapy and is on a mechanical
soft, thin liquid diet.
5.  Chronic back pain.
6.  Diabetes mellitus type 2.
7.  Hypertension.
8.  Underlying dementia.
9.  Obesity.
 
PLAN:  The overall plan of care is based on the preadmission screen,
post-admission physician evaluation and information garnered from therapy
assessments.
1.  Estimated length of stay is probably at least 7-10 days.
2.  Medical prognosis is reasonably good.
3.  Anticipated interventions includes the interdisciplinary acute inpatient
rehabilitation program.
4.  Anticipated functional outcomes would be for the patient to improve with
mobility and ADLs as well as overall cognition so that she can achieve prior
functional level with the front-wheeled walker to improve as far as overall
cognition and swallowing issues.
5.  Discharge destination would be back home with son and daughter-in-law.
6.  Expected therapy by discipline includes PT, OT and speech 1 hour per day
 
 
 
[address]   [address]                     REHAB UNIT PLAN OF CARE       
_______________________________________________________________________________
 
Name:       TERENCE BUCIO              Room #:         503-P       [facility] [place]  
M.R.#:      [id number]                       Account #:      [id number]  
Admission:  05/05/20    Attend Phys:    Avni Euceda MD 
Discharge:              Date of Birth:  [birth year]  
                                                          Report #: [id number]
                                                                    [id number]   
_______________________________________________________________________________
each five days a week throughout the duration of the acute inpatient
rehabilitation stay.
 
 
 
 
 
 
 
 
 
 
 
 
 
 
 
 
 
 
 
 
 
 
 
 
 
 
 
 
 
 
 
 
 
 
 
 
 
 
 
 
 
 
                         
   By:                               
                   
D: 05/08/20 1543                           _____________________________________
T: 05/09/20 0005                           Avni Euceda MD           /PMT

## 2020-05-05 NOTE — H
[facility]
Kiran [place]   [address]                     HISTORY AND PHYSICAL          
_______________________________________________________________________________
 
Name:       TERENCE BUCIO              Room #:         503-P       ADM IN  
M.R.#:      [id number]                       Account #:      [id number]  
Admission:  05/05/20    Attend Phys:    Avni Euceda MD 
Discharge:              Date of Birth:  [birth year]  
                                                          Report #: [id number]
                                                                    [id number]   
_______________________________________________________________________________
THIS REPORT FOR:  
 
cc:  Anatoliy Wheat MD, Bernard O. MD Smithson,Avni FLYNN MD                                         ~
CC: Anatoliy Euceda
 
DATE OF SERVICE:  05/05/2020
 
 
HISTORY AND PHYSICAL AND POSTADMISSION PHYSICIAN EVALUATION
 
HISTORY OF PRESENT ILLNESS:  The patient is a 75-year-old white female
originally admitted to [facility] on 04/21/2020 with mental
status changes, delusions.  Family was concerned.  She underwent an LP revealing
elevated RBC, but normal protein.  She had an elevated sed rate.  Infectious
Disease and Neurology are involved.  She was placed on dexamethasone and IV
acyclovir until HSV PCR results back.  Concerns for vasculitis per Neurology. 
She was initially treated with antibiotics for urinary tract infection, but
culture returned negative and antibiotics were stopped.  Psychiatry was also
involved and she has had several medications changes and her mentation has
improved.  Per family report, she is typically alert and oriented x 3.  MRI of
the brain showed no acute changes.  Psychiatry has continued to follow as the
patient has a prior history of depression.  They note that she has decreased
insight into how cognitively impaired she was.  Infectious Disease is continuing
the patient on the acyclovir awaiting HSV testing from CSF.  Syphilis antibodies
were noted to be negative.  The patient has had a significant functional decline
from her premorbid status and has now been admitted for acute in-hospital
inpatient rehabilitation.
 
PAST MEDICAL HISTORY:  Prior medical history includes diabetes mellitus,
hypertension, hyperlipidemia, depression, anxiety, back pain, exogenous obesity.
 
MEDICATIONS:  Please see the full medication listing.
 
ALLERGIES:  No known drug allergies.
 
SOCIAL HISTORY:  The patient lives with her son and daughter-in-law in a house. 
She notes that they live with her.  No steps in, ranch style.  Son works outside
of the home, but the daughter-in-law is there and can assist.  She used a
front-wheeled walker and was independent with ADLs.  Family provides IADLs.
 
REVIEW OF SYSTEMS:  Does not like the Vera catheter.  No chest pain, shortness
of breath or abdominal discomfort.  She was not aware that she was told she had
swallowing issues.  She is on a pureed nectar thickened liquid diet as per
 
 
 
[address]   [address]                     HISTORY AND PHYSICAL          
_______________________________________________________________________________
 
Name:       TERENCE BUCIO              Room #:         503-P       [facility] [place]  
[facility]#:      [id number]                       Account #:      [id number]  
Admission:  05/05/20    Attend Phys:    Avni Euceda MD 
Discharge:              Date of Birth:  [birth year]  
                                                          Report #: [id number]
                                                                    [id number]   
_______________________________________________________________________________
speech therapy recommendations.
 
PHYSICAL EXAMINATION:
GENERAL:  The patient is 75-year-old obese white female in no obvious distress.
VITAL SIGNS:  Last recorded temperature 98.6, pulse 98, respirations 18, blood
pressure 137/72.
NEUROLOGIC:  She is alert, definite latency to her responses, but will follow
basic 1 step commands.  She has decreased insight into her deficits.
HEENT:  Facies appeared symmetric.
CHEST:  Sounded clear to auscultation.
CARDIOVASCULAR:  Regular rate and rhythm.
ABDOMEN:  Obese, bowel sounds positive, nontender.
GENITOURINARY AND RECTAL:  She does have the indwelling Vera catheter.
EXTREMITIES:  She has functional range of motion of both upper extremities. 
Strength is probably a grade 3+/5.  Lower extremities, nonpitting edema. 
Strength is probably grade 3+/5.  No obvious foot drop.  Tone appeared to be
intact.  She is probably at least a 3+/5, bilateral lower extremity strength. 
Sit to stand has been mod assist and she has been able to take a few steps mod
assist with a front-wheeled walker to the chair.  She does have significant
exogenous obesity.  Last recorded height 5 feet 2 inches, weight 249 pounds. 
The patient is on nasal prong O2, 2 liters.
 
ASSESSMENT:  A 75-year-old female with the following problem list:
1.  Toxic metabolic encephalopathy.
2.  Elevated sedimentation rate with possible vasculitis.  On acyclovir as per
Infectious Disease.
3.  Dysphagia, currently on pureed nectar.  Speech therapy to follow.
4.  Medical ____ with generalized debilitation.
5.  Chronic back pain.
6.  Type 2 diabetes mellitus.
7.  Hypertension.
8.  Obesity.
9.  Documentation of underlying dementia.
 
PLAN:  The patient is admitted for acute in-hospital inpatient rehabilitation. 
From a postadmission physician evaluation perspective, there are no relevant
changes since the preadmission screening.  Please see the above review of prior
and current medical and functional conditions and comorbidities.  Please see the
patient's previous and current functional status.  As far as risk of
complications, the patient has multiple medical comorbidities as noted above. 
Initial plan of care involves the interdisciplinary acute inpatient
rehabilitation program.  Measurable functional goals would be for the patient to
become modified independent with transfers, mobility, ADLs, swallowing and
cognition or at least improved cognition to the point where she is at a
functional level where she can return back to the home setting.  Prognosis is
reasonably good with estimated length of stay probably 10 days to 2 weeks and
 
 
 
[address]   [address]                     HISTORY AND PHYSICAL          
_______________________________________________________________________________
 
Name:       TERENCE BUCIO              Room #:         503-P       ADM IN  
..#:      [id number]                       Account #:      [id number]  
Admission:  05/05/20    Attend Phys:    Avni Euceda MD 
Discharge:              Date of Birth:  [birth year]  
                                                          Report #: [id number]
                                                                    [id number]   
_______________________________________________________________________________
potentially longer.  Potential barriers would include her multiple medical
comorbidities and decreased functional status.
 
The patient meets diagnostic criteria for an acute in-hospital inpatient
rehabilitation stay.  She meets medical necessity criteria.  We will have the
consultant physicians continue to follow.  She does have the tolerance for
therapies and has appropriate discharge goals back to the home setting.
 
 
 
 
 
 
 
 
 
 
 
 
 
 
 
 
 
 
 
 
 
 
 
 
 
 
 
 
 
 
 
 
 
 
 
 
 
                         
   By:                               
                   
D: 05/06/20 0835                           _____________________________________
T: 05/06/20 0857                           Avni Euceda MD           /nt

## 2020-05-06 VITALS — SYSTOLIC BLOOD PRESSURE: 152 MMHG | DIASTOLIC BLOOD PRESSURE: 80 MMHG

## 2020-05-06 VITALS — DIASTOLIC BLOOD PRESSURE: 72 MMHG | SYSTOLIC BLOOD PRESSURE: 137 MMHG

## 2020-05-06 VITALS — SYSTOLIC BLOOD PRESSURE: 96 MMHG | DIASTOLIC BLOOD PRESSURE: 61 MMHG

## 2020-05-06 LAB
ANION GAP SERPL CALC-SCNC: 8 MMOL/L (ref 7–16)
BACTERIA-REFLEX: (no result) /HPF
BILIRUB UR-MCNC: NEGATIVE MG/DL
BUN SERPL-MCNC: 10 MG/DL (ref 7–18)
CALCIUM SERPL-MCNC: 9 MG/DL (ref 8.5–10.1)
CHLORIDE SERPL-SCNC: 99 MMOL/L (ref 98–107)
CO2 SERPL-SCNC: 29 MMOL/L (ref 21–32)
COLOR UR: YELLOW
CREAT SERPL-MCNC: 0.6 MG/DL (ref 0.6–1)
ERYTHROCYTE [DISTWIDTH] IN BLOOD BY AUTOMATED COUNT: 15.2 % (ref 10.5–14.5)
GLUCOSE SERPL-MCNC: 182 MG/DL (ref 74–106)
HCT VFR BLD CALC: 30.7 % (ref 37–47)
HGB BLD-MCNC: 10.1 GM/DL (ref 12–15)
KETONES UR STRIP-MCNC: NEGATIVE MG/DL
MCH RBC QN AUTO: 27.1 PG (ref 26–34)
MCHC RBC AUTO-ENTMCNC: 32.8 G/DL (ref 28–37)
MCV RBC: 82.5 FL (ref 80–100)
PLATELET # BLD: 373 THOU/UL (ref 150–400)
POTASSIUM SERPL-SCNC: 3.8 MMOL/L (ref 3.5–5.1)
RBC # BLD AUTO: 3.72 MIL/UL (ref 4.2–5)
RBC # UR STRIP: (no result) /UL
RBC #/AREA URNS HPF: (no result) /HPF (ref 0–2)
SODIUM SERPL-SCNC: 136 MMOL/L (ref 136–145)
SP GR UR STRIP: 1.01 (ref 1–1.03)
SQUAMOUS: (no result) /LPF (ref 0–3)
URINE CLARITY: (no result)
URINE GLUCOSE-RANDOM*: NEGATIVE
URINE LEUKOCYTES-REFLEX: (no result)
URINE NITRITE-REFLEX: NEGATIVE
URINE PROTEIN (DIPSTICK): NEGATIVE
URINE WBC-REFLEX: (no result) /HPF (ref 0–5)
UROBILINOGEN UR STRIP-ACNC: 0.2 E.U./DL (ref 0.2–1)
WBC # BLD AUTO: 6.3 THOU/UL (ref 4–11)

## 2020-05-06 NOTE — NUR
Assumed pt care at 1900. A/OX4,BP elevated at /71 medicated with
scheduled BP meds and effective 137/72. Pt's up with AX2, RW/GB,unsteady and
weak gait. C/o right knee pain/headache, order obtained for Tylenol from
Lupe ZAMORA and administered with relief reported. Vera to DD with yellow
urine noted. Edema 3+ on BLE elevated when laying down. Refuses to put SCDs at
this time. Fall precautions implemented,pt calls apropp. Resting quietly
oxygen in place @2L/NC will continue to monitor pt.

## 2020-05-06 NOTE — NUR
[facility]A IN INTAKE SHE RECEIVED
REFERRAL AND CAN ACCEPT AT DC. DP TO FOLLOW.

## 2020-05-06 NOTE — NUR
Case opened to follow for dc planning. Pt known to cm from previous
admissions. Pt normally goes to [facility] for rehab and has [facility] Puga for
HH followup. She lives with her son Steven Flores Jr (Brooks) and dtr in law
Isabelle. Brooks works and is often not able to take calls during the work day.
Message left for him or DIL Isabelle to contact cm to discuss dc planning needs
and to see what concerns or questions they may have. The pt has a rwalker, w/c
and cane at home. When they spoke with Neuro on acute care, they noted her
memory has declined and she has been requiring more supervision assist with
adl's. They provide for all IADL's. The pt is currently on o2 at 2liters.
Gil Puga to be contact for likely restart of care when ready for dc
home from rehab. [facility] may be an option for a SNF stay if needed
;however the pt's goal is to return home with hh at [place]. Pt's son brought some
of her clothing and cell  by for her last night. Will followup with
pt's family regarding any barriers or questions they may have. Dc plan at this
time is to return home with family and hh services. Pt's mental status is
improving and she is able to participate in dc planning converstaion via
phone. Will follow.

## 2020-05-06 NOTE — NUR
ASSUMED CARE AT 0700. PAITENT IS ALERT AND ORIENTED X3. PATIENT POLK,  ARE
EQUAL. LUNGS ARE CLEAR AND DEMINISHED. 02 AT 2L PER N/C. ABD IS SOFT WITH
BSX4. PATIENT HAS QIU TO DD, DRAINING VENU COLORED URINE WITH SEDIMENT.
N.P. HERE TO SEE PATIENT. UP IN W/C FOR MEALS. PATIENT HAS 3+L.E. EDEMA.
FALL AND SAFETY PROTOCOLS IN PLACE. C/O CHRONIC BACK, KNEE PAIN. MEDICATED
WITH PRN PAIN MEDS. CONTINUES TO PROGRESS TOWARDS D/C GOALS. WILL CONTINUE TO
MONITER.

## 2020-05-07 VITALS — DIASTOLIC BLOOD PRESSURE: 61 MMHG | SYSTOLIC BLOOD PRESSURE: 149 MMHG

## 2020-05-07 VITALS — SYSTOLIC BLOOD PRESSURE: 157 MMHG | DIASTOLIC BLOOD PRESSURE: 58 MMHG

## 2020-05-07 LAB
EST. AVERAGE GLUCOSE BLD GHB EST-MCNC: 117 MG/DL
GLYCOHEMOGLOBIN (HGB A1C): 5.7 % (ref 4.8–5.6)
SPECIMEN SOURCE: (no result)

## 2020-05-07 NOTE — NUR
TRAZADONE TWICE AT HS, PATIENT DENIES SLEEPING. TURNED TO LEFT SIDE WITH
ASSIST AND PILLOW AT BACK WITH LEGS ELEVATED AND WRAPPED. INFORMED OF CATHETER
REMOVAL LATER THIS MORNING.

## 2020-05-07 NOTE — NUR
ASSUMED CARE OF PT AT 0700. PT IS A&OX4, FLAT AFFECT, AND FORGETFUL. PT
REPORTS PAIN, PO MEDICATIONS PROVIDED PER ORDERS. QIU CATHETER REMOVED PRIOR
TO SHIFT CHANGE AND PT VOIDED WITH NO RESIDUAL NOTED ON BLADDER SCAN. REPORTED
NAUSEA FOLLOWING BREAKFAST, TREATED WITH PO MEDICATIONS. REPORTS POOR SLEEP
OVERNIGHT. IV TO LEFT WRIST PATENT. ACCU CHECKS ACHS. FALL PRECAUTIONS IN
PLACE AND NURSING WILL CONTINUE TO MONITOR.

## 2020-05-08 VITALS — SYSTOLIC BLOOD PRESSURE: 151 MMHG | DIASTOLIC BLOOD PRESSURE: 64 MMHG

## 2020-05-08 VITALS — DIASTOLIC BLOOD PRESSURE: 59 MMHG | SYSTOLIC BLOOD PRESSURE: 133 MMHG

## 2020-05-08 NOTE — NUR
ASSUMED CARE OF PT AT 0700. PT IS A&OX4 AND VITAL SIGNS ARE STABLE. PT
APPEARED SEDATED THIS AM AND SLEPT THROUGH PHYSICAL ASSESSMENT. GRIMICE WITH
PAINFUL STIMULI. ACCU CHECKS ACHS. IV TO LEFT WRIST PATENT. PT ENCOURAGED TO
DRINK FLUIDS. PT INCONTINENT, STATED THAT SHE WAS ABLE TO RECOGNIZE THAT SHE
WAS INCONTINENT IN BRIEF, BUT DID NOT WANT TO GET UP AT THAT TIME TO BE
CHANGED. PAIN MANAGED WITH PO MEDICAITONS. PER LYMPHEDEMA THERAPIST PT REFUSED
TO ALLOW FOR WRAPS TO BE REPLACED THIS SHIFT. FALL PRFECAUTIONS IN PLACE AND
NURSING WILL CONTINUE TO MONITOR.

## 2020-05-08 NOTE — NUR
PT ASSESSMENT COMPLETED AND VSS. MEDS GIVEN AS ORDERED AND WELL TOLERATED.
FALL PRECAUTIONS IN PLACE. VOIDING MODERATE AMOUNT OF YELLOW URINE. ASST WITH
REPOSITION FOR COMFORT. PT DID NOT WANT A GOWN AT BEDTIME. SLEEPING AND PAIN
MEDICATION WORKING WELL. PT SLEEPING. WILL CONTINUE TO MONITOR FREQUENTLY.

## 2020-05-09 VITALS — SYSTOLIC BLOOD PRESSURE: 132 MMHG | DIASTOLIC BLOOD PRESSURE: 52 MMHG

## 2020-05-09 VITALS — DIASTOLIC BLOOD PRESSURE: 68 MMHG | SYSTOLIC BLOOD PRESSURE: 145 MMHG

## 2020-05-09 NOTE — NUR
ASSUMED CARE APPROX 1900 EVENING 5/8. PT ALERT AND ORIENTED X4, SITTING UP IN
RECLINER AT CHANGE OF SHIFT. PT REFUSED TO TAKE STOOL MEDS STATING SHE HAD "A
BLOWOUT A COUPLE DAYS AGO". PT INCONTINENT OF URINE ASSISTED WITH BRIEF CHANGE
AND INTO BED. PT TOOK HS MEDS WITH WATER TOLERATING WELL. PT APPEARS TO BE
SLEEPING SOUNDLY WITH HOURLY ROUNDING. BED ALARM ON AND CALL LIGHT IN REACH.
WILL CONTINUE TO MONITOR.

## 2020-05-09 NOTE — NUR
ASSUMED CARE AT 0700. PATIENT IS ALERT AND ORIENTED X4. PATIENT POLK'S, 
ARE EQUAL. LUNGS ARE CLEAR. ABD IS SOFT WITH BSX4. MIRALAX GIVEN FOR NO BM X3
DAYS. PT UP IN W/C AND OUT TO DINING ROOM FOR MEALS. S.T. HERE TO EVAL
PATIENTS SWALLOWING. MEDS GIVEN WITHOUT DIFFICULTY SWALLOWING. PATIENT  C/O
BACK AND KNEE PAIN. MEDICATED WITH PRN PAIN MED. PATIENT C/O NAUSEA. PATIENT
MEDICATED WITH PRN ANTIEMETIC. FALL AND SAFETY PROTOCOLS IN PLACE. C/O PAIN AS
ABOVE, WITH ABOVE TX.  UP WITH ASSIST OF 1 STAFF WITH GAIT BELT AND WALKER.
WILL CONTINUE TO MONITER.

## 2020-05-10 VITALS — SYSTOLIC BLOOD PRESSURE: 132 MMHG | DIASTOLIC BLOOD PRESSURE: 52 MMHG

## 2020-05-10 VITALS — SYSTOLIC BLOOD PRESSURE: 150 MMHG | DIASTOLIC BLOOD PRESSURE: 71 MMHG

## 2020-05-10 NOTE — NUR
ASSUMED CARE AT APPROX 1900 EVENING 5/9. PT LYING IN BED AT CHANGE OF SHIFT
DOZING OFF AND ON WITH HEAD OF BED ELEVATED. PT AWOKE TO TAKE HS MEDS WITH NO
PROBLEMS AND THEN PT WENT BACK TO SLEEP. PT APPEARS TO BE SLEEPING SOUNDLY
WITH HOURLY ROUNDING CHECKS. BED ALARM ON AND CALL LIGHT IN REACH. WILL
CONTINUE TO MONITOR.

## 2020-05-10 NOTE — NUR
PT ASSESSMENT COMPLETED AND VSS. MEDS GIVEN AS ORDERED AND WELL TOLERATED.
FALL PRECAUTIONS IN PLACE. SLEEPING MEDICATION WORKING WELL. ASST WITH
REPOSITION FOR COMFORT. SAT WNL ON 2L NC. INC OF LARGE AMOUNTS OF
URINE. ADILIA CARE PROVIDED. SLEEPING WELL. WILL CONTINUE TO MONITOR FREQUENTLY.

## 2020-05-10 NOTE — NUR
ASSUMED CARE AROUND 0700, PT A&O X 4, NO ACUTE CHANGES NOTED. VSS, O2 ON 2L
VIA NC. PT C/O BACK PAIN RELIEVED WITH PRN NORCO. ALSO HAS GRETEL VOLTERAN GEL.
MEDS GIVEN PER ORDERS, PARTICIPATED IN GRETEL THERAPIES. BG ACHS WITH SS AS
NEEDED. INCONTINENT OF B&B, LARGE BM TODAY, BROWN AND SOFT. PT SLEEPING IN
BED, CALL LIGHT WITHIN REACH, WILL CONTINUE TO MONITOR AS PER POC.

## 2020-05-11 VITALS — SYSTOLIC BLOOD PRESSURE: 122 MMHG | DIASTOLIC BLOOD PRESSURE: 70 MMHG

## 2020-05-11 LAB
ANION GAP SERPL CALC-SCNC: 3 MMOL/L (ref 7–16)
ANISOCYTOSIS BLD QL SMEAR: (no result)
BASOPHILS NFR BLD AUTO: 1 % (ref 0–2)
BUN SERPL-MCNC: 23 MG/DL (ref 7–18)
CALCIUM SERPL-MCNC: 8.9 MG/DL (ref 8.5–10.1)
CHLORIDE SERPL-SCNC: 101 MMOL/L (ref 98–107)
CO2 SERPL-SCNC: 36 MMOL/L (ref 21–32)
CREAT SERPL-MCNC: 0.6 MG/DL (ref 0.6–1)
EOSINOPHIL NFR BLD: 2 % (ref 0–3)
ERYTHROCYTE [DISTWIDTH] IN BLOOD BY AUTOMATED COUNT: 15.9 % (ref 10.5–14.5)
GLUCOSE SERPL-MCNC: 93 MG/DL (ref 74–106)
GRANULOCYTES NFR BLD MANUAL: 68 % (ref 36–66)
HCT VFR BLD CALC: 33 % (ref 37–47)
HGB BLD-MCNC: 10.5 GM/DL (ref 12–15)
LYMPHOCYTES NFR BLD AUTO: 15 % (ref 24–44)
MAGNESIUM SERPL-MCNC: 2.2 MG/DL (ref 1.8–2.4)
MCH RBC QN AUTO: 26.8 PG (ref 26–34)
MCHC RBC AUTO-ENTMCNC: 31.9 G/DL (ref 28–37)
MCV RBC: 84.1 FL (ref 80–100)
METAMYELOCYTES NFR BLD: 7 %
MONOCYTES NFR BLD: 4 % (ref 1–8)
MYELOCYTES NFR BLD: 3 %
NEUTROPHILS # BLD: 7.6 THOU/UL (ref 1.4–8.2)
NEUTS BAND NFR BLD: 0 % (ref 0–8)
PLATELET # BLD: 317 THOU/UL (ref 150–400)
POTASSIUM SERPL-SCNC: 4.4 MMOL/L (ref 3.5–5.1)
RBC # BLD AUTO: 3.92 MIL/UL (ref 4.2–5)
SODIUM SERPL-SCNC: 140 MMOL/L (ref 136–145)
WBC # BLD AUTO: 11.2 THOU/UL (ref 4–11)

## 2020-05-11 NOTE — NUR
PT ASSESSMENT COMPLETED AND VSS. MEDS GIVEN AS ORDERED AND WELL TOLERATED.
FALL PRECAUTIONS IN PLACE. UP TO THE BATHROOM EARLY DURING THE SHIFT. INC OF
URINE. PRN PAIN MEDICATION HELPFUL. PT ANXIOUS TO GO HOME SOON. ASST WITH
REPOSITION AND ADILIA CARE. SLEEPING. WILL CONTINUE TO MONITOR FREQUENTLY.

## 2020-05-11 NOTE — NUR
ASSUMED CARE OF TP AT 0700. PT IS A&OX4 AND VITAL SIGNS ARE STABLE. PT IS MORE
EXPRESSIVE THIS SHIFT AND SAYS THAT SHE FEELS LESS DEPRESSED TODAY. PT DID
REPORT SOME CONCERNS ABOUT ANTI-DEPRESSANT MEDICATIONS THIS EVENING PRIOR TO
SHIFT CHANGE, INFORMED PT WILL DISCUSS WITH PROVIDER. PT REPORTED PAIN, MANGED
WITH PO MEDICATIONS, PARTICIPATED IN SCHEDULED THERAPIES. ACCU CHECKS ACHS.
FALL PRECAUTIONS IN PLACE AND NURSING WILL CONTINUE TO MONITOR.

## 2020-05-12 VITALS — SYSTOLIC BLOOD PRESSURE: 142 MMHG | DIASTOLIC BLOOD PRESSURE: 81 MMHG

## 2020-05-12 VITALS — DIASTOLIC BLOOD PRESSURE: 48 MMHG | SYSTOLIC BLOOD PRESSURE: 138 MMHG

## 2020-05-12 NOTE — NUR
team meeting, recommendation: going to need assist with adl's at home. will
need to see how much assistance vs supervision son and daughter in law can
provide. if family can, dc 5/20 hh ( pt, ot, st, nursing ) family training.
outpt neuro pysch  3m post dc if family would like pt eval.
[facility].

## 2020-05-12 NOTE — NUR
ASSUMED CARE OF PT AT 0700. PT IS A&OX4 AND VITAL SIGNS ARE STABLE. PT
REPORTED PAIN, MANAGED WITH PO MEDICAITONS, PARTICIPATED IN SCHEDULED
THERAPIES. ACCU CHECKS ACHS. PT UPSET THIS EVENING BECAUSE SHE STATES THAT SHE
WANTED TO GO HOME BY END OF THIS WEEK AND STAETD THAT SHE ONLY AGREED TO STAY
FOR 2 WEEKS. WHEN EXPLAINED TO HER THAT DISCHARGE GOAL IS FOR NEXT WEDNESDAY
WHICH WOULD BE 2 WEEKS ON REHAB SHE BEGAN TO CRY AND SAID "MY SON WILL COME
AND PICK ME UP AND I WILL JUST LEAVE." EXPLAINED TO PT THE REASON FOR
CONTINUING THERAPY AS LONG AS POSSIBLE. PT CONTINUES TO SAY THAT IF SHE IS NOT
ALLOWED TO LEAVE OVER WEEKEND THEN SHE WILL LEAVE AMA, BUT THAT SHE WILL
DISCUSS CONCERNS WITH  IN [place]. FALL PRECAUTIONS IN PLACE AND NURSING
WILL CONTINUE TO MONITOR.

## 2020-05-12 NOTE — NUR
[facility]PO[facility] WITH DAVID THAT DC DATE IS 5/20 AND PT WILL
NEED PT, OT, ST, NURSING. DP TO FOLLOW

## 2020-05-13 VITALS — SYSTOLIC BLOOD PRESSURE: 145 MMHG | DIASTOLIC BLOOD PRESSURE: 48 MMHG

## 2020-05-13 VITALS — DIASTOLIC BLOOD PRESSURE: 64 MMHG | SYSTOLIC BLOOD PRESSURE: 136 MMHG

## 2020-05-13 VITALS — DIASTOLIC BLOOD PRESSURE: 58 MMHG | SYSTOLIC BLOOD PRESSURE: 131 MMHG

## 2020-05-13 NOTE — NUR
TRANSFER TO BED WITH 1P ASSIST AFTER INCONTINENT OF URINE. DECLINED LAXATIVES,
HAD BM 5/11. TOLERATING MEDS ALL AT ONCE WITH COLD WATER. TURN TO RIGHT SIDE
AND HAS BEEN SLEEPING WELL. DISCUSSED BLOOD SUGAR OVER 200 IS PROBABLY RELATED
TO PREDNISONE AND THAT SUGARS WILL NORMALIZE A LITTLE AFTER FINISHING
PREDNISONE TAPER.

## 2020-05-13 NOTE — NUR
ASSUMED CARE OF PT AT 0700. PT IS A&OX4 AND VITAL SIGNS ARE STABLE. PT REPORTS
PAIN, MANAGED WITH PO MEDICAITONS. HEATING PAD ORDERED FOR PAIN MANAGEMENT
BETWEEN MEDICAITON DOSES. ACCU CHECKS ACHS. REFUSED BOWEL MEDS, BOWEL SOUNDS
ACTIVE IN ALL QUADRANTS, REGULAR BOWEL MOVEMENTS REPORTED. BARRIER CREAM
APPLIED TO BUTTOCKS WITH TOILETING. PT REPORTS INCREASED ANXIETY AND
DEPRESSION ABOUT ANTICIPATED DISCHARGE DATE, DR SIMMONS VISITED WITH PT,
ENCOURAGING PT TO PARTICIPATE AND RECOGNIZING PROGRESS MADE. PT ENCOURANGED TO
CONTACT FAMILY ON PHONE BETWEEN THERAPY SESSIONS. FALL PRECAUTIONS IN PLACE
AND NURSING WILL CONTINUE TO MONITOR.

## 2020-05-14 VITALS — DIASTOLIC BLOOD PRESSURE: 62 MMHG | SYSTOLIC BLOOD PRESSURE: 142 MMHG

## 2020-05-14 VITALS — DIASTOLIC BLOOD PRESSURE: 67 MMHG | SYSTOLIC BLOOD PRESSURE: 172 MMHG

## 2020-05-14 NOTE — NUR
ASSUMED CARE OF PT AT 1900HRS. PT TRANSFERRED FROM CHAIR TO BED WITH 1P
ASSIST. PT IS INCT. PT REFUSED STOOL SOFTENERS. LBM 5/13. PT WAS COVERED FOR A
FSBS . O2 VIA NC CONTINUED AT 2L PER HOME RGIMEN. PT WAS ABLE TO GET
COMFORTABLE AND SLEEP PART OF THE SHIFT. VSS AND NO S/S OF ACUTE DISTRESS.
WILL CONTINUE TO MONITOR.

## 2020-05-14 NOTE — NUR
ASSUMED CARES AT 0700. PT AWAKE, ALERT AND ORIENTED*4. FLAT AFFECT. DENIES
PAIN. C/O INDIGESTION, REFUSED LAXATIVES AND STOOL SOFTENORS. ABDOMEN SOFT,
OBESE WITH ACTIVE BS, LAST REPORTED BM 5/13. LS CLEAR/ DIMINISHED, ON 2L VIA
NC WITH SATS >94%. LYPHEDEMA WRAPS ON BLE REMAIN DRY AND INTACT. NYSTATIN
POWDER APPLIED TO GROIN AREA. PT UP WITH 1 MOD ASSIST GB AND WALKER. Q1H
VISUAL CHECKS. CALL LIGHT WITHIN REACH. FALL PRECAUTIONS IN PLACE

## 2020-05-15 VITALS — SYSTOLIC BLOOD PRESSURE: 142 MMHG | DIASTOLIC BLOOD PRESSURE: 55 MMHG

## 2020-05-15 VITALS — SYSTOLIC BLOOD PRESSURE: 122 MMHG | DIASTOLIC BLOOD PRESSURE: 68 MMHG

## 2020-05-15 NOTE — NUR
ASSUMED CARE ON 05/15/20 @ 1900, SITTING IN CHAIR IN ROOM, 2L O2 NC SPO2 99%,
LYMPHEDEMA WRAP C/D/I TO LOWER EXTREMITIES, TOES +3 EDEMA BILAT. HRRR, LUNGS
CTA, MOVING AIR BILAT. ABD N X 4 Q REFUSED SENNA/DOCUSATE SODIUM AND MIRILAX.
REPORTS SOFT FORMED BM TODAY. UP WITH MINIMUM ASSIST WITH WALKER. NYSTATIN
POWDER APPLIED UNDER BREASTS AND GROIN.  HYDROCODONE PRN PROVIDED FOR PAIN @
20:59. ACUCHECK 180, 3 U SLIDING SCALE COVERAGE PROVIDED.
IN BED EYES CLOSED, RESPIRATIONS EVEN AND UNLABORED, BED ALARM SET,
BED IN LOW POSITION, WILL CONTINUE HOURLY ROUNDING.

## 2020-05-15 NOTE — NUR
ASSUMED CARES AT 0700. STILL SLEEPY THIS AM. DENIES PAIN. PT DENIES PAIN. LAST
BM WAS YESTERDAY. REFUSED LAXATIVES AND STOOL SOFTENERS. ABDOMEN SOFT, OBESE
WITH ACTIVE BS, LAST BM WAS YESTERDAY, INCONT 3X LAST NIGHT PER NIGHT RAN.  LS
CLEAR/ DIMINISHED, ON 2L VIA NC WITH SATS >99%. LYPHEDEMA WRAPS ON BLE REMAIN
DRY AND INTACT. NYSTATIN POWDER APPLIED TO GROIN AREA, BUT IT IS CLEAR NOW.
PT UP WITH 1 MOD ASSIST GB AND WALKER. OFFERED SUPPORTIVE CARE. ENCOURAGED PT
TO VOICE HER NEEDS. BS 96, NO INSULIN GIVEN. CONTINUE TO BE ON MECHANICAL
SOFT, NO STRAW, MEDS TAKE ONE AT THE TIME IN APPLE SAUCE.CONTINUE TO Q1H
VISUAL CHECKS FOR NEEDS AND SAFETY. INCONT BLADDER WILL OFFER TOILETING
FREQUENTLY.  CALL LIGHT WITHIN REACH. FALL PRECAUTIONS IN PLACE.

## 2020-05-15 NOTE — NUR
INCONTINENT OF LARGE AMOUNT YELLOW URINE. TURNED TO LEFT SIDE AND MOISTURE
BARRIER WITH Z-GUARD APPLIED TO REDDENED MID BUTTOCKS. SLEEPING WELL OTHERWISE
WITH 02 2L. STATES SHE IS DEPRESSED AND WISHES SHE COULD GO HOME SOONER THAN
5/20, IS GETTING STRONGER AND CAN STAND WITHOUT ASSIST. DECLINED OFFFER OF HS
SNACK AT TIME OF INSULIN ADMINISTRATION, TAPERING OFF PREDNISONE, LYMPHEDEMA
WRAPS INTACT, VOLTAREN TO LOW BACK.

## 2020-05-16 VITALS — DIASTOLIC BLOOD PRESSURE: 53 MMHG | SYSTOLIC BLOOD PRESSURE: 123 MMHG

## 2020-05-16 VITALS — DIASTOLIC BLOOD PRESSURE: 47 MMHG | SYSTOLIC BLOOD PRESSURE: 129 MMHG

## 2020-05-16 NOTE — NUR
ASSUMED CARE AT 0700. PATIENT IS ALERT AND ORIENTED X4. PATIENT POLK,  ARE
EQUAL. LUNGS ARE CLEAR AND DEMINISHED. ABD IS SOFT WITH BSX4. PAIIENT IS UP
WITH ASSIST OF 1 STAFF AND GAIT BELT. PATIENT UP IN BED FOR MEALS. PATIENT
HAS LYMPHEDEMA WRAPS IN PLACE TO HER LOWER EXTREMITIES. DENIES PAIN AT THIS
TIME CONTINUES TO PROGRESS SLOWLY TOWARDS D/C GOALS. WILL CONTINUE TO MONITER.

## 2020-05-16 NOTE — NUR
PT ALERT AND ORIENTED X 4.  ACE WRAPS C/D/I TO BILAT LE'S.  PT REFUSED
LAXATIVES AT HS.  LISINOPRIL HELD AT HS PER PARAMETERS.  PT DENIES PAIN OR
DISCOMFORT.  BED ALARM ON FOR SAFETY.  PT APPEARS TO BE SLEEPING ON HOURLY
ROUNDS.

## 2020-05-17 VITALS — DIASTOLIC BLOOD PRESSURE: 32 MMHG | SYSTOLIC BLOOD PRESSURE: 141 MMHG

## 2020-05-17 VITALS — SYSTOLIC BLOOD PRESSURE: 146 MMHG | DIASTOLIC BLOOD PRESSURE: 60 MMHG

## 2020-05-17 NOTE — NUR
ASSUMED CARE AT 0700. PATIENT IS ALERT AND ORIENTED X4. PATIENT IS [place].
PATIENT POLK'S,  ARE EQUAL. LUNGS ARE CLEAR. ABD IS SOFT WITH BSX4.
REFUSED LAXATIVES. LAST BM ON 5/16/20. PLAN NOC OX STUDY TONIGHT. FALL AND
SAFETY PROTOCOLS IN PLACE.  DENIES PAIN AT THIS ORTIZ. CONTINUES TO PROGRESS
TOWARDS D/C GOALS. PATIENT IS INCONTINENT OF URINE AT TIMES. PATIENT IS UP
WITH ASSIST OF 1 STAFF WITH GAIT BELT AND WALKER T0 THE BATHROOM. PATIENT HAS
LYMPHEDEMA WRAPS TO HER LOWER EXTREMITIES. WILL CONTINUE TO MONITER.

## 2020-05-18 VITALS — SYSTOLIC BLOOD PRESSURE: 138 MMHG | DIASTOLIC BLOOD PRESSURE: 61 MMHG

## 2020-05-18 VITALS — SYSTOLIC BLOOD PRESSURE: 157 MMHG | DIASTOLIC BLOOD PRESSURE: 65 MMHG

## 2020-05-18 VITALS — DIASTOLIC BLOOD PRESSURE: 61 MMHG | SYSTOLIC BLOOD PRESSURE: 138 MMHG

## 2020-05-18 NOTE — NUR
ASSUMED CARE AT 0700. REPORT DIDN'T SLEEP WELL D/T PAIN. ENCOURAGED PT TO ASK
FOR PRN HYDROCODONE AS NEEDS. PT WAS INCONT BLADDER PER NIGHT RN. BUT HAS BEEN
CONTINENT BLADDER DURING DAY. OFFER TOILETING FREQUENTLY. PATIENT IS ALERT AND
ORIENTED X4. PATIENT IS [place]. ABLE TO VOICE HER NEEEDS.  REASSESSEMENT PER
CHART. HAD BM TODAY. REFUSED LAXATIVE.  REFUSED LAXATIVES. HAD NOC OX STUDY
LAST NIGHT. VSS ON RA THIS AM. C/O BACK PAIN, VOLATEREN GEL APPLIED. C/O LEFT
KNEE BEFORE THERAPY. PRN HYDROCODONE GIVEN. DENIES PAIN NOW. OT GAVE PT SHOWER
THIS AM. LYMPHEDEMA DRESSINGED CHANGED TODAY. EDEMA 2+ BLE ENCOURAGED PT TO
ELEVATE WHEN IN RECLINER. PT UP WITH WALKER TO BATHROOM. PARTICIPATED WITH
THERAPY THIS AM. OFFERED SUPPORTIVE CARE. ENCOURAGED PT TO VOICE HER NEEDS. BS
MONITOR. MEDS GIVEN AS ORDERED.  TOOK MEDS WITH THIN LIQUID WITHOUT
DIFFICULTY.  CONTINUES TO PROGRESS TOWARDS D/C GOALS. FALL PRECAUTION IN
PLACE. RESTING IN RECLINER WATCHING TX.  CALL LIGHT WITHIN REACH. WILL
CONTINUE TO MONITOR.

## 2020-05-18 NOTE — NUR
CHAIR TO BED AFTER 2100 WITH MIN ASSIST. LYMPHEDEMA WRAPS INTACT, NOCTURNAL
DESAT STUDY OVERNIGHT ON O2, SAT CONSISTENTLY 98% OR HIGHER.

## 2020-05-19 VITALS — DIASTOLIC BLOOD PRESSURE: 59 MMHG | SYSTOLIC BLOOD PRESSURE: 146 MMHG

## 2020-05-19 VITALS — DIASTOLIC BLOOD PRESSURE: 36 MMHG | SYSTOLIC BLOOD PRESSURE: 134 MMHG

## 2020-05-19 VITALS — SYSTOLIC BLOOD PRESSURE: 137 MMHG | DIASTOLIC BLOOD PRESSURE: 72 MMHG

## 2020-05-19 LAB
ANION GAP SERPL CALC-SCNC: 7 MMOL/L (ref 7–16)
BASOPHILS NFR BLD AUTO: 0.4 % (ref 0–2)
BUN SERPL-MCNC: 33 MG/DL (ref 7–18)
CALCIUM SERPL-MCNC: 9.1 MG/DL (ref 8.5–10.1)
CHLORIDE SERPL-SCNC: 102 MMOL/L (ref 98–107)
CO2 SERPL-SCNC: 30 MMOL/L (ref 21–32)
CREAT SERPL-MCNC: 0.8 MG/DL (ref 0.6–1)
EOSINOPHIL NFR BLD: 1.3 % (ref 0–3)
ERYTHROCYTE [DISTWIDTH] IN BLOOD BY AUTOMATED COUNT: 18.1 % (ref 10.5–14.5)
GLUCOSE SERPL-MCNC: 104 MG/DL (ref 74–106)
GRANULOCYTES NFR BLD MANUAL: 71.7 % (ref 36–66)
HCT VFR BLD CALC: 30.9 % (ref 37–47)
HGB BLD-MCNC: 9.9 GM/DL (ref 12–15)
LYMPHOCYTES NFR BLD AUTO: 20.6 % (ref 24–44)
MAGNESIUM SERPL-MCNC: 2.1 MG/DL (ref 1.8–2.4)
MCH RBC QN AUTO: 27.4 PG (ref 26–34)
MCHC RBC AUTO-ENTMCNC: 32 G/DL (ref 28–37)
MCV RBC: 85.8 FL (ref 80–100)
MONOCYTES NFR BLD: 6 % (ref 1–8)
NEUTROPHILS # BLD: 8.8 THOU/UL (ref 1.4–8.2)
PLATELET # BLD: 203 THOU/UL (ref 150–400)
POTASSIUM SERPL-SCNC: 3.9 MMOL/L (ref 3.5–5.1)
RBC # BLD AUTO: 3.6 MIL/UL (ref 4.2–5)
SODIUM SERPL-SCNC: 139 MMOL/L (ref 136–145)
WBC # BLD AUTO: 12.3 THOU/UL (ref 4–11)

## 2020-05-19 NOTE — NUR
INCONTINENT WHILE SITTNG IN CHAIR, UP TO TOILET WITH STANDBY ASSIST PRIOR TO
GETTING READY FOR BED. SSI FOR BLOOD SUGAR , DECLINES BEDTIME SNACK. HAD
BM, DECLINED LAXATIVES. LOOKING FORWARD TO GOING HOME SOON

## 2020-05-19 NOTE — NUR
ASSUMED CARE AT 0700. PATIENT IS ALERT AND ORIENTED X4. PATIENT IS [place].
REPORTS SLEPT BETTER LAST NIGHT.  REASSESSMENT PER CHART. LUNGS ARE CLEAR. ABD
IS SOFT WITH BSX4.  REFUSED LAXATIVES. HAD BM TODAY. ASSISTED TO BATHROOM.
CONT BLADDER 4X.  WET 2X. OFFERED SUPPORTIVE CARE. ENCOURAGED PT TO VOICE HER
NEEDS. BS MONITOR, INSULIN AND MEDS GIVEN AS ORDERED.  FALL AND SAFETY
PROTOCOLS IN PLACE.  PT C/O BACK AND L KNEE PAIN, GAVE PRN HYDROCODONE THIS
AM. APPLIED VOLTARENE GEL. REFUSES VOLTARE GEL SOMETIMES.  UP AND PARTICIPATED
WITH THERAPY.CONTINUES TO PROGRESS TOWARDS D/C GOALS. PATIENT IS INCONTINENT
OF URINE AT TIMES. PATIENT IS UP WITH ASSIST OF 1 STAFF WITH GAIT BELT AND
WALKER T0 THE BATHROOM. VSS ON RA DURING DAY. USES PRN OXYGEN AT NIGHT. GAVE
REPORT TO NIGHT NURSE TO CONTINUE TO MONITOR. PT WILL BE DISCHARGE HOME WITH
HH TOMORROW. ENCOURAGED PT DO MORE THING FOR HERSELF RATHER LET HER SON DO IT
FOR HER. PT AGREES THAT HER SON TRIED TO DO MORE THAN HE HAS TO.

## 2020-05-20 VITALS — SYSTOLIC BLOOD PRESSURE: 137 MMHG | DIASTOLIC BLOOD PRESSURE: 72 MMHG

## 2020-05-20 VITALS — DIASTOLIC BLOOD PRESSURE: 72 MMHG | SYSTOLIC BLOOD PRESSURE: 137 MMHG

## 2020-05-20 VITALS — SYSTOLIC BLOOD PRESSURE: 147 MMHG | DIASTOLIC BLOOD PRESSURE: 62 MMHG

## 2020-05-20 NOTE — NUR
PT ALERT AND ORIENTED X 4.  UP IN RECLINER ALL EVENING.  ASSISTED TO BED AT HS
WITH 1 ASSIST.  BLOOD SUGAR 166 AT HS.  INSULIN GIVEN AS ORDERED.  PT DENIES
PAIN OR DISCOMFORT.  BED ALARM ON FOR SAFETY.  PT APPEARS TO BE SLEEPING ON
HOURLY ROUNDS.

## 2020-05-20 NOTE — NUR
Pt dcing today with HH. Dtr in law to pickup at the front door at 1300.
Message left for dtr in law to confirm the time and to confirm f/u appt with
neuro. Appt made for 6/8/20 at 1530  with Dr.Kumar huntley at [facility].
It has been noted in the pt's dc instructions. Gil Puga has rec'd
her hh orders and will f/u in 1-2 days.

## 2020-05-20 NOTE — NUR
ASSUMED CARE OF PT AT 0700. PT IS A&OX4 AND VITAL SIGNS ARE STABLE. PT DENIES
PAIN AND PARTICIPATED IN SCHEDULED THERAPIES. ORDERS FOR D/C TODAY. SCRIPTS
FAXED TO PHARMACY AND [employer] SCRIPT SENT WITH PT IN [place]. DISCHARGE
INSTRUCTIONS INCLUDING F/U APPOINTMENTS, MEDICAITON LIST, AND DISCHARGE
INSTRUCTIONS WITH PT. EDUCATION HANDOUTS REVIEWED AND PROVIDED FOR PATIENT. PT
BELONGINGS REMOVED FROM ROOM AT TIME OF DISCHARGE. PT LEFT UNIT AT
APPROXIMATELY 1330 WITH NURSING STAFF TO MAIN ENTERENCE.

## 2020-06-11 ENCOUNTER — HOSPITAL ENCOUNTER (INPATIENT)
Dept: HOSPITAL 35 - ER | Age: 75
LOS: 2 days | Discharge: HOME HEALTH | DRG: 914 | End: 2020-06-13
Attending: INTERNAL MEDICINE | Admitting: INTERNAL MEDICINE
Payer: COMMERCIAL

## 2020-06-11 VITALS — BODY MASS INDEX: 36.86 KG/M2 | HEIGHT: 62.99 IN | WEIGHT: 208 LBS

## 2020-06-11 VITALS — SYSTOLIC BLOOD PRESSURE: 183 MMHG | DIASTOLIC BLOOD PRESSURE: 79 MMHG

## 2020-06-11 VITALS — DIASTOLIC BLOOD PRESSURE: 85 MMHG | SYSTOLIC BLOOD PRESSURE: 187 MMHG

## 2020-06-11 VITALS — SYSTOLIC BLOOD PRESSURE: 170 MMHG | DIASTOLIC BLOOD PRESSURE: 76 MMHG

## 2020-06-11 DIAGNOSIS — I48.0: ICD-10-CM

## 2020-06-11 DIAGNOSIS — I50.9: ICD-10-CM
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
## 2020-06-11 DIAGNOSIS — F41.9: ICD-10-CM

## 2020-06-11 DIAGNOSIS — Z87.891: ICD-10-CM

## 2020-06-11 DIAGNOSIS — M19.90: ICD-10-CM

## 2020-06-11 DIAGNOSIS — Z80.0: ICD-10-CM

## 2020-06-11 DIAGNOSIS — Z90.710: ICD-10-CM

## 2020-06-11 DIAGNOSIS — R63.4: ICD-10-CM

## 2020-06-11 DIAGNOSIS — F32.9: ICD-10-CM

## 2020-06-11 DIAGNOSIS — G89.29: ICD-10-CM

## 2020-06-11 DIAGNOSIS — Z82.3: ICD-10-CM

## 2020-06-11 DIAGNOSIS — I11.0: ICD-10-CM

## 2020-06-11 DIAGNOSIS — E87.6: ICD-10-CM

## 2020-06-11 DIAGNOSIS — E78.00: ICD-10-CM
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
## 2020-06-11 DIAGNOSIS — J44.9: ICD-10-CM

## 2020-06-11 DIAGNOSIS — E83.42: ICD-10-CM

## 2020-06-11 DIAGNOSIS — G47.00: ICD-10-CM

## 2020-06-11 DIAGNOSIS — I27.20: ICD-10-CM

## 2020-06-11 DIAGNOSIS — K21.9: ICD-10-CM
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
## 2020-06-11 DIAGNOSIS — E11.65: ICD-10-CM

## 2020-06-11 DIAGNOSIS — E87.8: ICD-10-CM

## 2020-06-11 DIAGNOSIS — S09.93XA: Primary | ICD-10-CM

## 2020-06-11 DIAGNOSIS — Z86.73: ICD-10-CM

## 2020-06-11 DIAGNOSIS — M54.9: ICD-10-CM
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
## 2020-06-11 LAB
ANION GAP SERPL CALC-SCNC: 5 MMOL/L (ref 7–16)
BASOPHILS NFR BLD AUTO: 0.5 % (ref 0–2)
BILIRUB UR-MCNC: NEGATIVE MG/DL
BUN SERPL-MCNC: 10 MG/DL (ref 7–18)
CALCIUM SERPL-MCNC: 9.2 MG/DL (ref 8.5–10.1)
CHLORIDE SERPL-SCNC: 98 MMOL/L (ref 98–107)
CHOLEST SERPL-MCNC: 127 MG/DL (ref ?–200)
CO2 SERPL-SCNC: 35 MMOL/L (ref 21–32)
COLOR UR: YELLOW
CREAT SERPL-MCNC: 1 MG/DL (ref 0.6–1)
EOSINOPHIL NFR BLD: 0.3 % (ref 0–3)
ERYTHROCYTE [DISTWIDTH] IN BLOOD BY AUTOMATED COUNT: 17.2 % (ref 10.5–14.5)
GLUCOSE SERPL-MCNC: 207 MG/DL (ref 74–106)
GRANULOCYTES NFR BLD MANUAL: 82.7 % (ref 36–66)
HCT VFR BLD CALC: 37.2 % (ref 37–47)
HDLC SERPL-MCNC: 71 MG/DL (ref 40–?)
HGB BLD-MCNC: 11.8 GM/DL (ref 12–15)
KETONES UR STRIP-MCNC: NEGATIVE MG/DL
LDLC SERPL-MCNC: 28 MG/DL (ref ?–100)
LYMPHOCYTES NFR BLD AUTO: 11.6 % (ref 24–44)
MCH RBC QN AUTO: 26.7 PG (ref 26–34)
MCHC RBC AUTO-ENTMCNC: 31.8 G/DL (ref 28–37)
MCV RBC: 83.9 FL (ref 80–100)
MONOCYTES NFR BLD: 4.9 % (ref 1–8)
NEUTROPHILS # BLD: 11.2 THOU/UL (ref 1.4–8.2)
PLATELET # BLD: 198 THOU/UL (ref 150–400)
POTASSIUM SERPL-SCNC: 2.7 MMOL/L (ref 3.5–5.1)
RBC # BLD AUTO: 4.43 MIL/UL (ref 4.2–5)
RBC # UR STRIP: NEGATIVE /UL
SODIUM SERPL-SCNC: 138 MMOL/L (ref 136–145)
SP GR UR STRIP: 1.01 (ref 1–1.03)
TC:HDL: 1.8 RATIO
TRIGL SERPL-MCNC: 143 MG/DL (ref ?–150)
TROPONIN I SERPL-MCNC: <0.06 NG/ML (ref ?–0.06)
URINE CLARITY: CLEAR
URINE GLUCOSE-RANDOM*: NEGATIVE
URINE LEUKOCYTES-REFLEX: NEGATIVE
URINE NITRITE-REFLEX: NEGATIVE
URINE PROTEIN (DIPSTICK): NEGATIVE
UROBILINOGEN UR STRIP-ACNC: 0.2 E.U./DL (ref 0.2–1)
VLDLC SERPL CALC-MCNC: 29 MG/DL (ref ?–40)
WBC # BLD AUTO: 13.5 THOU/UL (ref 4–11)

## 2020-06-11 PROCEDURE — 10047: CPT

## 2020-06-11 PROCEDURE — 10045: CPT

## 2020-06-12 VITALS — SYSTOLIC BLOOD PRESSURE: 122 MMHG | DIASTOLIC BLOOD PRESSURE: 40 MMHG

## 2020-06-12 VITALS — DIASTOLIC BLOOD PRESSURE: 76 MMHG | SYSTOLIC BLOOD PRESSURE: 166 MMHG

## 2020-06-12 VITALS — DIASTOLIC BLOOD PRESSURE: 59 MMHG | SYSTOLIC BLOOD PRESSURE: 144 MMHG

## 2020-06-12 VITALS — SYSTOLIC BLOOD PRESSURE: 155 MMHG | DIASTOLIC BLOOD PRESSURE: 72 MMHG

## 2020-06-12 LAB
ANION GAP SERPL CALC-SCNC: 6 MMOL/L (ref 7–16)
BASOPHILS NFR BLD AUTO: 0.2 % (ref 0–2)
BUN SERPL-MCNC: 8 MG/DL (ref 7–18)
CALCIUM SERPL-MCNC: 8.5 MG/DL (ref 8.5–10.1)
CHLORIDE SERPL-SCNC: 102 MMOL/L (ref 98–107)
CO2 SERPL-SCNC: 35 MMOL/L (ref 21–32)
CREAT SERPL-MCNC: 0.6 MG/DL (ref 0.6–1)
EOSINOPHIL NFR BLD: 1.1 % (ref 0–3)
ERYTHROCYTE [DISTWIDTH] IN BLOOD BY AUTOMATED COUNT: 17.3 % (ref 10.5–14.5)
GLUCOSE SERPL-MCNC: 118 MG/DL (ref 74–106)
GRANULOCYTES NFR BLD MANUAL: 74.2 % (ref 36–66)
HCT VFR BLD CALC: 34.8 % (ref 37–47)
HGB BLD-MCNC: 11.1 GM/DL (ref 12–15)
LYMPHOCYTES NFR BLD AUTO: 19.1 % (ref 24–44)
MAGNESIUM SERPL-MCNC: 2.2 MG/DL (ref 1.8–2.4)
MCH RBC QN AUTO: 27.1 PG (ref 26–34)
MCHC RBC AUTO-ENTMCNC: 31.9 G/DL (ref 28–37)
MCV RBC: 85 FL (ref 80–100)
MONOCYTES NFR BLD: 5.4 % (ref 1–8)
NEUTROPHILS # BLD: 7.8 THOU/UL (ref 1.4–8.2)
PLATELET # BLD: 187 THOU/UL (ref 150–400)
POTASSIUM SERPL-SCNC: 2.5 MMOL/L (ref 3.5–5.1)
RBC # BLD AUTO: 4.1 MIL/UL (ref 4.2–5)
SODIUM SERPL-SCNC: 143 MMOL/L (ref 136–145)
WBC # BLD AUTO: 10.5 THOU/UL (ref 4–11)

## 2020-06-12 NOTE — NUR
A/O, calm and pleasant; bed rest; MRI head done. On room air, breathing
treatment.  complains of head pain in the morning, pain medicatin given and
worked. no n/v. patient talking about wanting to go home. appetite fair.

## 2020-06-12 NOTE — EKG
[facility]
[address]
[place]   [address]                     ELECTROCARDIOGRAM REPORT      
_______________________________________________________________________________
 
Name:       FORTUNATOTERENCE GOMEZ              Room #:         452-P       ADM IN  
M.R.#:      [id number]                       Account #:      [id number]  
Admission:  20    Attend Phys:    Monae Pitt
Discharge:              Date of Birth:  [birth year]  
                                                          Report #: [id number]
                                                                    [id number]
_______________________________________________________________________________
THIS REPORT FOR:  
 
cc:  ROYER - No family physician/PCP 
     FAM - No family physician/PCP 
     Lalo Bailey MD                                            ~
THIS REPORT FOR:   //name//                          
 
                         [facility] ED
                                       
Test Date:    2020               Test Time:    18:16:12
Pat Name:     TERENCE BUCIO           Department:   
Patient ID:   [id number]            Room:         [facility]
Gender:       F                        Technician:   [facility]
:          1945               Requested By: Steven Lopez
Order Number: [id number] MD:   Lalo Bailey
                                 Measurements
Intervals                              Axis          
Rate:         108                      P:            33
MD:           182                      QRS:          -27
QRSD:         110                      T:            88
QT:           355                                    
QTc:          476                                    
                           Interpretive Statements
Sinus tachycardia
LVH with secondary repolarization abnormality
Compared to ECG 2020 15:43:01
Left ventricular hypertrophy now present
Early repolarization now present
Sinus rhythm no longer present
Poor R-wave progression no longer present
 
Electronically Signed On 2020 7:59:25 CDT by Lalo Bailey
[email or web address]
 
 
 
 
 
 
 
 
 
 
 
  <ELECTRONICALLY SIGNED>
   By: Lalo Bailey MD        
  20     0759
D: 20                           _____________________________________
T: 20                           Lalo Bailey MD          /EPI

## 2020-06-12 NOTE — NUR
ADMITTED FROM ER UNDER 'S CARE. VSS. CRITICAL K+ REPORTED TO
NOAH LINDSEY ON CALL FOR  AND NEW ORDERS RECEIVED. CARE TRANSFERRED
TO AM RN.

## 2020-06-12 NOTE — NUR
PT ADMITTED RELATED TO [place]REQU[place]ALLS. CM REVIEWED CHART AND SPOKE WITH CARE
TEAM. CM CALLED AND SPOKE WITH PT AT BEDSIDE THIS DAY. PT APPEAED TO BE A&O
X4. CM ROLE INTRODCUED. PT INDICATED SHE LIVES IN A HOUSE AND THAT HER SON
SARAN AND DTR IN LAW RESIDE WITH HER. SHE INDICATED NO STEPS TO ENTER OR
INSIDE. PT INDICATED SHE HAD A FWW FOR HOME USE. PREVIOUSE RECORDS ALSO
MENTION CANE AND WHEELCHAIR. PT'S PCP IS DR. ADRIANA ALONZO. PT INICATED SHE [facility] AND THAT SHE WOULD USE THEM AGAIN
UPON DC. CM CALLED AND SPOKE WITH PT'S SON GLORIA HE ASKED THAT CM CALL HIM
BACK. CM CALLED AND LEFT VM WIT SON SARAN. PT IS TO HAVE MRI OF HEAD WITHOUT
CONTRAST THIS DAY. CM FOLLOWING REGARDNING DC PLANNING.

## 2020-06-13 VITALS — SYSTOLIC BLOOD PRESSURE: 159 MMHG | DIASTOLIC BLOOD PRESSURE: 77 MMHG

## 2020-06-13 VITALS — SYSTOLIC BLOOD PRESSURE: 162 MMHG | DIASTOLIC BLOOD PRESSURE: 74 MMHG

## 2020-06-13 LAB
ANION GAP SERPL CALC-SCNC: 1 MMOL/L (ref 7–16)
BUN SERPL-MCNC: 11 MG/DL (ref 7–18)
CALCIUM SERPL-MCNC: 8.9 MG/DL (ref 8.5–10.1)
CHLORIDE SERPL-SCNC: 102 MMOL/L (ref 98–107)
CO2 SERPL-SCNC: 36 MMOL/L (ref 21–32)
CREAT SERPL-MCNC: 0.7 MG/DL (ref 0.6–1)
EST. AVERAGE GLUCOSE BLD GHB EST-MCNC: 137 MG/DL
GLUCOSE SERPL-MCNC: 140 MG/DL (ref 74–106)
GLYCOHEMOGLOBIN (HGB A1C): 6.4 % (ref 4.8–5.6)
MAGNESIUM SERPL-MCNC: 1.9 MG/DL (ref 1.8–2.4)
POTASSIUM SERPL-SCNC: 4.5 MMOL/L (ref 3.5–5.1)
SODIUM SERPL-SCNC: 139 MMOL/L (ref 136–145)

## 2020-06-13 NOTE — NUR
Received awake on bed. Due medication given as prescribed, able to swallow
meds w/o difficulty. On room air, refusing breathing treatments- saturating
95%. On carb controlled diet- tolerating well; no nausea, no vomiting and no
abdominal pain. On blood sugar monitoring- taken and recorded accordingly;
with sliding scale insulin ordered- given as prescribed. With external madrigal
in place- output measured and recorded accordingly.

## 2020-06-13 NOTE — NUR
Pt. rested quietly at intervals during the night when checked on during
frequent rounds.  She requested something additionally for sleep and
trazodone order received from [facility] (see cpoe).  Bed alarm is on.

## 2020-07-22 ENCOUNTER — HOSPITAL ENCOUNTER (INPATIENT)
Dept: HOSPITAL 35 - ER | Age: 75
LOS: 2 days | Discharge: HOME HEALTH | DRG: 189 | End: 2020-07-24
Attending: INTERNAL MEDICINE | Admitting: INTERNAL MEDICINE
Payer: COMMERCIAL

## 2020-07-22 VITALS — WEIGHT: 217 LBS | HEIGHT: 62.99 IN | BODY MASS INDEX: 38.45 KG/M2

## 2020-07-22 VITALS — SYSTOLIC BLOOD PRESSURE: 152 MMHG | DIASTOLIC BLOOD PRESSURE: 64 MMHG

## 2020-07-22 VITALS — DIASTOLIC BLOOD PRESSURE: 82 MMHG | SYSTOLIC BLOOD PRESSURE: 179 MMHG

## 2020-07-22 DIAGNOSIS — Z79.899: ICD-10-CM

## 2020-07-22 DIAGNOSIS — F41.9: ICD-10-CM

## 2020-07-22 DIAGNOSIS — Z03.818: ICD-10-CM

## 2020-07-22 DIAGNOSIS — I27.20: ICD-10-CM

## 2020-07-22 DIAGNOSIS — E78.00: ICD-10-CM

## 2020-07-22 DIAGNOSIS — I10: ICD-10-CM

## 2020-07-22 DIAGNOSIS — R65.11: ICD-10-CM

## 2020-07-22 DIAGNOSIS — J96.21: Primary | ICD-10-CM

## 2020-07-22 DIAGNOSIS — Z86.14: ICD-10-CM

## 2020-07-22 DIAGNOSIS — Z90.710: ICD-10-CM

## 2020-07-22 DIAGNOSIS — E87.6: ICD-10-CM

## 2020-07-22 DIAGNOSIS — E11.9: ICD-10-CM

## 2020-07-22 DIAGNOSIS — E78.5: ICD-10-CM

## 2020-07-22 DIAGNOSIS — Z87.891: ICD-10-CM

## 2020-07-22 DIAGNOSIS — Z86.73: ICD-10-CM

## 2020-07-22 DIAGNOSIS — I48.0: ICD-10-CM

## 2020-07-22 DIAGNOSIS — J45.909: ICD-10-CM

## 2020-07-22 DIAGNOSIS — J44.1: ICD-10-CM

## 2020-07-22 LAB
ALBUMIN SERPL-MCNC: 2.8 G/DL (ref 3.4–5)
ALT SERPL-CCNC: 17 U/L (ref 14–59)
ANION GAP SERPL CALC-SCNC: 7 MMOL/L (ref 7–16)
AST SERPL-CCNC: 13 U/L (ref 15–37)
BASOPHILS NFR BLD AUTO: 0 % (ref 0–2)
BILIRUB DIRECT SERPL-MCNC: 0.2 MG/DL
BILIRUB SERPL-MCNC: 0.4 MG/DL (ref 0.2–1)
BUN SERPL-MCNC: 8 MG/DL (ref 7–18)
CALCIUM SERPL-MCNC: 9 MG/DL (ref 8.5–10.1)
CHLORIDE SERPL-SCNC: 99 MMOL/L (ref 98–107)
CO2 SERPL-SCNC: 32 MMOL/L (ref 21–32)
CREAT SERPL-MCNC: 0.7 MG/DL (ref 0.6–1)
EOSINOPHIL NFR BLD: 1 % (ref 0–3)
ERYTHROCYTE [DISTWIDTH] IN BLOOD BY AUTOMATED COUNT: 17 % (ref 10.5–14.5)
GLUCOSE SERPL-MCNC: 158 MG/DL (ref 74–106)
GRANULOCYTES NFR BLD MANUAL: 71 % (ref 36–66)
HCT VFR BLD CALC: 32.7 % (ref 37–47)
HGB BLD-MCNC: 10.3 GM/DL (ref 12–15)
LYMPHOCYTES NFR BLD AUTO: 17 % (ref 24–44)
MCH RBC QN AUTO: 26.3 PG (ref 26–34)
MCHC RBC AUTO-ENTMCNC: 31.5 G/DL (ref 28–37)
MCV RBC: 83.6 FL (ref 80–100)
MONOCYTES NFR BLD: 10 % (ref 1–8)
NEUTROPHILS # BLD: 10.2 THOU/UL (ref 1.4–8.2)
NEUTS BAND NFR BLD: 1 % (ref 0–8)
PLATELET # BLD EST: NORMAL 10*3/UL
PLATELET # BLD: 169 THOU/UL (ref 150–400)
POTASSIUM SERPL-SCNC: 2.9 MMOL/L (ref 3.5–5.1)
PROT SERPL-MCNC: 6.2 G/DL (ref 6.4–8.2)
RBC # BLD AUTO: 3.91 MIL/UL (ref 4.2–5)
RBC MORPH BLD: NORMAL
SODIUM SERPL-SCNC: 138 MMOL/L (ref 136–145)
TROPONIN I SERPL-MCNC: <0.06 NG/ML (ref ?–0.06)
WBC # BLD AUTO: 14.2 THOU/UL (ref 4–11)

## 2020-07-22 PROCEDURE — 10080 I&D PILONIDAL CYST SIMPLE: CPT

## 2020-07-23 VITALS — SYSTOLIC BLOOD PRESSURE: 154 MMHG | DIASTOLIC BLOOD PRESSURE: 84 MMHG

## 2020-07-23 VITALS — DIASTOLIC BLOOD PRESSURE: 49 MMHG | SYSTOLIC BLOOD PRESSURE: 127 MMHG

## 2020-07-23 VITALS — DIASTOLIC BLOOD PRESSURE: 61 MMHG | SYSTOLIC BLOOD PRESSURE: 133 MMHG

## 2020-07-23 VITALS — DIASTOLIC BLOOD PRESSURE: 83 MMHG | SYSTOLIC BLOOD PRESSURE: 155 MMHG

## 2020-07-23 VITALS — SYSTOLIC BLOOD PRESSURE: 134 MMHG | DIASTOLIC BLOOD PRESSURE: 65 MMHG

## 2020-07-23 VITALS — SYSTOLIC BLOOD PRESSURE: 179 MMHG | DIASTOLIC BLOOD PRESSURE: 82 MMHG

## 2020-07-23 LAB
ANION GAP SERPL CALC-SCNC: 10 MMOL/L (ref 7–16)
BUN SERPL-MCNC: 11 MG/DL (ref 7–18)
CALCIUM SERPL-MCNC: 8.8 MG/DL (ref 8.5–10.1)
CHLORIDE SERPL-SCNC: 98 MMOL/L (ref 98–107)
CO2 SERPL-SCNC: 26 MMOL/L (ref 21–32)
CREAT SERPL-MCNC: 1 MG/DL (ref 0.6–1)
ERYTHROCYTE [DISTWIDTH] IN BLOOD BY AUTOMATED COUNT: 17.1 % (ref 10.5–14.5)
GLUCOSE SERPL-MCNC: 365 MG/DL (ref 74–106)
HCT VFR BLD CALC: 33.5 % (ref 37–47)
HGB BLD-MCNC: 10.6 GM/DL (ref 12–15)
MAGNESIUM SERPL-MCNC: 1.6 MG/DL (ref 1.8–2.4)
MCH RBC QN AUTO: 26.5 PG (ref 26–34)
MCHC RBC AUTO-ENTMCNC: 31.7 G/DL (ref 28–37)
MCV RBC: 83.6 FL (ref 80–100)
PLATELET # BLD: 212 THOU/UL (ref 150–400)
POTASSIUM SERPL-SCNC: 4.2 MMOL/L (ref 3.5–5.1)
RBC # BLD AUTO: 4.01 MIL/UL (ref 4.2–5)
SODIUM SERPL-SCNC: 134 MMOL/L (ref 136–145)
WBC # BLD AUTO: 8.5 THOU/UL (ref 4–11)

## 2020-07-23 NOTE — NUR
ASSUMED PATIENT CARE AT 0700. A/O X4. AFEBRILE. VSS. COVID TEST NEGATIVE. C/O
BACK PAIN. GENERLIZED EDEMA. SLOWLY TOWARDS POC GOALS. WILL TRANSFER TO [facility]
SOON.

## 2020-07-23 NOTE — EKG
[facility]
[address]
[place]   [address]                     ELECTROCARDIOGRAM REPORT      
_______________________________________________________________________________
 
Name:       TERENCE BUCIO              Room #:         360-P       ADM IN  
M.R.#:      [id number]                       Account #:      [id number]  
Admission:  20    Attend Phys:    Quique Jason MD      
Discharge:              Date of Birth:  [birth year]  
                                                          Report #: [id number]
                                                                    [id number]
_______________________________________________________________________________
THIS REPORT FOR:  
 
cc:  ROYER - No family physician/PCP 
     ROYER - No family physician/PCP 
     Baudilio Nicole MD [facility]
THIS REPORT FOR:   //name//                          
 
                         [facility] ED
                                       
Test Date:    2020               Test Time:    19:09:24
Pat Name:     TERENCE BUCIO           Department:   
Patient ID:   [id number]            Room:         [facility]
Gender:       F                        Technician:   TELMA
:          1945               Requested By: Layla Moon
Order Number: [id number] MD:   Baudilio Nicole
                                 Measurements
Intervals                              Axis          
Rate:         97                       P:            35
MN:           220                      QRS:          -22
QRSD:         107                      T:            48
QT:           372                                    
QTc:          473                                    
                           Interpretive Statements
Sinus rhythm
Prolonged MN interval
Abnormal R-wave progression, late transition
Compared to ECG 2020 18:16:12
Sinus tachycardia no longer present
Electronically Signed On 2020 8:06:09 CDT by Baudilio Nicole
[email or web address]
 
 
 
 
 
 
 
 
 
 
 
 
 
 
  <ELECTRONICALLY SIGNED>
   By: Baudilio Nicole MD, [facility]   
  20     0806
D: 20 1909                           _____________________________________
T: 20 1909                           Baudilio Nicole MD, [facility]     /EPI

## 2020-07-23 NOTE — NUR
PT RESTING IN BED WATCHING TV. NO LONGER IN ISOLATION, RESPIRATORY PROVIDED
TREATMENT. O2 PER NC 2L.
PT REQUESTED SLEEPING AID AND PROVIDED. PT REQUESTED WALKER TO
AMBULATE TO RESTROOM IF SHE NEEDS TO HAVE BM AND PROVIDED. PT REPORTED AN
INCREASE IN ARM AND LEG TREMORS TODAY.
PT VERBALIZED PLAN
FOR DC IN AM. PT DISCUSSED HOW SHE HAD LOST HER  OVER A YEAR AGO TO AN
ENLARGED HEART AND HOW SHE HAS LIVED IN THE SAME HOUSE SINCE 1969.

## 2020-07-23 NOTE — NUR
INITIAL ASSESSMENT:
SW reviewed chart and spoke with nursing and attending physician. Pt was
admitted from home due to COPD exacerbation. Pt placed in Enhanced Isolation
to r/o COVID-19. Pt's test is negative. Pt is on IV steroids. Pt is afebrile
and on 3L of O2. SW spoke with pt via phone. Introduced role of SW. Pt is
alert/orientated x 4 and known to SW from prior hospitalizations. Pt reports
she lives at home with her son and dtr-in-law. Prior to admission, pt was
using a walker. 1 step to enter the home. No steps inside. Pt has an oxygen
concentrator that she uses at nighttime. Pt has used [facility] in
the past and has been to [facility]. Pt's PCP is Dr. Anatoliy Wheat. Pt's goal is to return home when medically stable. SW is following to
assist as needed with discharge planning.

## 2020-07-24 VITALS — DIASTOLIC BLOOD PRESSURE: 72 MMHG | SYSTOLIC BLOOD PRESSURE: 166 MMHG

## 2020-07-24 VITALS — DIASTOLIC BLOOD PRESSURE: 73 MMHG | SYSTOLIC BLOOD PRESSURE: 147 MMHG

## 2020-07-24 VITALS — SYSTOLIC BLOOD PRESSURE: 147 MMHG | DIASTOLIC BLOOD PRESSURE: 73 MMHG

## 2020-07-24 NOTE — NUR
PATIENT DISCHARGED AT THIS TIME TO HOME.NURIS HERE TO PICK HER UP TO
[facility]. IS IS ALERTO ORIENTED X4. PLEASANT WITH CARES. DOES NOT SEEM TO BE
INPAIN AT THIS TIME. EDUCATED DON NEW MEDICATIONS AND SHE UNDERSTOOD. WILL
CONT WITH PLAN OF CARE.

## 2020-07-24 NOTE — NUR
DISCHARGE NOTE:
SW reviewed chart and spoke with nursing and attending physician. Pt's COVID
test was negative. Pt is medically stable to discharge home today with 
services. Pt had discharged prior to SW confirming HH services. [facility] faxed
clinical info/COVID test results/[facility] ppwk [facility]. Pt
has used this HH agency in the past. Per Karie in intake, they are unable
to accept on service, if pt needs to be seen over the weekend. Will need
additional documentation from physician to state pt can be seen next week. [facility]
left voice message for pt to inform. SW faxed referral to [facility]  for
review. Notified  liaison of new referral. [facility]  is able to accept pt
on service. Liaison has already reached out to pt. No additional SW needs
identified at this time, but is available to assist should needs arise.

## 2020-08-08 ENCOUNTER — HOSPITAL ENCOUNTER (EMERGENCY)
Dept: HOSPITAL 35 - ER | Age: 75
LOS: 1 days | Discharge: HOME | End: 2020-08-09
Payer: COMMERCIAL

## 2020-08-08 VITALS — BODY MASS INDEX: 37.21 KG/M2 | HEIGHT: 63 IN | WEIGHT: 210.01 LBS

## 2020-08-08 DIAGNOSIS — G89.29: Primary | ICD-10-CM

## 2020-08-08 DIAGNOSIS — Z79.899: ICD-10-CM

## 2020-08-08 DIAGNOSIS — M54.5: ICD-10-CM

## 2020-08-08 DIAGNOSIS — E78.5: ICD-10-CM

## 2020-08-08 DIAGNOSIS — I50.9: ICD-10-CM

## 2020-08-08 DIAGNOSIS — J44.9: ICD-10-CM

## 2020-08-08 DIAGNOSIS — Z90.89: ICD-10-CM

## 2020-08-08 DIAGNOSIS — E11.9: ICD-10-CM

## 2020-08-08 DIAGNOSIS — Z20.828: ICD-10-CM

## 2020-08-08 DIAGNOSIS — I11.0: ICD-10-CM

## 2020-08-08 LAB
ALBUMIN SERPL-MCNC: 3.1 G/DL (ref 3.4–5)
ALT SERPL-CCNC: 16 U/L (ref 30–65)
ANION GAP SERPL CALC-SCNC: 6 MMOL/L (ref 7–16)
ANISOCYTOSIS BLD QL SMEAR: (no result)
AST SERPL-CCNC: 18 U/L (ref 15–37)
BILIRUB SERPL-MCNC: 0.4 MG/DL (ref 0.2–1)
BUN SERPL-MCNC: 9 MG/DL (ref 7–18)
CALCIUM SERPL-MCNC: 9.3 MG/DL (ref 8.5–10.1)
CHLORIDE SERPL-SCNC: 104 MMOL/L (ref 98–107)
CO2 SERPL-SCNC: 33 MMOL/L (ref 21–32)
CREAT SERPL-MCNC: 0.7 MG/DL (ref 0.6–1)
EOSINOPHIL NFR BLD: 3 % (ref 0–3)
ERYTHROCYTE [DISTWIDTH] IN BLOOD BY AUTOMATED COUNT: 16.6 % (ref 10.5–14.5)
GLUCOSE SERPL-MCNC: 119 MG/DL (ref 74–106)
GRANULOCYTES NFR BLD MANUAL: 68 % (ref 36–66)
HCT VFR BLD CALC: 38.8 % (ref 37–47)
HGB BLD-MCNC: 12.2 GM/DL (ref 12–15)
LYMPHOCYTES NFR BLD AUTO: 26 % (ref 24–44)
MAGNESIUM SERPL-MCNC: 1.7 MG/DL (ref 1.8–2.4)
MCH RBC QN AUTO: 26.4 PG (ref 26–34)
MCHC RBC AUTO-ENTMCNC: 31.3 G/DL (ref 28–37)
MCV RBC: 84.1 FL (ref 80–100)
MONOCYTES NFR BLD: 1 % (ref 1–8)
NEUTROPHILS # BLD: 5.8 THOU/UL (ref 1.4–8.2)
NEUTS BAND NFR BLD: 1 % (ref 0–8)
PLATELET # BLD: 186 THOU/UL (ref 150–400)
POTASSIUM SERPL-SCNC: 3.2 MMOL/L (ref 3.5–5.1)
PROT SERPL-MCNC: 6.1 G/DL (ref 6.4–8.2)
RBC # BLD AUTO: 4.61 MIL/UL (ref 4.2–5)
SODIUM SERPL-SCNC: 143 MMOL/L (ref 136–145)
TROPONIN I SERPL-MCNC: <0.06 NG/ML (ref ?–0.06)
VARIANT LYMPHS NFR BLD MANUAL: 1 %
WBC # BLD AUTO: 8.4 THOU/UL (ref 4–11)

## 2020-08-09 VITALS — SYSTOLIC BLOOD PRESSURE: 107 MMHG | DIASTOLIC BLOOD PRESSURE: 49 MMHG

## 2020-08-10 NOTE — EKG
[facility]
[address]
[place]   [address]                     ELECTROCARDIOGRAM REPORT      
_______________________________________________________________________________
 
Name:       TERENCE BUCIO              [id number]                       Account #:      [id number]  
Admission:  20    Attend Phys:                          
Discharge:  20    Date of Birth:  [birth year]  
                                                          Report #: [id number]
                                                                    [id number]
_______________________________________________________________________________
THIS REPORT FOR:  
 
cc:  Anatoliy Wheat MD, Bernard O. MD Lundgren,Baudilio JIANG MD [facility]                                        ~
THIS REPORT FOR:   //name//                          
 
                         [facility] ED
                                       
Test Date:    2020               Test Time:    16:30:14
Pat Name:     TERENCE BUCIO           Department:   
Patient ID:   [id number]            Room:          
Gender:       F                        Technician:   
:          1945               Requested By: Keagan Graham
Order Number: [id number] MD:   Baudilio Nicole
                                 Measurements
Intervals                              Axis          
Rate:         77                       P:            -82
NC:           182                      QRS:          -22
QRSD:         105                      T:            38
QT:           401                                    
QTc:          454                                    
                           Interpretive Statements
Sinus rhythm with first-degree AV block
Poor R wave progression
Baseline wander in lead(s) V5,V6
Compared to ECG 2020 19:17:55
Sinus tachycardia no longer present
Electronically Signed On 8- 8:29:37 CDT by Baudilio Nicole
[email or web address]
 
 
 
 
 
 
 
 
 
 
 
 
 
 
  <ELECTRONICALLY SIGNED>
   By: Baudilio Nicole MD, [facility]   
  08/10/20     0829
D: 20 1630                           _____________________________________
T: 20 163                           Baudilio Nicole MD, [facility]     /EPI

## 2020-08-18 ENCOUNTER — HOSPITAL ENCOUNTER (EMERGENCY)
Dept: HOSPITAL 35 - ER | Age: 75
Discharge: HOME | End: 2020-08-18
Payer: COMMERCIAL

## 2020-08-18 VITALS — SYSTOLIC BLOOD PRESSURE: 163 MMHG | DIASTOLIC BLOOD PRESSURE: 71 MMHG

## 2020-08-18 VITALS — WEIGHT: 250 LBS | BODY MASS INDEX: 44.3 KG/M2 | HEIGHT: 63 IN
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
## 2020-08-18 DIAGNOSIS — I11.0: ICD-10-CM

## 2020-08-18 DIAGNOSIS — F41.9: ICD-10-CM

## 2020-08-18 DIAGNOSIS — I50.9: ICD-10-CM
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
## 2020-08-18 DIAGNOSIS — J44.9: ICD-10-CM

## 2020-08-18 DIAGNOSIS — R42: ICD-10-CM

## 2020-08-18 DIAGNOSIS — E78.5: ICD-10-CM
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
## 2020-08-18 DIAGNOSIS — S40.022A: ICD-10-CM

## 2020-08-18 DIAGNOSIS — Z79.899: ICD-10-CM

## 2020-08-18 DIAGNOSIS — S80.11XA: Primary | ICD-10-CM

## 2020-08-18 DIAGNOSIS — F32.9: ICD-10-CM

## 2020-08-18 DIAGNOSIS — E11.51: ICD-10-CM

## 2020-08-18 DIAGNOSIS — R22.42: ICD-10-CM

## 2020-08-18 DIAGNOSIS — Z86.73: ICD-10-CM

## 2020-08-18 DIAGNOSIS — Z98.890: ICD-10-CM
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
## 2020-08-18 DIAGNOSIS — Z90.711: ICD-10-CM

## 2020-08-18 DIAGNOSIS — M54.2: ICD-10-CM

## 2020-08-18 DIAGNOSIS — M54.9: ICD-10-CM

## 2020-08-18 LAB
ALBUMIN SERPL-MCNC: 3 G/DL (ref 3.4–5)
ALT SERPL-CCNC: 18 U/L (ref 30–65)
ANION GAP SERPL CALC-SCNC: 4 MMOL/L (ref 7–16)
AST SERPL-CCNC: 18 U/L (ref 15–37)
BASOPHILS NFR BLD AUTO: 1.7 % (ref 0–2)
BILIRUB SERPL-MCNC: 0.5 MG/DL (ref 0.2–1)
BILIRUB UR-MCNC: NEGATIVE MG/DL
BUN SERPL-MCNC: 9 MG/DL (ref 7–18)
CALCIUM SERPL-MCNC: 9.4 MG/DL (ref 8.5–10.1)
CHLORIDE SERPL-SCNC: 100 MMOL/L (ref 98–107)
CO2 SERPL-SCNC: 38 MMOL/L (ref 21–32)
COLOR UR: YELLOW
CREAT SERPL-MCNC: 0.9 MG/DL (ref 0.6–1)
EOSINOPHIL NFR BLD: 4.4 % (ref 0–3)
ERYTHROCYTE [DISTWIDTH] IN BLOOD BY AUTOMATED COUNT: 16.2 % (ref 10.5–14.5)
GLUCOSE SERPL-MCNC: 161 MG/DL (ref 74–106)
GRANULOCYTES NFR BLD MANUAL: 57.7 % (ref 36–66)
HCT VFR BLD CALC: 36.1 % (ref 37–47)
HGB BLD-MCNC: 11.4 GM/DL (ref 12–15)
KETONES UR STRIP-MCNC: NEGATIVE MG/DL
LYMPHOCYTES NFR BLD AUTO: 27.4 % (ref 24–44)
MCH RBC QN AUTO: 26.1 PG (ref 26–34)
MCHC RBC AUTO-ENTMCNC: 31.7 G/DL (ref 28–37)
MCV RBC: 82.4 FL (ref 80–100)
MONOCYTES NFR BLD: 8.8 % (ref 1–8)
NEUTROPHILS # BLD: 4.1 THOU/UL (ref 1.4–8.2)
PLATELET # BLD: 210 THOU/UL (ref 150–400)
POTASSIUM SERPL-SCNC: 3.5 MMOL/L (ref 3.5–5.1)
PROT SERPL-MCNC: 6.5 G/DL (ref 6.4–8.2)
RBC # BLD AUTO: 4.38 MIL/UL (ref 4.2–5)
RBC # UR STRIP: NEGATIVE /UL
SODIUM SERPL-SCNC: 142 MMOL/L (ref 136–145)
SP GR UR STRIP: >= 1.03 (ref 1–1.03)
TROPONIN I SERPL-MCNC: <0.06 NG/ML (ref ?–0.06)
URINE CLARITY: CLEAR
URINE GLUCOSE-RANDOM*: NEGATIVE
URINE LEUKOCYTES-REFLEX: NEGATIVE
URINE NITRITE-REFLEX: NEGATIVE
URINE PROTEIN (DIPSTICK): NEGATIVE
UROBILINOGEN UR STRIP-ACNC: 0.2 E.U./DL (ref 0.2–1)
WBC # BLD AUTO: 7.1 THOU/UL (ref 4–11)

## 2020-08-20 NOTE — EKG
[facility]
[address]
[place]   [address]                     ELECTROCARDIOGRAM REPORT      
_______________________________________________________________________________
 
Name:       FORTUNATOTERENCE              [id number]                       Account #:      [id number]  
Admission:  20    Attend Phys:                          
Discharge:  20    Date of Birth:  [birth year]  
                                                          Report #: [id number]
                                                                    [id number]
_______________________________________________________________________________
THIS REPORT FOR:  
 
cc:  ROYER - Cindy family physician/PCP 
     FAM - No family physician/PCP 
     Lalo Bailey MD                                            ~
THIS REPORT FOR:   //name//                          
 
                         [facility] ED
                                       
Test Date:    2020               Test Time:    21:45:10
Pat Name:     TERENCE BUCIO           Department:   
Patient ID:   [id number]            Room:          
Gender:       F                        Technician:   [facility]
:          1945               Requested By: Scott Carr
Order Number: [id number] MD:   Lalo Bailey
                                 Measurements
Intervals                              Axis          
Rate:         96                       P:            258
IN:           166                      QRS:          -26
QRSD:         107                      T:            62
QT:           380                                    
QTc:          481                                    
                           Interpretive Statements
Ectopic atrial rhythm
Borderline left axis deviation
Compared to ECG 2020 16:30:14
Ectopic atrial rhythm now present
Sinus rhythm no longer present
Poor R-wave progression no longer present
Electronically Signed On 2020 8:14:24 CDT by Lalo Bailey
[email or web address]
 
 
 
 
 
 
 
 
 
 
 
 
 
  <ELECTRONICALLY SIGNED>
   By: Lalo Bailey MD        
  20
D: 20                           _____________________________________
T: 20                           Lalo Bailey MD          /EPI

## 2020-09-12 ENCOUNTER — HOSPITAL ENCOUNTER (EMERGENCY)
Dept: HOSPITAL 35 - ER | Age: 75
Discharge: HOME | End: 2020-09-12
Payer: COMMERCIAL

## 2020-09-12 VITALS — SYSTOLIC BLOOD PRESSURE: 159 MMHG | DIASTOLIC BLOOD PRESSURE: 94 MMHG

## 2020-09-12 VITALS — HEIGHT: 63 IN | WEIGHT: 250 LBS | BODY MASS INDEX: 44.3 KG/M2

## 2020-09-12 DIAGNOSIS — Z79.899: ICD-10-CM

## 2020-09-12 DIAGNOSIS — G89.29: ICD-10-CM

## 2020-09-12 DIAGNOSIS — J44.9: ICD-10-CM

## 2020-09-12 DIAGNOSIS — S70.01XA: ICD-10-CM
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
## 2020-09-12 DIAGNOSIS — M54.9: ICD-10-CM

## 2020-09-12 DIAGNOSIS — E11.9: ICD-10-CM

## 2020-09-12 DIAGNOSIS — S80.01XA: ICD-10-CM

## 2020-09-12 DIAGNOSIS — E78.5: ICD-10-CM

## 2020-09-12 DIAGNOSIS — S50.11XA: ICD-10-CM
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
## 2020-09-12 DIAGNOSIS — S40.011A: ICD-10-CM

## 2020-09-12 DIAGNOSIS — I50.9: ICD-10-CM
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
## 2020-09-12 DIAGNOSIS — Z90.710: ICD-10-CM
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
## 2020-09-12 DIAGNOSIS — S80.02XA: Primary | ICD-10-CM

## 2020-09-12 DIAGNOSIS — I11.0: ICD-10-CM

## 2020-09-12 LAB
ANION GAP SERPL CALC-SCNC: 8 MMOL/L (ref 7–16)
BASOPHILS NFR BLD AUTO: 0.7 % (ref 0–2)
BUN SERPL-MCNC: 22 MG/DL (ref 7–18)
CALCIUM SERPL-MCNC: 9 MG/DL (ref 8.5–10.1)
CHLORIDE SERPL-SCNC: 104 MMOL/L (ref 98–107)
CO2 SERPL-SCNC: 31 MMOL/L (ref 21–32)
CREAT SERPL-MCNC: 0.8 MG/DL (ref 0.6–1)
EOSINOPHIL NFR BLD: 4.2 % (ref 0–3)
ERYTHROCYTE [DISTWIDTH] IN BLOOD BY AUTOMATED COUNT: 16.4 % (ref 10.5–14.5)
GLUCOSE SERPL-MCNC: 76 MG/DL (ref 74–106)
GRANULOCYTES NFR BLD MANUAL: 74.2 % (ref 36–66)
HCT VFR BLD CALC: 35.6 % (ref 37–47)
HGB BLD-MCNC: 11.4 GM/DL (ref 12–15)
LYMPHOCYTES NFR BLD AUTO: 13.8 % (ref 24–44)
MCH RBC QN AUTO: 26.2 PG (ref 26–34)
MCHC RBC AUTO-ENTMCNC: 31.9 G/DL (ref 28–37)
MCV RBC: 82 FL (ref 80–100)
MONOCYTES NFR BLD: 7.1 % (ref 1–8)
NEUTROPHILS # BLD: 6.4 THOU/UL (ref 1.4–8.2)
PLATELET # BLD: 187 THOU/UL (ref 150–400)
POTASSIUM SERPL-SCNC: 5.6 MMOL/L (ref 3.5–5.1)
RBC # BLD AUTO: 4.34 MIL/UL (ref 4.2–5)
SODIUM SERPL-SCNC: 143 MMOL/L (ref 136–145)
WBC # BLD AUTO: 8.6 THOU/UL (ref 4–11)

## 2020-09-14 NOTE — EKG
[facility]
[address]
[place]   [address]                     ELECTROCARDIOGRAM REPORT      
_______________________________________________________________________________
 
Name:       FORTUNATOTERENCE              [id number]                       Account #:      [id number]  
Admission:  20    Attend Phys:                          
Discharge:  20    Date of Birth:  [birth year]  
                                                          Report #: [id number]
                                                                    [id number]
_______________________________________________________________________________
THIS REPORT FOR:  
 
cc:  FAM - No family physician/PCP 
     FAM - No family physician/PCP 
     Lalo Bailey MD                                            ~
THIS REPORT FOR:   //name//                          
 
                         [facility] ED
                                       
Test Date:    2020               Test Time:    17:35:52
Pat Name:     TERENCE BUCIO           Department:   
Patient ID:   [id number]            Room:          
Gender:                               Technician:   [facility]
:          1945               Requested By: Bryan Lopez
Order Number: [id number] MD:   Lalo Bailey
                                 Measurements
Intervals                              Axis          
Rate:         88                       P:            
MA:                                    QRS:          -18
QRSD:         110                      T:            67
QT:           364                                    
QTc:          441                                    
                           Interpretive Statements
Sinus rhythm
 
Probable left ventricular hypertrophy
Compared to ECG 2020 21:45:10
Ectopic atrial rhythm no longer present
Electronically Signed On 2020 8:09:01 CDT by Lalo Bailey
[email or web address]
 
 
 
 
 
 
 
 
 
 
 
 
 
 
  <ELECTRONICALLY SIGNED>
   By: Lalo Bailey MD        
  20     0809
D: 20 1735                           _____________________________________
T: 20 1735                           Lalo Bailey MD          /HADLEY

## 2020-09-20 ENCOUNTER — HOSPITAL ENCOUNTER (EMERGENCY)
Dept: HOSPITAL 35 - ER | Age: 75
Discharge: HOME | End: 2020-09-20
Payer: COMMERCIAL

## 2020-09-20 VITALS — WEIGHT: 210.01 LBS | BODY MASS INDEX: 37.21 KG/M2 | HEIGHT: 63 IN

## 2020-09-20 VITALS — DIASTOLIC BLOOD PRESSURE: 74 MMHG | SYSTOLIC BLOOD PRESSURE: 171 MMHG

## 2020-09-20 DIAGNOSIS — I50.9: ICD-10-CM

## 2020-09-20 DIAGNOSIS — M25.551: ICD-10-CM

## 2020-09-20 DIAGNOSIS — S00.83XA: ICD-10-CM

## 2020-09-20 DIAGNOSIS — G89.29: ICD-10-CM

## 2020-09-20 DIAGNOSIS — J44.9: ICD-10-CM

## 2020-09-20 DIAGNOSIS — Z79.899: ICD-10-CM
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
## 2020-09-20 DIAGNOSIS — M54.5: ICD-10-CM

## 2020-09-20 DIAGNOSIS — S80.11XA: ICD-10-CM
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
## 2020-09-20 DIAGNOSIS — I11.0: ICD-10-CM

## 2020-09-20 DIAGNOSIS — Z90.710: ICD-10-CM

## 2020-09-20 DIAGNOSIS — E78.5: ICD-10-CM
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
## 2020-09-20 DIAGNOSIS — R10.9: ICD-10-CM

## 2020-09-20 DIAGNOSIS — S32.018A: Primary | ICD-10-CM

## 2020-09-20 DIAGNOSIS — E11.9: ICD-10-CM

## 2020-09-20 LAB
ANION GAP SERPL CALC-SCNC: 7 MMOL/L (ref 7–16)
BUN SERPL-MCNC: 12 MG/DL (ref 7–18)
CALCIUM SERPL-MCNC: 9.5 MG/DL (ref 8.5–10.1)
CHLORIDE SERPL-SCNC: 104 MMOL/L (ref 98–107)
CO2 SERPL-SCNC: 32 MMOL/L (ref 21–32)
CREAT SERPL-MCNC: 0.8 MG/DL (ref 0.6–1)
ERYTHROCYTE [DISTWIDTH] IN BLOOD BY AUTOMATED COUNT: 16.3 % (ref 10.5–14.5)
GLUCOSE SERPL-MCNC: 117 MG/DL (ref 74–106)
HCT VFR BLD CALC: 37.3 % (ref 37–47)
HGB BLD-MCNC: 11.7 GM/DL (ref 12–15)
MCH RBC QN AUTO: 25.8 PG (ref 26–34)
MCHC RBC AUTO-ENTMCNC: 31.5 G/DL (ref 28–37)
MCV RBC: 81.9 FL (ref 80–100)
PLATELET # BLD: 184 THOU/UL (ref 150–400)
POTASSIUM SERPL-SCNC: 3.7 MMOL/L (ref 3.5–5.1)
RBC # BLD AUTO: 4.56 MIL/UL (ref 4.2–5)
SODIUM SERPL-SCNC: 143 MMOL/L (ref 136–145)
WBC # BLD AUTO: 7.1 THOU/UL (ref 4–11)

## 2020-09-30 ENCOUNTER — HOSPITAL ENCOUNTER (INPATIENT)
Dept: HOSPITAL 35 - ER | Age: 75
LOS: 2 days | Discharge: HOME HEALTH | DRG: 515 | End: 2020-10-02
Attending: HOSPITALIST | Admitting: HOSPITALIST
Payer: COMMERCIAL

## 2020-09-30 VITALS — SYSTOLIC BLOOD PRESSURE: 175 MMHG | DIASTOLIC BLOOD PRESSURE: 64 MMHG

## 2020-09-30 VITALS — SYSTOLIC BLOOD PRESSURE: 213 MMHG | DIASTOLIC BLOOD PRESSURE: 112 MMHG

## 2020-09-30 VITALS — DIASTOLIC BLOOD PRESSURE: 99 MMHG | SYSTOLIC BLOOD PRESSURE: 195 MMHG

## 2020-09-30 VITALS — WEIGHT: 210 LBS | BODY MASS INDEX: 37.21 KG/M2 | HEIGHT: 62.99 IN

## 2020-09-30 VITALS — SYSTOLIC BLOOD PRESSURE: 194 MMHG | DIASTOLIC BLOOD PRESSURE: 91 MMHG

## 2020-09-30 DIAGNOSIS — M54.9: ICD-10-CM

## 2020-09-30 DIAGNOSIS — M54.89: ICD-10-CM

## 2020-09-30 DIAGNOSIS — J44.9: ICD-10-CM
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
## 2020-09-30 DIAGNOSIS — Z20.828: ICD-10-CM

## 2020-09-30 DIAGNOSIS — Z86.73: ICD-10-CM

## 2020-09-30 DIAGNOSIS — E66.9: ICD-10-CM

## 2020-09-30 DIAGNOSIS — I50.9: ICD-10-CM

## 2020-09-30 DIAGNOSIS — I11.0: ICD-10-CM

## 2020-09-30 DIAGNOSIS — Z90.710: ICD-10-CM

## 2020-09-30 DIAGNOSIS — J96.01: ICD-10-CM

## 2020-09-30 DIAGNOSIS — F41.9: ICD-10-CM

## 2020-09-30 DIAGNOSIS — G89.29: ICD-10-CM

## 2020-09-30 DIAGNOSIS — M79.7: ICD-10-CM
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
## 2020-09-30 DIAGNOSIS — Z79.899: ICD-10-CM

## 2020-09-30 DIAGNOSIS — E44.1: ICD-10-CM

## 2020-09-30 DIAGNOSIS — E78.5: ICD-10-CM

## 2020-09-30 DIAGNOSIS — F32.9: ICD-10-CM

## 2020-09-30 DIAGNOSIS — I27.20: ICD-10-CM
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
## 2020-09-30 DIAGNOSIS — S32.019A: Primary | ICD-10-CM

## 2020-09-30 DIAGNOSIS — E11.51: ICD-10-CM
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
## 2020-09-30 LAB
ALBUMIN SERPL-MCNC: 3.3 G/DL (ref 3.4–5)
ALT SERPL-CCNC: 18 U/L (ref 30–65)
ANION GAP SERPL CALC-SCNC: 5 MMOL/L (ref 7–16)
ANISOCYTOSIS BLD QL SMEAR: (no result)
AST SERPL-CCNC: 17 U/L (ref 15–37)
BILIRUB SERPL-MCNC: 0.6 MG/DL (ref 0.2–1)
BILIRUB UR-MCNC: NEGATIVE MG/DL
BUN SERPL-MCNC: 12 MG/DL (ref 7–18)
CALCIUM SERPL-MCNC: 9.1 MG/DL (ref 8.5–10.1)
CHLORIDE SERPL-SCNC: 106 MMOL/L (ref 98–107)
CO2 SERPL-SCNC: 31 MMOL/L (ref 21–32)
COLOR UR: YELLOW
CREAT SERPL-MCNC: 0.8 MG/DL (ref 0.6–1)
EOSINOPHIL NFR BLD: 6 % (ref 0–3)
ERYTHROCYTE [DISTWIDTH] IN BLOOD BY AUTOMATED COUNT: 16.2 % (ref 10.5–14.5)
GLUCOSE SERPL-MCNC: 95 MG/DL (ref 74–106)
GRANULOCYTES NFR BLD MANUAL: 66 % (ref 36–66)
HCT VFR BLD CALC: 36 % (ref 37–47)
HGB BLD-MCNC: 11.4 GM/DL (ref 12–15)
KETONES UR STRIP-MCNC: NEGATIVE MG/DL
LYMPHOCYTES NFR BLD AUTO: 19 % (ref 24–44)
MCH RBC QN AUTO: 25.9 PG (ref 26–34)
MCHC RBC AUTO-ENTMCNC: 31.8 G/DL (ref 28–37)
MCV RBC: 81.4 FL (ref 80–100)
MONOCYTES NFR BLD: 6 % (ref 1–8)
NEUTROPHILS # BLD: 6.4 THOU/UL (ref 1.4–8.2)
NEUTS BAND NFR BLD: 3 % (ref 0–8)
PLATELET # BLD: 206 THOU/UL (ref 150–400)
POTASSIUM SERPL-SCNC: 4 MMOL/L (ref 3.5–5.1)
PROT SERPL-MCNC: 6.8 G/DL (ref 6.4–8.2)
RBC # BLD AUTO: 4.42 MIL/UL (ref 4.2–5)
RBC # UR STRIP: NEGATIVE /UL
SODIUM SERPL-SCNC: 142 MMOL/L (ref 136–145)
SP GR UR STRIP: 1.01 (ref 1–1.03)
URINE CLARITY: CLEAR
URINE GLUCOSE-RANDOM*: NEGATIVE
URINE LEUKOCYTES-REFLEX: (no result)
URINE NITRITE-REFLEX: NEGATIVE
URINE PROTEIN (DIPSTICK): NEGATIVE
UROBILINOGEN UR STRIP-ACNC: 0.2 E.U./DL (ref 0.2–1)
WBC # BLD AUTO: 9.3 THOU/UL (ref 4–11)

## 2020-09-30 PROCEDURE — 10100: CPT

## 2020-09-30 PROCEDURE — 10195: CPT

## 2020-10-01 VITALS — DIASTOLIC BLOOD PRESSURE: 77 MMHG | SYSTOLIC BLOOD PRESSURE: 171 MMHG

## 2020-10-01 VITALS — DIASTOLIC BLOOD PRESSURE: 57 MMHG | SYSTOLIC BLOOD PRESSURE: 151 MMHG

## 2020-10-01 VITALS — SYSTOLIC BLOOD PRESSURE: 154 MMHG | DIASTOLIC BLOOD PRESSURE: 84 MMHG

## 2020-10-01 VITALS — DIASTOLIC BLOOD PRESSURE: 55 MMHG | SYSTOLIC BLOOD PRESSURE: 147 MMHG

## 2020-10-01 VITALS — SYSTOLIC BLOOD PRESSURE: 126 MMHG | DIASTOLIC BLOOD PRESSURE: 58 MMHG

## 2020-10-01 VITALS — SYSTOLIC BLOOD PRESSURE: 193 MMHG | DIASTOLIC BLOOD PRESSURE: 94 MMHG

## 2020-10-01 VITALS — DIASTOLIC BLOOD PRESSURE: 82 MMHG | SYSTOLIC BLOOD PRESSURE: 177 MMHG

## 2020-10-01 VITALS — DIASTOLIC BLOOD PRESSURE: 58 MMHG | SYSTOLIC BLOOD PRESSURE: 123 MMHG

## 2020-10-01 VITALS — DIASTOLIC BLOOD PRESSURE: 85 MMHG | SYSTOLIC BLOOD PRESSURE: 151 MMHG

## 2020-10-01 VITALS — SYSTOLIC BLOOD PRESSURE: 178 MMHG | DIASTOLIC BLOOD PRESSURE: 79 MMHG

## 2020-10-01 VITALS — DIASTOLIC BLOOD PRESSURE: 81 MMHG | SYSTOLIC BLOOD PRESSURE: 157 MMHG

## 2020-10-01 VITALS — SYSTOLIC BLOOD PRESSURE: 174 MMHG | DIASTOLIC BLOOD PRESSURE: 76 MMHG

## 2020-10-01 VITALS — DIASTOLIC BLOOD PRESSURE: 58 MMHG | SYSTOLIC BLOOD PRESSURE: 126 MMHG

## 2020-10-01 VITALS — DIASTOLIC BLOOD PRESSURE: 47 MMHG | SYSTOLIC BLOOD PRESSURE: 122 MMHG

## 2020-10-01 VITALS — DIASTOLIC BLOOD PRESSURE: 60 MMHG | SYSTOLIC BLOOD PRESSURE: 127 MMHG

## 2020-10-01 LAB
ANION GAP SERPL CALC-SCNC: 2 MMOL/L (ref 7–16)
BUN SERPL-MCNC: 14 MG/DL (ref 7–18)
CALCIUM SERPL-MCNC: 9 MG/DL (ref 8.5–10.1)
CHLORIDE SERPL-SCNC: 107 MMOL/L (ref 98–107)
CO2 SERPL-SCNC: 35 MMOL/L (ref 21–32)
CREAT SERPL-MCNC: 1 MG/DL (ref 0.6–1)
ERYTHROCYTE [DISTWIDTH] IN BLOOD BY AUTOMATED COUNT: 16.3 % (ref 10.5–14.5)
GLUCOSE SERPL-MCNC: 125 MG/DL (ref 74–106)
HCT VFR BLD CALC: 34.8 % (ref 37–47)
HGB BLD-MCNC: 11 GM/DL (ref 12–15)
MAGNESIUM SERPL-MCNC: 2 MG/DL (ref 1.8–2.4)
MCH RBC QN AUTO: 25.8 PG (ref 26–34)
MCHC RBC AUTO-ENTMCNC: 31.5 G/DL (ref 28–37)
MCV RBC: 82 FL (ref 80–100)
PLATELET # BLD: 190 THOU/UL (ref 150–400)
POTASSIUM SERPL-SCNC: 4.4 MMOL/L (ref 3.5–5.1)
RBC # BLD AUTO: 4.25 MIL/UL (ref 4.2–5)
SODIUM SERPL-SCNC: 144 MMOL/L (ref 136–145)
WBC # BLD AUTO: 8.3 THOU/UL (ref 4–11)

## 2020-10-01 PROCEDURE — 0QS03ZZ REPOSITION LUMBAR VERTEBRA, PERCUTANEOUS APPROACH: ICD-10-PCS | Performed by: RADIOLOGY

## 2020-10-01 PROCEDURE — 0QU03JZ SUPPLEMENT LUMBAR VERTEBRA WITH SYNTHETIC SUBSTITUTE, PERCUTANEOUS APPROACH: ICD-10-PCS | Performed by: RADIOLOGY

## 2020-10-01 NOTE — NUR
I AGREE WITH NURSING ASSESSMENT, BUT NURSING NOTE NEEDS CLARIFICATION IN
REGARD TO ELEVATED B/P, PONTRILL/LPN STATES YANIRA COULD NOT REMEMBER IF SHE
NOTIFIED DR ARANGO OF ELEVATED B/P, THIS RN EDUCATED PONTRILLA/LPN TO ALWAYS
NOTIFY THE MD IF B/P ELEVATED AND PUT THAT SHE NOTIFIED THE MD IN HER NURSING
NOTE.

## 2020-10-01 NOTE — NUR
PT WAS ADMITTED TO THE UNIT FROM THE ER IN A STABLE CONDITION.ADMISSION HX
EDUCATION AND ASSESSMENT COMPLETED.PT'S BP WAS ELEVATED ON ADMIT PRN MED
ADMINISTERED,EFFECTIVE.BG MONITORED WAS 72.CORONA VIRUS TEST COMPLETED.PT NPO
AT THIS TIME FOR A POSSIBLE PROCEDURE IN THE AM.PT REF SCD AT THIS TIME.PT'S
PAIN MANAGED WITH IV AND PO MED.PT SLEEPING ON HER BED AT THIS TIME.FALL
PRECAUTIONS IN PLACE,CALL LIGHT WITHIN REACH.

## 2020-10-01 NOTE — NUR
Case opened to follow for dc planning. Pt is well known to  from frequent
admissions over the past couple of years. The pt most recently was here in May
and went to [facility] acute rehab prior to dcing back home with her son Brooks and dtr
in law Isabelle, with whom she lives. She had [facility] at [place] and they saw her
until mid July 2020. Referral sent to intake should she need to have hh again
at dc. The pt has also gone to [facility] and [facility] in the past for rehab.
Pt admitted with new L1 comp fx and possible khypoplasty tomorrow. PT/OT evals
pending. Pt will likely need HH referral and or rehab or snf pending her
progress. Will follow.

## 2020-10-01 NOTE — NUR
ASSUMED PT AT 0715. PT HAD SURGERY TODAY, AND IS RESTING POSTSURGERY. PT PAIN
IS CONTROLLED, AND PT IS ACHS. PT HAS A GOOD APPETITE AND IS CONTINENT TO
BOWEL AND BLADDER. BP CAN BECOME ELEVATED AND PT IS UP WITH ASSIST. FALL
PRECAUTIONS ARE IN PLACE, WILL CONTINUE TO MONITOR.

## 2020-10-02 VITALS — SYSTOLIC BLOOD PRESSURE: 146 MMHG | DIASTOLIC BLOOD PRESSURE: 78 MMHG

## 2020-10-02 VITALS — DIASTOLIC BLOOD PRESSURE: 94 MMHG | SYSTOLIC BLOOD PRESSURE: 175 MMHG

## 2020-10-02 VITALS — SYSTOLIC BLOOD PRESSURE: 175 MMHG | DIASTOLIC BLOOD PRESSURE: 94 MMHG

## 2020-10-02 NOTE — NUR
PT CARE ASSUNED WITH PT IN BED SLEEPING.PT IS A/O X4.PT IS A/O X4.PT IV ACCESS
ON RT AC.PT IS ACCUCHECK ACHS.PT NOT UP DURING SHIFT AND WILL BE EVALUATED BY
PT/OT.PT USES BEDPAN FOR ELIMINATION.PT ON O2 2L.PT HAS KETAN LE EDEMA.PT
APPEARED TO BE IN NO ACUTE PAIN OR DISTRESS.WILL CONTINUE TO MONITOR PER POC

## 2020-10-02 NOTE — NUR
DISCHARGE PAPERS REVIEWED WITH PATIENT. SIGNED AND COPY IN CHART. IV ACSESS
DCD. PT DRESSED AND ALL BELONGINGS PACKED AND SENT WITH PATIENT. PT W/O PAIN
OR RESP DISTRESS. HAS FORGETFULNESS AT TIMES. DIL WAS TO  EARLIER BUT
TRAFFIC WAS HEAVY AND THEN SHE HAD A FLAT TIRE. PT DISCHARGED AT THIS TIME.

## 2020-10-02 NOTE — NUR
Pt had khypoplasty yesterday and doing well with therapy this am. Dc to home
this afternoon. Dtr in law to pickup once RN calls her. HH orders faxed to
[facility] and confirmed with intake. Pt has needed dme in place at home.

## 2020-10-06 ENCOUNTER — HOSPITAL ENCOUNTER (EMERGENCY)
Dept: HOSPITAL 35 - ER | Age: 75
Discharge: HOME | End: 2020-10-06
Payer: COMMERCIAL

## 2020-10-06 VITALS — BODY MASS INDEX: 37.21 KG/M2 | WEIGHT: 210.01 LBS | HEIGHT: 63 IN

## 2020-10-06 VITALS — SYSTOLIC BLOOD PRESSURE: 186 MMHG | DIASTOLIC BLOOD PRESSURE: 77 MMHG

## 2020-10-06 DIAGNOSIS — Z90.710: ICD-10-CM

## 2020-10-06 DIAGNOSIS — I11.0: ICD-10-CM

## 2020-10-06 DIAGNOSIS — E11.9: ICD-10-CM

## 2020-10-06 DIAGNOSIS — G89.29: ICD-10-CM

## 2020-10-06 DIAGNOSIS — I50.9: ICD-10-CM

## 2020-10-06 DIAGNOSIS — E78.5: ICD-10-CM

## 2020-10-06 DIAGNOSIS — Z79.899: ICD-10-CM

## 2020-10-06 DIAGNOSIS — J44.9: ICD-10-CM

## 2020-10-06 DIAGNOSIS — R53.1: ICD-10-CM

## 2020-10-06 DIAGNOSIS — M25.562: Primary | ICD-10-CM

## 2020-10-06 DIAGNOSIS — M54.9: ICD-10-CM

## 2020-10-06 LAB
ANION GAP SERPL CALC-SCNC: 8 MMOL/L (ref 7–16)
ANISOCYTOSIS BLD QL SMEAR: (no result)
BENZODIAZ UR-MCNC: POSITIVE UG/L
BILIRUB UR-MCNC: NEGATIVE MG/DL
BUN SERPL-MCNC: 21 MG/DL (ref 7–18)
CALCIUM SERPL-MCNC: 8.8 MG/DL (ref 8.5–10.1)
CHLORIDE SERPL-SCNC: 105 MMOL/L (ref 98–107)
CO2 SERPL-SCNC: 28 MMOL/L (ref 21–32)
COLOR UR: YELLOW
CREAT SERPL-MCNC: 0.9 MG/DL (ref 0.6–1)
ERYTHROCYTE [DISTWIDTH] IN BLOOD BY AUTOMATED COUNT: 16.3 % (ref 10.5–14.5)
GLUCOSE SERPL-MCNC: 148 MG/DL (ref 74–106)
GRANULOCYTES NFR BLD MANUAL: 70 % (ref 36–66)
HCT VFR BLD CALC: 35.7 % (ref 37–47)
HGB BLD-MCNC: 11.4 GM/DL (ref 12–15)
KETONES UR STRIP-MCNC: NEGATIVE MG/DL
LYMPHOCYTES NFR BLD AUTO: 22 % (ref 24–44)
MACROCYTES: (no result)
MCH RBC QN AUTO: 26.1 PG (ref 26–34)
MCHC RBC AUTO-ENTMCNC: 31.9 G/DL (ref 28–37)
MCV RBC: 81.7 FL (ref 80–100)
MICROCYTES: (no result)
MONOCYTES NFR BLD: 2 % (ref 1–8)
NEUTROPHILS # BLD: 7.4 THOU/UL (ref 1.4–8.2)
NEUTS BAND NFR BLD: 6 % (ref 0–8)
PLATELET # BLD: 212 THOU/UL (ref 150–400)
POLYCHROMASIA BLD QL SMEAR: (no result)
POTASSIUM SERPL-SCNC: 3.8 MMOL/L (ref 3.5–5.1)
RBC # BLD AUTO: 4.37 MIL/UL (ref 4.2–5)
RBC # UR STRIP: NEGATIVE /UL
SODIUM SERPL-SCNC: 141 MMOL/L (ref 136–145)
SP GR UR STRIP: 1.01 (ref 1–1.03)
TROPONIN I SERPL-MCNC: <0.06 NG/ML (ref ?–0.06)
URINE CLARITY: CLEAR
URINE GLUCOSE-RANDOM*: NEGATIVE
URINE LEUKOCYTES-REFLEX: (no result)
URINE NITRITE-REFLEX: NEGATIVE
URINE PROTEIN (DIPSTICK): NEGATIVE
UROBILINOGEN UR STRIP-ACNC: 0.2 E.U./DL (ref 0.2–1)
WBC # BLD AUTO: 9.8 THOU/UL (ref 4–11)

## 2020-10-07 NOTE — EKG
[facility]
[place]   [address]                     ELECTROCARDIOGRAM REPORT      
_______________________________________________________________________________
 
Name:       TERENCE BUCIO              Room #:                     DEP [facility]#:      [id number]                       Account #:      [id number]  
Admission:  10/06/20    Attend Phys:                          
Discharge:  10/06/20    Date of Birth:  [birth year]  
                                                          Report #: [id number]
                                                                    [id number]
_______________________________________________________________________________
THIS REPORT FOR:  
 
cc:  Anatoliy Wheat MD, Bernard O. MD Lundgren,Baudilio JIANG MD [facility]
THIS REPORT FOR:   //name//                          
 
                         [facility] ED
                                       
Test Date:    2020-10-06               Test Time:    16:56:47
Pat Name:     TERENCE BUCIO           Department:   
Patient ID:   [id number]            Room:          
Gender:       F                        Technician:   NEGRITA
:          1945               Requested By: Zechariah Penaloza
Order Number: [id number] MD:   Baudilio Nicole
                                 Measurements
Intervals                              Axis          
Rate:         89                       P:            45
CT:           243                      QRS:          -20
QRSD:         108                      T:            66
QT:           382                                    
QTc:          465                                    
                           Interpretive Statements
Sinus rhythm
Prolonged CT interval
Borderline left axis deviation
Compared to ECG 2020 17:35:52
No significant change was found
Electronically Signed On 10-7-2020 7:41:54 CDT by Baudilio Nicole
[email or web address]
 
 
 
 
 
 
 
 
 
 
 
 
 
 
  <ELECTRONICALLY SIGNED>
   By: Baudilio Nicole MD, [facility]   
  10/07/20     0741
D: 10/06/20 1656                           _____________________________________
T: 10/06/20 1656                           Baudilio Nicole MD, [facility]     /EPI

## 2020-10-16 ENCOUNTER — HOSPITAL ENCOUNTER (EMERGENCY)
Dept: HOSPITAL 35 - ER | Age: 75
Discharge: HOME | End: 2020-10-16
Payer: COMMERCIAL

## 2020-10-16 VITALS — BODY MASS INDEX: 49.48 KG/M2 | WEIGHT: 252.01 LBS | HEIGHT: 60 IN

## 2020-10-16 VITALS — SYSTOLIC BLOOD PRESSURE: 181 MMHG | DIASTOLIC BLOOD PRESSURE: 100 MMHG

## 2020-10-16 DIAGNOSIS — M54.5: ICD-10-CM

## 2020-10-16 DIAGNOSIS — Z98.890: ICD-10-CM
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
## 2020-10-16 DIAGNOSIS — S00.83XA: Primary | ICD-10-CM

## 2020-10-16 DIAGNOSIS — E78.5: ICD-10-CM
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
## 2020-10-16 DIAGNOSIS — F32.9: ICD-10-CM
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
## 2020-10-16 DIAGNOSIS — E11.51: ICD-10-CM

## 2020-10-16 DIAGNOSIS — E11.9: ICD-10-CM

## 2020-10-16 DIAGNOSIS — R42: ICD-10-CM

## 2020-10-16 DIAGNOSIS — I11.0: ICD-10-CM

## 2020-10-16 DIAGNOSIS — M79.7: ICD-10-CM

## 2020-10-16 DIAGNOSIS — Z79.899: ICD-10-CM

## 2020-10-16 DIAGNOSIS — J44.9: ICD-10-CM

## 2020-10-16 DIAGNOSIS — R10.84: ICD-10-CM

## 2020-10-16 DIAGNOSIS — M25.551: ICD-10-CM

## 2020-10-16 DIAGNOSIS — F41.9: ICD-10-CM

## 2020-10-16 DIAGNOSIS — M25.552: ICD-10-CM

## 2020-10-16 DIAGNOSIS — Z90.711: ICD-10-CM

## 2020-10-16 DIAGNOSIS — I50.9: ICD-10-CM

## 2020-10-16 DIAGNOSIS — G89.29: ICD-10-CM

## 2020-10-16 LAB
ANION GAP SERPL CALC-SCNC: 6 MMOL/L (ref 7–16)
BASOPHILS NFR BLD AUTO: 0.9 % (ref 0–2)
BILIRUB UR-MCNC: NEGATIVE MG/DL
BUN SERPL-MCNC: 10 MG/DL (ref 7–18)
CALCIUM SERPL-MCNC: 9.5 MG/DL (ref 8.5–10.1)
CHLORIDE SERPL-SCNC: 106 MMOL/L (ref 98–107)
CO2 SERPL-SCNC: 35 MMOL/L (ref 21–32)
COLOR UR: YELLOW
CREAT SERPL-MCNC: 0.7 MG/DL (ref 0.6–1)
EOSINOPHIL NFR BLD: 3.1 % (ref 0–3)
ERYTHROCYTE [DISTWIDTH] IN BLOOD BY AUTOMATED COUNT: 16.7 % (ref 10.5–14.5)
GLUCOSE SERPL-MCNC: 88 MG/DL (ref 74–106)
GRANULOCYTES NFR BLD MANUAL: 72.8 % (ref 36–66)
HCT VFR BLD CALC: 34.3 % (ref 37–47)
HGB BLD-MCNC: 11.1 GM/DL (ref 12–15)
KETONES UR STRIP-MCNC: NEGATIVE MG/DL
LYMPHOCYTES NFR BLD AUTO: 17.6 % (ref 24–44)
MCH RBC QN AUTO: 26.1 PG (ref 26–34)
MCHC RBC AUTO-ENTMCNC: 32.2 G/DL (ref 28–37)
MCV RBC: 81.1 FL (ref 80–100)
MONOCYTES NFR BLD: 5.6 % (ref 1–8)
NEUTROPHILS # BLD: 5.1 THOU/UL (ref 1.4–8.2)
PLATELET # BLD: 182 THOU/UL (ref 150–400)
POTASSIUM SERPL-SCNC: 3.6 MMOL/L (ref 3.5–5.1)
RBC # BLD AUTO: 4.23 MIL/UL (ref 4.2–5)
RBC # UR STRIP: NEGATIVE /UL
SODIUM SERPL-SCNC: 147 MMOL/L (ref 136–145)
SP GR UR STRIP: 1.02 (ref 1–1.03)
URINE CLARITY: CLEAR
URINE GLUCOSE-RANDOM*: NEGATIVE
URINE LEUKOCYTES-REFLEX: (no result)
URINE NITRITE-REFLEX: NEGATIVE
URINE PROTEIN (DIPSTICK): NEGATIVE
UROBILINOGEN UR STRIP-ACNC: 0.2 E.U./DL (ref 0.2–1)
WBC # BLD AUTO: 7.1 THOU/UL (ref 4–11)

## 2020-10-25 ENCOUNTER — HOSPITAL ENCOUNTER (INPATIENT)
Dept: HOSPITAL 35 - ER | Age: 75
LOS: 5 days | Discharge: HOME HEALTH | DRG: 871 | End: 2020-10-30
Attending: INTERNAL MEDICINE | Admitting: INTERNAL MEDICINE
Payer: COMMERCIAL

## 2020-10-25 VITALS — BODY MASS INDEX: 38.8 KG/M2 | WEIGHT: 219 LBS | HEIGHT: 63 IN

## 2020-10-25 VITALS — SYSTOLIC BLOOD PRESSURE: 196 MMHG | DIASTOLIC BLOOD PRESSURE: 92 MMHG

## 2020-10-25 VITALS — SYSTOLIC BLOOD PRESSURE: 182 MMHG | DIASTOLIC BLOOD PRESSURE: 91 MMHG

## 2020-10-25 VITALS — DIASTOLIC BLOOD PRESSURE: 117 MMHG | SYSTOLIC BLOOD PRESSURE: 210 MMHG

## 2020-10-25 VITALS — SYSTOLIC BLOOD PRESSURE: 186 MMHG | DIASTOLIC BLOOD PRESSURE: 90 MMHG

## 2020-10-25 DIAGNOSIS — F32.9: ICD-10-CM

## 2020-10-25 DIAGNOSIS — J96.22: ICD-10-CM

## 2020-10-25 DIAGNOSIS — M54.9: ICD-10-CM

## 2020-10-25 DIAGNOSIS — D64.9: ICD-10-CM

## 2020-10-25 DIAGNOSIS — E87.0: ICD-10-CM

## 2020-10-25 DIAGNOSIS — J44.0: ICD-10-CM

## 2020-10-25 DIAGNOSIS — M79.7: ICD-10-CM

## 2020-10-25 DIAGNOSIS — F41.9: ICD-10-CM

## 2020-10-25 DIAGNOSIS — J44.1: ICD-10-CM

## 2020-10-25 DIAGNOSIS — E78.5: ICD-10-CM

## 2020-10-25 DIAGNOSIS — G89.29: ICD-10-CM

## 2020-10-25 DIAGNOSIS — J18.9: ICD-10-CM

## 2020-10-25 DIAGNOSIS — I27.20: ICD-10-CM

## 2020-10-25 DIAGNOSIS — A41.9: Primary | ICD-10-CM

## 2020-10-25 DIAGNOSIS — I11.0: ICD-10-CM

## 2020-10-25 DIAGNOSIS — E11.51: ICD-10-CM

## 2020-10-25 DIAGNOSIS — I50.9: ICD-10-CM

## 2020-10-25 DIAGNOSIS — E66.01: ICD-10-CM

## 2020-10-25 DIAGNOSIS — J96.21: ICD-10-CM

## 2020-10-25 DIAGNOSIS — Z90.710: ICD-10-CM

## 2020-10-25 DIAGNOSIS — E43: ICD-10-CM

## 2020-10-25 DIAGNOSIS — Z20.828: ICD-10-CM

## 2020-10-25 DIAGNOSIS — Z86.73: ICD-10-CM

## 2020-10-25 DIAGNOSIS — I16.0: ICD-10-CM

## 2020-10-25 LAB
ANION GAP SERPL CALC-SCNC: 8 MMOL/L (ref 7–16)
ANISOCYTOSIS BLD QL SMEAR: (no result)
BASOPHILS NFR BLD AUTO: 0.8 % (ref 0–2)
BE(VIVO): 5.4 MMOL/L
BUN SERPL-MCNC: 11 MG/DL (ref 7–18)
CALCIUM SERPL-MCNC: 9 MG/DL (ref 8.5–10.1)
CHLORIDE SERPL-SCNC: 105 MMOL/L (ref 98–107)
CO2 SERPL-SCNC: 35 MMOL/L (ref 21–32)
CREAT SERPL-MCNC: 1 MG/DL (ref 0.6–1)
EOSINOPHIL NFR BLD: 3.2 % (ref 0–3)
ERYTHROCYTE [DISTWIDTH] IN BLOOD BY AUTOMATED COUNT: 17.2 % (ref 10.5–14.5)
GLUCOSE SERPL-MCNC: 168 MG/DL (ref 74–106)
GRANULOCYTES NFR BLD MANUAL: 68.7 % (ref 36–66)
HCO3 BLD-SCNC: 33.2 MMOL/L (ref 22–26)
HCT VFR BLD CALC: 35.6 % (ref 37–47)
HGB BLD-MCNC: 11.1 GM/DL (ref 12–15)
LYMPHOCYTES NFR BLD AUTO: 22.6 % (ref 24–44)
MCH RBC QN AUTO: 25.7 PG (ref 26–34)
MCHC RBC AUTO-ENTMCNC: 31.1 G/DL (ref 28–37)
MCV RBC: 82.5 FL (ref 80–100)
MONOCYTES NFR BLD: 4.7 % (ref 1–8)
NEUTROPHILS # BLD: 6.6 THOU/UL (ref 1.4–8.2)
PCO2 BLD: 65.8 MMHG (ref 35–45)
PLATELET # BLD: 190 THOU/UL (ref 150–400)
PO2 BLD: 71.6 MMHG (ref 80–100)
POTASSIUM SERPL-SCNC: 3.6 MMOL/L (ref 3.5–5.1)
RBC # BLD AUTO: 4.32 MIL/UL (ref 4.2–5)
SODIUM SERPL-SCNC: 148 MMOL/L (ref 136–145)
TROPONIN I SERPL-MCNC: <0.06 NG/ML (ref ?–0.06)
WBC # BLD AUTO: 9.6 THOU/UL (ref 4–11)

## 2020-10-25 PROCEDURE — 10045: CPT

## 2020-10-25 PROCEDURE — 10879: CPT

## 2020-10-25 PROCEDURE — 05HB33Z INSERTION OF INFUSION DEVICE INTO RIGHT BASILIC VEIN, PERCUTANEOUS APPROACH: ICD-10-PCS | Performed by: INTERNAL MEDICINE

## 2020-10-25 PROCEDURE — 27000 TENOTOMY ADDUCTOR HIP PERQ: CPT

## 2020-10-25 NOTE — NUR
CONSULTED TO PLACE A PIV FOR THIS PATIENT IN THE ER. WELL KNOWN TO OUR TEAM AS
A DIFFICULT IV STICK. THIS IS HER 3RD IV PLACEMENT IN THE SHORT TIME SHE HAS
BEEN IN THE ER. DISSCUSSED MIDLINE PLACEMENT WITH HER AFTER CONFIRMING SHE
WILL BE ADMITTED. SHE AGREED TO PLACEMENT. ATTEMPTED THE RIGHT CEPHALIC X3 AND
UNABLE TO THREAD WIRE, A #4F POWER MIDLINE WAS PLACED IN THE RIGHT BASILIC.
LINE WAS TRIMMED TO 15CM AND ADVANCED WITHOUT DIFFICULTY. SECURED AND RELEASED
FOR USE

## 2020-10-25 NOTE — HC
[facility]
[address]
[place]   [address]                     CONSULTATION                  
_______________________________________________________________________________
 
Name:       TERENCE BUCIO              Room #:         456-P       ADM IN  
M.R.#:      [id number]                       Account #:      [id number]  
Admission:  10/25/20    Attend Phys:    Quique Jason MD      
Discharge:              Date of Birth:  [birth year]  
                                                          Report #: [id number]
                                                                    [id number]   
_______________________________________________________________________________
THIS REPORT FOR:  
 
cc:  Anatoliy Wheat MD, Bernard O. MD Smithson, David G. MD                                         ~
 
 
DATE OF SERVICE:  10/28/2020
 
 
HISTORY OF PRESENT ILLNESS:  The patient is a 75-year-old white female
previously known to me, who was just discharged on 10/02/2020 from [facility] post-kyphoplasty for L1 vertebral compression fracture.  She has
been noted to have 9 visits to the Emergency Department this year.  She
readmitted this time on 10/25/2020 with increased shortness of breath and was
noted to have an O2 sat of 80% per EMS.  She was diagnosed with acute hypoxic
respiratory failure with acute exacerbation of chronic obstructive pulmonary
disease and hypertensive urgency.  Pulmonary Medicine is involved.  She is on IV
Solu-Medrol and the pulmonary program.  COVID was negative.  She is currently on
4 liters.  She has had a functional decline from her premorbid status and we are
seeing her in rehabilitation medicine consultation.
 
PAST MEDICAL HISTORY:  Includes diabetes mellitus type 2, peripheral arterial
disease, hyperlipidemia, fibromyalgia, morbid obesity.
 
SOCIAL HISTORY:  She had a prior [address] [place] [address] inpatient rehabilitation stay
back in 05/2020 for a diagnosis of toxic metabolic encephalopathy.  She had
Psychiatry involved during that stay, had an elevated sed rate with a question
of vasculitis.  She was able to be discharged back to the home setting
ambulating 100 feet with front-wheeled walker, standby assistance.  She
currently lives with her son and daughter-in-law whom she notes that they live
with her.  She was on 2 liters just at night.  She did utilize a walker.  Of
note is she has had a [facility] stay as well.
 
REVIEW OF SYSTEMS:  Shortness of breath with limited activity.  No chest pain or
abdominal discomfort.
 
PHYSICAL EXAMINATION:
GENERAL:  This is a 75-year-old obese female in no obvious distress.  She is
currently on 4 liters nasal cannula.
VITAL SIGNS:  Temperature 36.6, pulse 78, respirations 20, blood pressure
173/87.  She is alert, will follow basic 1 step commands.
HEENT:  Facies are symmetric.
EXTREMITIES:  Functional range of motion of both upper extremities.  Strength is
grade 4-/5 to 3+/5.  Lower extremities, no focal calf swelling, functional range
of motion with strength grade 3+ to 4-/5.  Tone appeared to be intact.  She is
 
 
 
[place] [address]                     CONSULTATION                  
_______________________________________________________________________________
 
Name:       TERENCE BUCIO              Room #:         456-P       [facility] [place]  
M.R.#:      [id number]                       Account #:      [id number]  
Admission:  10/25/20    Attend Phys:    Quique Jason MD      
Discharge:              Date of Birth:  [birth year]  
                                                          Report #: [id number]
                                                                    [id number]   
_______________________________________________________________________________
 
 
standby assistance with sit to stand and ambulated 55 feet min assist with a
front-wheeled walker.  She needs assistance with gait.  She is on 4 liters.  Bed
mobility is minimum.  Bathing with moderate assistance.
 
ASSESSMENT:  This is a 75-year-old white female with the following problem list:
1.  Pulmonary debilitation.
2.  Medical complexity with generalized debilitation.
3.  Acute hypoxic respiratory failure.
4.  Acute exacerbation of chronic obstructive pulmonary disease.
5.  Hypertensive urgency.
6.  Recent L1 vertebral compression fracture, status post kyphoplasty.
7.  Diabetes mellitus type 2.
8.  Peripheral arterial disease.
9.  Hyperlipidemia.
10.  Morbid obesity.
11.  Fibromyalgia.
 
PLAN:  We will need to check on acute hospital days.  The patient is desiring to
return directly home, but I discussed with her the multiple Emergency Room
visits and her decreased functional status and her increased medical needs.  She
may warrant a short acute in-hospital inpatient rehabilitation stay.  Again, we
are checking on acute hospital day status and we will be glad to follow along
with you regarding her rehab therapy needs.
 
 
 
 
 
 
 
 
 
 
 
 
 
 
 
 
 
 
 
                         
   By:                               
                   
D: 10/28/20 1502                           _____________________________________
T: 10/29/20 0135                           Avni Euceda MD           /LUCY

## 2020-10-25 NOTE — EKG
[facility]
[place]   [address]                     ELECTROCARDIOGRAM REPORT      
_______________________________________________________________________________
 
Name:       FORTUNATOTERENCE              Room #:                     [facility]#:      [id number]                       Account #:      [id number]  
Admission:  10/25/20    Attend Phys:                          
Discharge:              Date of Birth:  [birth year]  
                                                          Report #: [id number]
                                                                    [id number]
_______________________________________________________________________________
THIS REPORT FOR:  
 
cc:  Anatoliy Wheat MD, Bernard O. MD Couchonnal, Luis F. MD                                            ~
THIS REPORT FOR:   //name//                          
 
                         [facility] ED
                                       
Test Date:    2020-10-25               Test Time:    07:41:27
Pat Name:     TERENCE BUCIO           Department:   
Patient ID:   [id number]            Room:          
Gender:                               Technician:   
:          1945               Requested By: Bryan Lopez
Order Number: [id number] MD:   Lalo Bailey
                                 Measurements
Intervals                              Axis          
Rate:         86                       P:            55
HI:           54                       QRS:          -27
QRSD:         113                      T:            67
QT:           378                                    
QTc:          452                                    
                           Interpretive Statements
Sinus rhythm
Atrial premature complex
Short HI interval
LVH with secondary repolarization abnormality
Compared to ECG 10/06/2020 16:56:47
Electronically Signed On 10- 11:19:36 CDT by Lalo Bailey
[email or web address]
 
 
 
 
 
 
 
 
 
 
 
 
 
 
  <ELECTRONICALLY SIGNED>
   By: Lalo Bailey MD        
  10/25/20     1119
D: 10/25/20 0741                           _____________________________________
T: 10/25/20 0741                           Lalo Bailey MD          /HADLEY

## 2020-10-26 VITALS — SYSTOLIC BLOOD PRESSURE: 154 MMHG | DIASTOLIC BLOOD PRESSURE: 93 MMHG

## 2020-10-26 VITALS — SYSTOLIC BLOOD PRESSURE: 138 MMHG | DIASTOLIC BLOOD PRESSURE: 75 MMHG

## 2020-10-26 VITALS — DIASTOLIC BLOOD PRESSURE: 81 MMHG | SYSTOLIC BLOOD PRESSURE: 146 MMHG

## 2020-10-26 VITALS — SYSTOLIC BLOOD PRESSURE: 172 MMHG | DIASTOLIC BLOOD PRESSURE: 82 MMHG

## 2020-10-26 VITALS — DIASTOLIC BLOOD PRESSURE: 87 MMHG | SYSTOLIC BLOOD PRESSURE: 166 MMHG

## 2020-10-26 LAB
ALBUMIN SERPL-MCNC: 2.7 G/DL (ref 3.4–5)
ALT SERPL-CCNC: 17 U/L (ref 30–65)
ANION GAP SERPL CALC-SCNC: 8 MMOL/L (ref 7–16)
APTT BLD: 38.7 SECONDS (ref 24.5–32.8)
AST SERPL-CCNC: 14 U/L (ref 15–37)
BASOPHILS NFR BLD AUTO: 0.1 % (ref 0–2)
BE(VIVO): 6.2 MMOL/L
BILIRUB SERPL-MCNC: 0.4 MG/DL (ref 0.2–1)
BUN SERPL-MCNC: 13 MG/DL (ref 7–18)
CALCIUM SERPL-MCNC: 9.1 MG/DL (ref 8.5–10.1)
CHLORIDE SERPL-SCNC: 103 MMOL/L (ref 98–107)
CO2 SERPL-SCNC: 33 MMOL/L (ref 21–32)
CREAT SERPL-MCNC: 0.7 MG/DL (ref 0.6–1)
EOSINOPHIL NFR BLD: 0 % (ref 0–3)
ERYTHROCYTE [DISTWIDTH] IN BLOOD BY AUTOMATED COUNT: 16.2 % (ref 10.5–14.5)
GLUCOSE SERPL-MCNC: 192 MG/DL (ref 74–106)
GRANULOCYTES NFR BLD MANUAL: 94.4 % (ref 36–66)
HCO3 BLD-SCNC: 31.8 MMOL/L (ref 22–26)
HCT VFR BLD CALC: 31.5 % (ref 37–47)
HGB BLD-MCNC: 9.8 GM/DL (ref 12–15)
INR PPP: 1.1
LYMPHOCYTES NFR BLD AUTO: 4.2 % (ref 24–44)
MAGNESIUM SERPL-MCNC: 1.7 MG/DL (ref 1.8–2.4)
MCH RBC QN AUTO: 25.4 PG (ref 26–34)
MCHC RBC AUTO-ENTMCNC: 31.1 G/DL (ref 28–37)
MCV RBC: 81.6 FL (ref 80–100)
MONOCYTES NFR BLD: 1.3 % (ref 1–8)
NEUTROPHILS # BLD: 11.5 THOU/UL (ref 1.4–8.2)
PCO2 BLD: 50.9 MMHG (ref 35–45)
PLATELET # BLD: 164 THOU/UL (ref 150–400)
PO2 BLD: 133.5 MMHG (ref 80–100)
POTASSIUM SERPL-SCNC: 3.7 MMOL/L (ref 3.5–5.1)
PROT SERPL-MCNC: 6.1 G/DL (ref 6.4–8.2)
PROTHROMBIN TIME: 10.9 SECONDS (ref 9.3–11.4)
RBC # BLD AUTO: 3.85 MIL/UL (ref 4.2–5)
SODIUM SERPL-SCNC: 144 MMOL/L (ref 136–145)
WBC # BLD AUTO: 12.1 THOU/UL (ref 4–11)

## 2020-10-26 NOTE — NUR
INITIAL ASSESSMENT:
Received consult for discharge planning. LEA reviewed chart and spoke with
nursing and attending physician. Pt was admitted from home due to COPD
exacerbation. Pt placed in Enhanced Isolation to r/o COVID-19. Pt's test is
negative. Pt is on 5L of O2 and on IV abx and IV steroids. Pt known to SW from
previous hospitalizations. SW spoke with pt via phone. Introduced role of SW.
Pt is alert/orientated. Pt lives at home with her son and dtr in law. Pt has a
walker and home O2. Pt has a concentrator and uses O2 only at nighttime. Pt
states she has had the home O2 equipment for about 10 years and that [employer]
stopped billing her. Pt interested in getting a portable concentrator. SW
discussed with pt that she will have to qualify and see what her insurance
covers. Pt is agreeable with using [employer] again. Pt has used [facility] in the past, and has been to [facility],  and [facility]. Pt's PCP is Dr. Anatoliy Wheat. Therapy ordered to evaluate pt
for discharge needs. Pt states she does not want to go anywhere for rehab and
wants to go home. SW is following to assist as needed with discharge planning.

## 2020-10-26 NOTE — NUR
PT CARE ASSUMED AT 0700, PT ALERT AND ORIENTED X3, FORGETFUL AT TIMES. PT
DENIES ANY CHEST PAIN, NAUSEA AND VOMITTING. COMPLAINS OF BACK PAIN, PAIN MED
GIVEN PER ORDER. PT IS ON 3L OF OXYGEN AT MOMENT, NORMAL WEARS 5L AT HOME. SOB
WITH EXERTION, WHEEZING AT TIMES. PT IS UP WITH ONE ASSIST, FALL PRECAUTIONS
IN PLACE. WILL CONTINUE TO MONITOR.

## 2020-10-26 NOTE — NUR
RECIEVED PT FROM ED, UPON ARRIVAL TO UNIT PT ALERT ORIENTED X4 , PLACED
CAREDIAC MONITOR ON SHOWS NSR . LUNG SOUNDS WITH AUDIBLE WHEEZES, VSS. PT C/O
BACK PAIN , CALLED AND PAIN MEDICATON ORDERED. DISCUSSED PLAN OF CARE PT
VERBALIZED UNDERSTANDING AND AGREEABLE.

## 2020-10-27 VITALS — SYSTOLIC BLOOD PRESSURE: 153 MMHG | DIASTOLIC BLOOD PRESSURE: 66 MMHG

## 2020-10-27 VITALS — DIASTOLIC BLOOD PRESSURE: 74 MMHG | SYSTOLIC BLOOD PRESSURE: 146 MMHG

## 2020-10-27 VITALS — DIASTOLIC BLOOD PRESSURE: 74 MMHG | SYSTOLIC BLOOD PRESSURE: 145 MMHG

## 2020-10-27 VITALS — DIASTOLIC BLOOD PRESSURE: 59 MMHG | SYSTOLIC BLOOD PRESSURE: 179 MMHG

## 2020-10-27 VITALS — SYSTOLIC BLOOD PRESSURE: 157 MMHG | DIASTOLIC BLOOD PRESSURE: 100 MMHG

## 2020-10-27 NOTE — NUR
PT PROGRESSING WELL TOWARDS D/C GOALS. VSS AFEBRILE THIS AM. NSR ON MONITOR
UNLABORED ON 3LNC. INC OF URINE IN LG AMTS DESPITE PURE WICK SYSTEM. BED DOWN
CALL LIGHT IN REACH. BED ALARM ON.

## 2020-10-27 NOTE — NUR
PT CARE ASSUMED AT 0700, PT ALERT AND OREINTED X4, FORGETFUL AT TIMES. PT
DENIES ANY CHEST PAIN, NAUSEA AND VOMITTING. PT IS ON 5L OF OXYGEN, SOB WITH
EXERTION. UP WITH ONE ASSIST, FALL RECAUTIONS IN PLACE. WILL CONTINUE TO
MONITOR.

## 2020-10-28 VITALS — DIASTOLIC BLOOD PRESSURE: 87 MMHG | SYSTOLIC BLOOD PRESSURE: 173 MMHG

## 2020-10-28 VITALS — DIASTOLIC BLOOD PRESSURE: 94 MMHG | SYSTOLIC BLOOD PRESSURE: 194 MMHG

## 2020-10-28 VITALS — SYSTOLIC BLOOD PRESSURE: 165 MMHG | DIASTOLIC BLOOD PRESSURE: 62 MMHG

## 2020-10-28 VITALS — SYSTOLIC BLOOD PRESSURE: 171 MMHG | DIASTOLIC BLOOD PRESSURE: 82 MMHG

## 2020-10-28 VITALS — SYSTOLIC BLOOD PRESSURE: 191 MMHG | DIASTOLIC BLOOD PRESSURE: 87 MMHG

## 2020-10-28 LAB
ANION GAP SERPL CALC-SCNC: 5 MMOL/L (ref 7–16)
BILIRUB UR-MCNC: NEGATIVE MG/DL
BUN SERPL-MCNC: 26 MG/DL (ref 7–18)
CALCIUM SERPL-MCNC: 9.8 MG/DL (ref 8.5–10.1)
CHLORIDE SERPL-SCNC: 104 MMOL/L (ref 98–107)
CO2 SERPL-SCNC: 36 MMOL/L (ref 21–32)
COLOR UR: YELLOW
CREAT SERPL-MCNC: 1 MG/DL (ref 0.6–1)
ERYTHROCYTE [DISTWIDTH] IN BLOOD BY AUTOMATED COUNT: 16.7 % (ref 10.5–14.5)
GLUCOSE SERPL-MCNC: 164 MG/DL (ref 74–106)
HCT VFR BLD CALC: 35.6 % (ref 37–47)
HGB BLD-MCNC: 11.1 GM/DL (ref 12–15)
KETONES UR STRIP-MCNC: NEGATIVE MG/DL
MCH RBC QN AUTO: 25.7 PG (ref 26–34)
MCHC RBC AUTO-ENTMCNC: 31.2 G/DL (ref 28–37)
MCV RBC: 82.5 FL (ref 80–100)
PLATELET # BLD: 181 THOU/UL (ref 150–400)
POTASSIUM SERPL-SCNC: 3.8 MMOL/L (ref 3.5–5.1)
RBC # BLD AUTO: 4.32 MIL/UL (ref 4.2–5)
RBC # UR STRIP: NEGATIVE /UL
SODIUM SERPL-SCNC: 145 MMOL/L (ref 136–145)
SP GR UR STRIP: 1.02 (ref 1–1.03)
URINE CLARITY: (no result)
URINE GLUCOSE-RANDOM*: NEGATIVE
URINE LEUKOCYTES-REFLEX: NEGATIVE
URINE NITRITE-REFLEX: NEGATIVE
URINE PROTEIN (DIPSTICK): NEGATIVE
UROBILINOGEN UR STRIP-ACNC: 0.2 E.U./DL (ref 0.2–1)
WBC # BLD AUTO: 8.2 THOU/UL (ref 4–11)

## 2020-10-28 NOTE — NUR
SW reviewed chart and spoke with nursing and attending physician. Enhanced
Isolation precautions have been discontinued. Pt is on 4-5L of O2 and is on IV
abx and IV steroids. [facility] consulted and can accept pt if she is agreeable with
going to [facility]. Pt wanting to discuss with her son. [facility] liaison to also reach out
to family. SW placed call to pt's room. No answer. LEA is following to assist
as needed with discharge planning.

## 2020-10-28 NOTE — NUR
PT CARE ASSUMED AT 0700, PT ALERT AND ORIENTED X4, FORGETFUL AT TIMES. PT
DENIES CHEST PAIN, NAUSEA AND VOMITTING. PT IS ON 4L OF OXYGEN, SOB WITH
EXERTIONS. PT LUNGS SOUNDS COARSE AND WHEEZY. DR. KAPLAN AWARE ABOUT PT
CHEST XRAY. DENIES ANY NEEDS SUBHASH. FALL PRECAUTIONS IN PLACE, WILL CONTINUE TO
MONITOR.

## 2020-10-28 NOTE — NUR
Pt. rested quietly during the night when checked on during frequent rounds.
She was c/o dry nasal passages with a non-productive cough.  Pt. requesting
meds for these symptoms.  Karli AVILA called and new orders for saline gel
and mucinex (see cpoe).  Bed alarm is on.

## 2020-10-28 NOTE — NUR
1640 PT /87, RECHECKED IT AGAIN IT /82. HR 80'S. DR. KAPLAN
PAGED. WAITING FOR ORDERS.
1658 DR. KAPLAN CALLED, NEW ORDERS IN.

## 2020-10-28 NOTE — NUR
PATIENT SEEN BY DR. CABRERA THIS DATE FOR REHAB CONSULT. PATIENT IS A
CANDIDATE FOR ACUTE REHAB AND CAN BE ACCEPTED TO [facility] WHEN MEDICALLY READY.
REHAB LIAISON SPOKE WITH PATIENT'S SON WHO IS IN AGREEMENT THAT PATIENT NEEDS
CONTINUED REHAB PRIOR TO COMIING BACK HOME AND SON WOULD LIKE PATIENT TO COME
TO [facility] AT DISCHARGE FROM ACUTE HOSPITAL.  INFORMED.

## 2020-10-29 VITALS — DIASTOLIC BLOOD PRESSURE: 68 MMHG | SYSTOLIC BLOOD PRESSURE: 140 MMHG

## 2020-10-29 VITALS — DIASTOLIC BLOOD PRESSURE: 91 MMHG | SYSTOLIC BLOOD PRESSURE: 177 MMHG

## 2020-10-29 VITALS — SYSTOLIC BLOOD PRESSURE: 139 MMHG | DIASTOLIC BLOOD PRESSURE: 58 MMHG

## 2020-10-29 VITALS — DIASTOLIC BLOOD PRESSURE: 73 MMHG | SYSTOLIC BLOOD PRESSURE: 144 MMHG

## 2020-10-29 LAB
ANION GAP SERPL CALC-SCNC: 5 MMOL/L (ref 7–16)
BUN SERPL-MCNC: 23 MG/DL (ref 7–18)
CALCIUM SERPL-MCNC: 8.8 MG/DL (ref 8.5–10.1)
CHLORIDE SERPL-SCNC: 103 MMOL/L (ref 98–107)
CO2 SERPL-SCNC: 35 MMOL/L (ref 21–32)
CREAT SERPL-MCNC: 0.7 MG/DL (ref 0.6–1)
ERYTHROCYTE [DISTWIDTH] IN BLOOD BY AUTOMATED COUNT: 16.2 % (ref 10.5–14.5)
GLUCOSE SERPL-MCNC: 210 MG/DL (ref 74–106)
HCT VFR BLD CALC: 33.3 % (ref 37–47)
HGB BLD-MCNC: 10.5 GM/DL (ref 12–15)
MAGNESIUM SERPL-MCNC: 2 MG/DL (ref 1.8–2.4)
MCH RBC QN AUTO: 25.9 PG (ref 26–34)
MCHC RBC AUTO-ENTMCNC: 31.5 G/DL (ref 28–37)
MCV RBC: 82.2 FL (ref 80–100)
PLATELET # BLD: 124 THOU/UL (ref 150–400)
POTASSIUM SERPL-SCNC: 4 MMOL/L (ref 3.5–5.1)
RBC # BLD AUTO: 4.05 MIL/UL (ref 4.2–5)
SODIUM SERPL-SCNC: 143 MMOL/L (ref 136–145)
WBC # BLD AUTO: 6.9 THOU/UL (ref 4–11)

## 2020-10-29 NOTE — NUR
PATIENT TRANSFERRED TO  AROUND 2200. PATIENT A/0X 4. PATIENT 02NC ON AT 3L.
SHE USES 5L AT HOME.  PATIENT HAS MULTIPLE BRUISES OVER HER ABDOMEN AND THIGHS
FROM INJECTIONS.  PATIENT DENIES PAIN. LUNGS ARE COARSE BILATERALLY AND
PATIENT DOES HAVE SCHEDULED RT'S TO HELP BREATHING. PATIENT'S COVID PCR WAS
NEGATIVE 10/28/20. PATIENT'S VITALS STABLE /65 P74 R18 T98.7. EXTERNAL
CATHETER APPLIED. PATIENT WITH REDNESS THAT APPEARS AS YEAST UNDER HER BREASTS
BILATERALLY. PATIENT HAS ACUCHECKS ACHS. SHE HAS RIGHT UPPER ARM MIDLINE
SALINE LOCK THAT IS IN PLACE WITHOUT REDNESS OR SWELLING.  PATIENT LIVES AT
HOME AND HER SON LIVES WITH HER. PATIENT PLEASANT AND COOPERATIVE. PATIENT DX
IS COPD EXACERBATION AND ACUTE RESPIRATORY FAILURE. PATIENT DOES HAVE
SIDERAILS UP X 4 AND BED ALARM IS ON. BED IN LOW POSITION. PATIENT CAN GET UP
WITH ASSIST TO BSC X1-2. PATIENT HAS 2+ EDEMA IN LEFT LE AND 1+ IN RIGHT LE.
PATIENT SLEEPING AT THIS TIME. WILL CONTINUE TO MONITOR.

## 2020-10-29 NOTE — NUR
[facility] HAD ASSESED PT FOR POSSIBLE ADMISSION. THEY ARE ABLE TO ACCEPT. PT WAS TO
SPEAK WITH HER FAMILY ABOUT POSSIBLE ADMISSION. CM FOLLOWED UP WITH PT THIS AM
AND SHE INDICATED THAT SHE WANTED TO RETURN HOME AND HOPED TO DISCHARGE HOME
TODAY. CM CALLED HER SON MEDICAL DPOA JEFFREY AND LEFT A VM. CARE TEAM INDICATED
THAT THEY WANT TO DO A REPEAT CHEST XRAY. CM TO FOLLOW AS INDICATED WITH DC
PLANNING. POSSIBLE ADMISSION TO 5 THIS DAY.

## 2020-10-30 VITALS — DIASTOLIC BLOOD PRESSURE: 77 MMHG | SYSTOLIC BLOOD PRESSURE: 157 MMHG

## 2020-10-30 VITALS — SYSTOLIC BLOOD PRESSURE: 157 MMHG | DIASTOLIC BLOOD PRESSURE: 77 MMHG

## 2020-10-30 VITALS — SYSTOLIC BLOOD PRESSURE: 138 MMHG | DIASTOLIC BLOOD PRESSURE: 78 MMHG

## 2020-10-30 LAB
ANION GAP SERPL CALC-SCNC: 4 MMOL/L (ref 7–16)
BUN SERPL-MCNC: 26 MG/DL (ref 7–18)
CALCIUM SERPL-MCNC: 9.4 MG/DL (ref 8.5–10.1)
CHLORIDE SERPL-SCNC: 103 MMOL/L (ref 98–107)
CO2 SERPL-SCNC: 39 MMOL/L (ref 21–32)
CREAT SERPL-MCNC: 0.8 MG/DL (ref 0.6–1)
ERYTHROCYTE [DISTWIDTH] IN BLOOD BY AUTOMATED COUNT: 15.8 % (ref 10.5–14.5)
GLUCOSE SERPL-MCNC: 201 MG/DL (ref 74–106)
HCT VFR BLD CALC: 31.5 % (ref 37–47)
HGB BLD-MCNC: 9.9 GM/DL (ref 12–15)
MAGNESIUM SERPL-MCNC: 2.2 MG/DL (ref 1.8–2.4)
MCH RBC QN AUTO: 25.7 PG (ref 26–34)
MCHC RBC AUTO-ENTMCNC: 31.5 G/DL (ref 28–37)
MCV RBC: 81.5 FL (ref 80–100)
PLATELET # BLD: 153 THOU/UL (ref 150–400)
POTASSIUM SERPL-SCNC: 3.5 MMOL/L (ref 3.5–5.1)
RBC # BLD AUTO: 3.86 MIL/UL (ref 4.2–5)
SODIUM SERPL-SCNC: 146 MMOL/L (ref 136–145)
WBC # BLD AUTO: 5.3 THOU/UL (ref 4–11)

## 2020-10-30 NOTE — NUR
Assumed pt care at 7am.Pt in bed sound asleep at the beginning of shift with
o2 on.Assessment completed.vss.Pt took all meds with breakfast and well
tolerated.Dr Wilson here,dc order noted. arranged for home
health.Dc summary compile and reviewed with pt. Rx faxed to pt pharmacy.
At 1235,pt dc home with family in  accompanied by pca.

## 2020-10-30 NOTE — NUR
CM CALLED AND SPOKE WITH [facility] TO FOLLOW UP ON REFERRAL THAT WAS SENT
YESTERDAY AND THEY AREN'T ABLE TO ACCEPT PT BACK ON THEIR SERVICES. REFERRALS
TO BE SENT ELSEWHERE FOR HH SERVIVRS. ANTIPATE THAT PT WILL BE ABLE TO DC HOME
THIS DAY. CM TO FOLLOW AS INDICATED WITH DC PLANNING.

## 2020-10-30 NOTE — NUR
PT DISCHARGING TODAY TO [facility] WITH HH FAXED REFERRAL TO [facility] CASTANO SPOKE WITH
MIHAELA IN INTAKE THEY CAN ACCEPT FAXED DC ORDERS/SUMMARY RECEIVED
CONFIRMATION AND [facility]  WILL CALL TO ARRANGE VISITS.

## 2020-10-30 NOTE — NUR
Patient progressing towards outcome goals. Vital signs and rhythm stable.
Patient refuses rehab, states she feels like she is back to her baseline and
prefers to go home.

## 2020-11-08 ENCOUNTER — HOSPITAL ENCOUNTER (INPATIENT)
Dept: HOSPITAL 35 - ER | Age: 75
LOS: 29 days | DRG: 207 | End: 2020-12-07
Attending: HOSPITALIST | Admitting: HOSPITALIST
Payer: COMMERCIAL

## 2020-11-08 VITALS — BODY MASS INDEX: 46.07 KG/M2 | HEIGHT: 62.99 IN | WEIGHT: 260 LBS

## 2020-11-08 VITALS — SYSTOLIC BLOOD PRESSURE: 211 MMHG | DIASTOLIC BLOOD PRESSURE: 88 MMHG

## 2020-11-08 VITALS — SYSTOLIC BLOOD PRESSURE: 104 MMHG | DIASTOLIC BLOOD PRESSURE: 55 MMHG

## 2020-11-08 VITALS — SYSTOLIC BLOOD PRESSURE: 173 MMHG | DIASTOLIC BLOOD PRESSURE: 63 MMHG

## 2020-11-08 VITALS — DIASTOLIC BLOOD PRESSURE: 63 MMHG | SYSTOLIC BLOOD PRESSURE: 173 MMHG

## 2020-11-08 DIAGNOSIS — Z87.2: ICD-10-CM

## 2020-11-08 DIAGNOSIS — D64.9: ICD-10-CM

## 2020-11-08 DIAGNOSIS — E11.51: ICD-10-CM

## 2020-11-08 DIAGNOSIS — N17.0: ICD-10-CM

## 2020-11-08 DIAGNOSIS — I50.9: ICD-10-CM

## 2020-11-08 DIAGNOSIS — F41.9: ICD-10-CM

## 2020-11-08 DIAGNOSIS — J96.22: ICD-10-CM

## 2020-11-08 DIAGNOSIS — J44.1: ICD-10-CM

## 2020-11-08 DIAGNOSIS — U07.1: Primary | ICD-10-CM

## 2020-11-08 DIAGNOSIS — J93.83: ICD-10-CM

## 2020-11-08 DIAGNOSIS — E78.5: ICD-10-CM

## 2020-11-08 DIAGNOSIS — Z79.899: ICD-10-CM

## 2020-11-08 DIAGNOSIS — E11.43: ICD-10-CM

## 2020-11-08 DIAGNOSIS — R04.2: ICD-10-CM

## 2020-11-08 DIAGNOSIS — Z87.891: ICD-10-CM

## 2020-11-08 DIAGNOSIS — E78.00: ICD-10-CM
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
## 2020-11-08 DIAGNOSIS — E87.6: ICD-10-CM

## 2020-11-08 DIAGNOSIS — G89.29: ICD-10-CM

## 2020-11-08 DIAGNOSIS — I11.0: ICD-10-CM

## 2020-11-08 DIAGNOSIS — I48.91: ICD-10-CM

## 2020-11-08 DIAGNOSIS — M54.9: ICD-10-CM

## 2020-11-08 DIAGNOSIS — M79.7: ICD-10-CM

## 2020-11-08 DIAGNOSIS — T70.29XA: ICD-10-CM

## 2020-11-08 DIAGNOSIS — G93.41: ICD-10-CM

## 2020-11-08 DIAGNOSIS — Z90.710: ICD-10-CM

## 2020-11-08 DIAGNOSIS — J44.0: ICD-10-CM

## 2020-11-08 DIAGNOSIS — J96.21: ICD-10-CM

## 2020-11-08 DIAGNOSIS — E66.9: ICD-10-CM

## 2020-11-08 DIAGNOSIS — D69.6: ICD-10-CM

## 2020-11-08 DIAGNOSIS — I27.20: ICD-10-CM

## 2020-11-08 DIAGNOSIS — K31.84: ICD-10-CM

## 2020-11-08 DIAGNOSIS — J12.89: ICD-10-CM

## 2020-11-08 DIAGNOSIS — I42.9: ICD-10-CM

## 2020-11-08 LAB
ANION GAP SERPL CALC-SCNC: 8 MMOL/L (ref 7–16)
BUN SERPL-MCNC: 11 MG/DL (ref 7–18)
CALCIUM SERPL-MCNC: 8.1 MG/DL (ref 8.5–10.1)
CHLORIDE SERPL-SCNC: 102 MMOL/L (ref 98–107)
CO2 SERPL-SCNC: 27 MMOL/L (ref 21–32)
CREAT SERPL-MCNC: 1 MG/DL (ref 0.6–1)
EOSINOPHIL NFR BLD: 2 % (ref 0–3)
ERYTHROCYTE [DISTWIDTH] IN BLOOD BY AUTOMATED COUNT: 16.9 % (ref 10.5–14.5)
GLUCOSE SERPL-MCNC: 270 MG/DL (ref 74–106)
GRANULOCYTES NFR BLD MANUAL: 61 % (ref 36–66)
HCT VFR BLD CALC: 31.8 % (ref 37–47)
HGB BLD-MCNC: 10.1 GM/DL (ref 12–15)
LYMPHOCYTES NFR BLD AUTO: 21 % (ref 24–44)
MCH RBC QN AUTO: 26 PG (ref 26–34)
MCHC RBC AUTO-ENTMCNC: 31.8 G/DL (ref 28–37)
MCV RBC: 81.9 FL (ref 80–100)
MONOCYTES NFR BLD: 10 % (ref 1–8)
NEUTROPHILS # BLD: 3.4 THOU/UL (ref 1.4–8.2)
NEUTS BAND NFR BLD: 6 % (ref 0–8)
POTASSIUM SERPL-SCNC: 4.2 MMOL/L (ref 3.5–5.1)
RBC # BLD AUTO: 3.88 MIL/UL (ref 4.2–5)
SODIUM SERPL-SCNC: 137 MMOL/L (ref 136–145)
TROPONIN I SERPL-MCNC: <0.06 NG/ML (ref ?–0.06)
WBC # BLD AUTO: 5 THOU/UL (ref 4–11)

## 2020-11-08 PROCEDURE — 10879: CPT

## 2020-11-08 PROCEDURE — 27000 TENOTOMY ADDUCTOR HIP PERQ: CPT

## 2020-11-08 PROCEDURE — 10078: CPT

## 2020-11-08 PROCEDURE — 85076: CPT

## 2020-11-08 NOTE — NUR
CT CALLS AND STATES THEY ARE UNABLE TO TO CT SCAN THROUGH THAT IV. IV TEAM HAD'
TO START IV AND DIDNT SEE ANY OTHER SITES. NIRU NOTIFIED. WILL HOLD OFF ON
SCAN FOR NOW

## 2020-11-09 VITALS — DIASTOLIC BLOOD PRESSURE: 45 MMHG | SYSTOLIC BLOOD PRESSURE: 112 MMHG

## 2020-11-09 VITALS — DIASTOLIC BLOOD PRESSURE: 65 MMHG | SYSTOLIC BLOOD PRESSURE: 115 MMHG

## 2020-11-09 VITALS — DIASTOLIC BLOOD PRESSURE: 66 MMHG | SYSTOLIC BLOOD PRESSURE: 119 MMHG

## 2020-11-09 VITALS — DIASTOLIC BLOOD PRESSURE: 61 MMHG | SYSTOLIC BLOOD PRESSURE: 123 MMHG

## 2020-11-09 VITALS — DIASTOLIC BLOOD PRESSURE: 46 MMHG | SYSTOLIC BLOOD PRESSURE: 132 MMHG

## 2020-11-09 VITALS — SYSTOLIC BLOOD PRESSURE: 107 MMHG | DIASTOLIC BLOOD PRESSURE: 50 MMHG

## 2020-11-09 LAB
ANION GAP SERPL CALC-SCNC: 10 MMOL/L (ref 7–16)
BUN SERPL-MCNC: 15 MG/DL (ref 7–18)
CALCIUM SERPL-MCNC: 8.3 MG/DL (ref 8.5–10.1)
CHLORIDE SERPL-SCNC: 104 MMOL/L (ref 98–107)
CO2 SERPL-SCNC: 25 MMOL/L (ref 21–32)
CREAT SERPL-MCNC: 1.2 MG/DL (ref 0.6–1)
ERYTHROCYTE [DISTWIDTH] IN BLOOD BY AUTOMATED COUNT: 16.7 % (ref 10.5–14.5)
GLUCOSE SERPL-MCNC: 225 MG/DL (ref 74–106)
HCT VFR BLD CALC: 31.5 % (ref 37–47)
HGB BLD-MCNC: 10 GM/DL (ref 12–15)
MCH RBC QN AUTO: 25.9 PG (ref 26–34)
MCHC RBC AUTO-ENTMCNC: 31.7 G/DL (ref 28–37)
MCV RBC: 81.7 FL (ref 80–100)
PLATELET # BLD: 134 THOU/UL (ref 150–400)
POTASSIUM SERPL-SCNC: 3.9 MMOL/L (ref 3.5–5.1)
RBC # BLD AUTO: 3.85 MIL/UL (ref 4.2–5)
SODIUM SERPL-SCNC: 139 MMOL/L (ref 136–145)
WBC # BLD AUTO: 4.2 THOU/UL (ref 4–11)

## 2020-11-09 PROCEDURE — 02HV33Z INSERTION OF INFUSION DEVICE INTO SUPERIOR VENA CAVA, PERCUTANEOUS APPROACH: ICD-10-PCS | Performed by: HOSPITALIST

## 2020-11-09 NOTE — NUR
INITIAL ASSESSMENT:
Received consult for discharge planning. LEA reviewed chart and spoke with
nursing and attending physician. Pt was admitted from home due to COPD
exacerbation. Pt placed in Enhanced Isolation due to COVID-19. Pt had positive
test on 11/8. Pt was recently discharged home on 10/30 with [facility].  Pt
is on 3L of O2 and on IV abx and IV steroids. Pt known to SW from
previous hospitalizations. LEA placed call to pt's room. No answer. SW left
voice message for her son, Steven ([phone]).  Pt lives at home with her
son and dtr in law. Pt has a walker and home O2. Pt has used [employer] for home
O2. During last hospital stay, pt stated that she had a concentrator that she
owned from [employer]. Per chart, pt's home O2 concentrator stopped working.  LEA
spoke with Nataliia in intake at [facility] to notify of pt's hospitalization
and being COVID positive. Pt has been to [facility], 
and [facility]. Pt's PCP is Dr. Anatoliy Wheat. Therapy will need
to be ordered when pt is able to participate to assist with recommendations
for discharge.  LEA is following to assist as needed with discharge planning.

## 2020-11-09 NOTE — NUR
PT CARE TAKEN OVER AT 0700, PT ALERT AND ORIENTED X4, SLEEPING BUT EASILY
AROUSED. PT DENIES ANY PAIN, NAUSEA AND VOMITTING. PT IS ON 4L OF OXYGEN, SOB
WITH EXERTION. PT LUNGS SOUND COARSE AND WHEEZY. PT REFUSE TO GET UP IN CHAIR.
FALL PRECAUTIONS IN PLACE. DENIES ANY NEEDS AT THE MOMENT, WILL CONTINUE TO
MONITOR.

## 2020-11-09 NOTE — NUR
1600 PT HA NOT UINATED SINCE START OF SHIFT, BLADDER SCAN SHOWED 197ML. PT GOT
UP TO COMMODE, PT STATE SHE WASNT ABLE TO URINATE
1729 DR. SUDHA NAVARRETE, UPDATE ABOUT SERO UINE OUTPUT, GAVE VERBAL ORDERS FOR
URINARY CATHETER.

## 2020-11-09 NOTE — NUR
ASSUNMED CARE OF PT FROM ER AT 2105HRS. PT IS AOX4 BUT IS SOMETIMES
FORGETFUL. FALL PRECAUCAUTION IN PLACE. PT WAS ORIENTED TO HER ROOM AND THE
UNIT. PT WAS ABLE TO ANSWER ALL ADMISSION RELATED QUESTIONS. PT IS ON 2L O2
VIA NC. PT HAS NO OPEN WOUNDS BUT BRUISES ALL OVER. PT COMPLAINS OF PAIN AND
SOA WITH ACTIVITY. PT DENIES NAUSEA. PT TOOL ALL HS MEDS WHOLE WITH WATER.
PT RUNS SR ON TELE. PT WAS ABLE TO GET COMFORTABLE AND SLEEP PART OF THE
SHIFT. WILL CONTINUE TO MONITOR. Addended by: GIULIANO DOS SANTOS on: 10/11/2017 07:00 AM     Modules accepted: Orders

## 2020-11-09 NOTE — EKG
[facility]
[place]   [address]                     ELECTROCARDIOGRAM REPORT      
_______________________________________________________________________________
 
Name:       TERENCE BUCIO              Room #:         363-P       ADM IN  
M.R.#:      [id number]                       Account #:      [id number]  
Admission:  20    Attend Phys:    Genia Teran MD  
Discharge:              Date of Birth:  [birth year]  
                                                          Report #: [id number]
                                                                    [id number]
_______________________________________________________________________________
THIS REPORT FOR:  
 
cc:  Anatoliy Wheat MD, Bernard O. MD Lundgren, Craig H. MD [facility]                                        ~
THIS REPORT FOR:   //name//                          
 
                         [facility] ED
                                       
Test Date:    2020               Test Time:    17:09:16
Pat Name:     TERENCE BUCIO           Department:   
Patient ID:   [id number]            Room:         [facility]
Gender:       F                        Technician:   víctor
:          1945               Requested By: Bryan Lopez
Order Number: [id number] MD:   Baudilio Nicole
                                 Measurements
Intervals                              Axis          
Rate:         110                      P:            33
CO:           195                      QRS:          -26
QRSD:         97                       T:            90
QT:           304                                    
QTc:          412                                    
                           Interpretive Statements
Sinus tachycardia
Borderline left axis deviation
Poor R wave progression
Borderline repolarization abnormality
Compared to ECG 2020 19:09:24
No significant change was found
Electronically Signed On 2020 7:46:30 CST by Baudilio Nicole
[email or web address]
 
 
 
 
 
 
 
 
 
 
 
 
 
  <ELECTRONICALLY SIGNED>
   By: Baudilio Nicole MD, [facility]   
  20     0746
D: 20 1709                           _____________________________________
T: 20 1709                           Baudilio Nicole MD, [facility]     /EPI

## 2020-11-10 VITALS — DIASTOLIC BLOOD PRESSURE: 64 MMHG | SYSTOLIC BLOOD PRESSURE: 130 MMHG

## 2020-11-10 VITALS — DIASTOLIC BLOOD PRESSURE: 76 MMHG | SYSTOLIC BLOOD PRESSURE: 145 MMHG

## 2020-11-10 VITALS — SYSTOLIC BLOOD PRESSURE: 131 MMHG | DIASTOLIC BLOOD PRESSURE: 69 MMHG

## 2020-11-10 VITALS — DIASTOLIC BLOOD PRESSURE: 76 MMHG | SYSTOLIC BLOOD PRESSURE: 136 MMHG

## 2020-11-10 VITALS — SYSTOLIC BLOOD PRESSURE: 153 MMHG | DIASTOLIC BLOOD PRESSURE: 73 MMHG

## 2020-11-10 VITALS — SYSTOLIC BLOOD PRESSURE: 144 MMHG | DIASTOLIC BLOOD PRESSURE: 61 MMHG

## 2020-11-10 LAB
ALBUMIN SERPL-MCNC: 2.1 G/DL (ref 3.4–5)
ALT SERPL-CCNC: 16 U/L (ref 30–65)
ANION GAP SERPL CALC-SCNC: 10 MMOL/L (ref 7–16)
AST SERPL-CCNC: 16 U/L (ref 15–37)
BILIRUB DIRECT SERPL-MCNC: 0.2 MG/DL
BILIRUB SERPL-MCNC: 0.5 MG/DL (ref 0.2–1)
BUN SERPL-MCNC: 30 MG/DL (ref 7–18)
CALCIUM SERPL-MCNC: 8.6 MG/DL (ref 8.5–10.1)
CHLORIDE SERPL-SCNC: 103 MMOL/L (ref 98–107)
CO2 SERPL-SCNC: 24 MMOL/L (ref 21–32)
CREAT SERPL-MCNC: 1.5 MG/DL (ref 0.6–1)
ERYTHROCYTE [DISTWIDTH] IN BLOOD BY AUTOMATED COUNT: 16.6 % (ref 10.5–14.5)
GLUCOSE SERPL-MCNC: 296 MG/DL (ref 74–106)
HCT VFR BLD CALC: 31.1 % (ref 37–47)
HGB BLD-MCNC: 10 GM/DL (ref 12–15)
MCH RBC QN AUTO: 26 PG (ref 26–34)
MCHC RBC AUTO-ENTMCNC: 32.1 G/DL (ref 28–37)
MCV RBC: 81.2 FL (ref 80–100)
PLATELET # BLD: 140 THOU/UL (ref 150–400)
POTASSIUM SERPL-SCNC: 3.9 MMOL/L (ref 3.5–5.1)
PROT SERPL-MCNC: 5.2 G/DL (ref 6.4–8.2)
RBC # BLD AUTO: 3.83 MIL/UL (ref 4.2–5)
SODIUM SERPL-SCNC: 137 MMOL/L (ref 136–145)
WBC # BLD AUTO: 6.1 THOU/UL (ref 4–11)

## 2020-11-10 PROCEDURE — 30233K1 TRANSFUSION OF NONAUTOLOGOUS FROZEN PLASMA INTO PERIPHERAL VEIN, PERCUTANEOUS APPROACH: ICD-10-PCS | Performed by: HOSPITALIST

## 2020-11-10 PROCEDURE — XW033E5 INTRODUCTION OF REMDESIVIR ANTI-INFECTIVE INTO PERIPHERAL VEIN, PERCUTANEOUS APPROACH, NEW TECHNOLOGY GROUP 5: ICD-10-PCS

## 2020-11-10 NOTE — NUR
LEA reviewed chart and spoke with nursing and attending physician. Pt remains
in Enhanced Isolation due to COVID-19. Pt is afebrile and on 5L of O2. Pt is
on IV abx and IV steroids. ID consulted. Pt to have convalescent plasma and
start course of Remdesivir. LEA spoke with pt via phone. Introduced role of SW.
Pt is alert/orientated. Pt confirms that she does still reside with her son
and dtr-in-law. Pt states that her home O2 concentrator has not been working.
Pt reports that her insurance had paid for it, so [employer] had stopped servicing
the equipment. Pt requests to use [employer] for a new concentrator and she is
interested in a portable concentrator. SW explained that insurance may not
cover both, but will have [employer] check. Pt verbalized understanding. Therapy to
evaluate pt when she is able to participate. LEA faxed face sheet and clinical
info to [facility] for review. Notified [facility] liaisonSamir. LEA is following to
assist as needed with discharge planning.

## 2020-11-10 NOTE — NUR
ASSESSED AT START OF SHIFT. PT DRWOSY EASILY AROUSABLE. EVENING MEDS GIVEN. HS
XANAX NOT GIVEN. PICC INTACT AND ABX GIVEN. PT REPOSITIONED FOR COMFORT. ON 4L
OF O2. AFEBRILE. FALL PREC IN PLACE, WILL CONT WITH POC TILL EOS.

## 2020-11-11 VITALS — SYSTOLIC BLOOD PRESSURE: 168 MMHG | DIASTOLIC BLOOD PRESSURE: 67 MMHG

## 2020-11-11 VITALS — SYSTOLIC BLOOD PRESSURE: 149 MMHG | DIASTOLIC BLOOD PRESSURE: 69 MMHG

## 2020-11-11 VITALS — DIASTOLIC BLOOD PRESSURE: 86 MMHG | SYSTOLIC BLOOD PRESSURE: 154 MMHG

## 2020-11-11 VITALS — SYSTOLIC BLOOD PRESSURE: 150 MMHG | DIASTOLIC BLOOD PRESSURE: 65 MMHG

## 2020-11-11 VITALS — SYSTOLIC BLOOD PRESSURE: 144 MMHG | DIASTOLIC BLOOD PRESSURE: 59 MMHG

## 2020-11-11 VITALS — SYSTOLIC BLOOD PRESSURE: 120 MMHG | DIASTOLIC BLOOD PRESSURE: 86 MMHG

## 2020-11-11 VITALS — DIASTOLIC BLOOD PRESSURE: 63 MMHG | SYSTOLIC BLOOD PRESSURE: 153 MMHG

## 2020-11-11 VITALS — DIASTOLIC BLOOD PRESSURE: 68 MMHG | SYSTOLIC BLOOD PRESSURE: 152 MMHG

## 2020-11-11 VITALS — DIASTOLIC BLOOD PRESSURE: 39 MMHG | SYSTOLIC BLOOD PRESSURE: 150 MMHG

## 2020-11-11 VITALS — SYSTOLIC BLOOD PRESSURE: 128 MMHG | DIASTOLIC BLOOD PRESSURE: 48 MMHG

## 2020-11-11 VITALS — SYSTOLIC BLOOD PRESSURE: 128 MMHG | DIASTOLIC BLOOD PRESSURE: 41 MMHG

## 2020-11-11 VITALS — DIASTOLIC BLOOD PRESSURE: 81 MMHG | SYSTOLIC BLOOD PRESSURE: 152 MMHG

## 2020-11-11 LAB
ALBUMIN SERPL-MCNC: 2.3 G/DL (ref 3.4–5)
ALT SERPL-CCNC: 17 U/L (ref 30–65)
ANION GAP SERPL CALC-SCNC: 9 MMOL/L (ref 7–16)
AST SERPL-CCNC: 22 U/L (ref 15–37)
BASOPHILS NFR BLD AUTO: 0.3 % (ref 0–2)
BE(VIVO): -6.5 MMOL/L
BILIRUB SERPL-MCNC: 0.6 MG/DL (ref 0.2–1)
BILIRUB UR-MCNC: NEGATIVE MG/DL
BUN SERPL-MCNC: 33 MG/DL (ref 7–18)
CALCIUM SERPL-MCNC: 9 MG/DL (ref 8.5–10.1)
CHLORIDE SERPL-SCNC: 103 MMOL/L (ref 98–107)
CO2 SERPL-SCNC: 27 MMOL/L (ref 21–32)
COLOR UR: YELLOW
CREAT SERPL-MCNC: 1.3 MG/DL (ref 0.6–1)
EOSINOPHIL NFR BLD: 0 % (ref 0–3)
ERYTHROCYTE [DISTWIDTH] IN BLOOD BY AUTOMATED COUNT: 16.8 % (ref 10.5–14.5)
FIBRINOGEN PPP-MCNC: 475.5 MG/DL (ref 210–360)
GLUCOSE SERPL-MCNC: 194 MG/DL (ref 74–106)
GRANULOCYTES NFR BLD MANUAL: 92 % (ref 36–66)
HCO3 BLD-SCNC: 19 MMOL/L (ref 22–26)
HCT VFR BLD CALC: 34.3 % (ref 37–47)
HGB BLD-MCNC: 10.9 GM/DL (ref 12–15)
INR PPP: 1
KETONES UR STRIP-MCNC: NEGATIVE MG/DL
LYMPHOCYTES NFR BLD AUTO: 3.4 % (ref 24–44)
MCH RBC QN AUTO: 25.4 PG (ref 26–34)
MCHC RBC AUTO-ENTMCNC: 31.6 G/DL (ref 28–37)
MCV RBC: 80.5 FL (ref 80–100)
MONOCYTES NFR BLD: 4.3 % (ref 1–8)
NEUTROPHILS # BLD: 14.9 THOU/UL (ref 1.4–8.2)
PCO2 BLD: 38.1 MMHG (ref 35–45)
PLATELET # BLD: 160 THOU/UL (ref 150–400)
PO2 BLD: 100.1 MMHG (ref 80–100)
POTASSIUM SERPL-SCNC: 4.1 MMOL/L (ref 3.5–5.1)
PROT SERPL-MCNC: 6.6 G/DL (ref 6.4–8.2)
PROTHROMBIN TIME: 9.7 SECONDS (ref 9.3–11.4)
RBC # BLD AUTO: 4.26 MIL/UL (ref 4.2–5)
RBC # UR STRIP: NEGATIVE /UL
SODIUM SERPL-SCNC: 139 MMOL/L (ref 136–145)
SP GR UR STRIP: 1.02 (ref 1–1.03)
URINE CLARITY: CLEAR
URINE GLUCOSE-RANDOM*: NEGATIVE
URINE LEUKOCYTES-REFLEX: NEGATIVE
URINE NITRITE-REFLEX: NEGATIVE
URINE PROTEIN (DIPSTICK): NEGATIVE
UROBILINOGEN UR STRIP-ACNC: 0.2 E.U./DL (ref 0.2–1)
WBC # BLD AUTO: 16.2 THOU/UL (ref 4–11)

## 2020-11-11 PROCEDURE — 5A09357 ASSISTANCE WITH RESPIRATORY VENTILATION, LESS THAN 24 CONSECUTIVE HOURS, CONTINUOUS POSITIVE AIRWAY PRESSURE: ICD-10-PCS | Performed by: HOSPITALIST

## 2020-11-11 PROCEDURE — 5A0935A ASSISTANCE WITH RESPIRATORY VENTILATION, LESS THAN 24 CONSECUTIVE HOURS, HIGH NASAL FLOW/VELOCITY: ICD-10-PCS

## 2020-11-11 NOTE — NUR
PT CARE ASSUMED AT 0700, PT ALERT AND ORIENTED X3, CONFUSED AND FORGETUL. PT
DENIES ANY PAIN, NAUSEA AND VOMITTING. PT WAS ON PLACED ON OPTIFLOW, PT
CONTINUE TO DESAT, AND WORSENING SHORT OF AIR. DR. ALEX DUENAS, GAVE ORDERS TO
PUT PT ON BIPAP, GET ABG AND TRANSFER PT TO ICU. DR. CASTILLO ON THE FLOOR,
UPDATED ABOLUT CARE, GACE VERBAL ORDERS TO STOP FLUIDS AND HOLD PT XANAX,
SINCE PT CONTINUES TO BE CONFUSE.
1000 WAITING FOR AN ICU BED, CALLED PT KOSTA MURPHY AND UPDATED ABOUT CARE AND
TRANSFER TO ICU
1400 PT RESTING, BIPAP ON, OXYGEN SAT WNL. CALLED ICU TO GIVE REPORT, WAITING
FOR A CALL BACK.

## 2020-11-11 NOTE — NUR
PATIENT TRANSFERS TO ICU AT 1750. 100% ON BIPAP. PATIENT ORIENTED TO SELF.
AROUSES TO STIULATION. OPENS EYES WHEN ASKED. FOLLOWS COMMANDS.

## 2020-11-11 NOTE — NUR
SPOKE WITH PTS SON. JUST CLARIFIED THAT PT IS TO REMAIN A FULL CODE
AND TO BE INTUBATED IF NECESSARY.

## 2020-11-11 NOTE — NUR
ORDERS RECEIVED FOR PT EVAL AND TREAT. Pt TO TRANSFER TO ICU D/T NEED FOR
HIGHER LEVEL OF CARE. Pt CURRENTLY ON HIGH FLOW VS NRB MASK. WILL NEED NEW PT
ORDERS TO INITIATE PT EVALUATION WHEN Pt MEDICALLY APPROPRIATE.

## 2020-11-11 NOTE — NUR
LEA reviewed chart and spoke with nursing and attending physician. Pt remains
in Enhanced Isolation due to COVID-19. Pt is afebrile. Pt requiring 15L NRB
and placed on bipap. Pt transferred to ICU earlier this morning. Pt is on IV
abx and IV steroids. Pt is completing course of Remdesivir. LEA updated ICU RN
CM. Following to assist as needed with discharge planning.

## 2020-11-12 VITALS — DIASTOLIC BLOOD PRESSURE: 50 MMHG | SYSTOLIC BLOOD PRESSURE: 158 MMHG

## 2020-11-12 VITALS — DIASTOLIC BLOOD PRESSURE: 66 MMHG | SYSTOLIC BLOOD PRESSURE: 120 MMHG

## 2020-11-12 VITALS — DIASTOLIC BLOOD PRESSURE: 62 MMHG | SYSTOLIC BLOOD PRESSURE: 110 MMHG

## 2020-11-12 VITALS — DIASTOLIC BLOOD PRESSURE: 76 MMHG | SYSTOLIC BLOOD PRESSURE: 153 MMHG

## 2020-11-12 VITALS — SYSTOLIC BLOOD PRESSURE: 125 MMHG | DIASTOLIC BLOOD PRESSURE: 58 MMHG

## 2020-11-12 VITALS — SYSTOLIC BLOOD PRESSURE: 168 MMHG | DIASTOLIC BLOOD PRESSURE: 69 MMHG

## 2020-11-12 VITALS — DIASTOLIC BLOOD PRESSURE: 54 MMHG | SYSTOLIC BLOOD PRESSURE: 123 MMHG

## 2020-11-12 VITALS — DIASTOLIC BLOOD PRESSURE: 67 MMHG | SYSTOLIC BLOOD PRESSURE: 103 MMHG

## 2020-11-12 VITALS — DIASTOLIC BLOOD PRESSURE: 61 MMHG | SYSTOLIC BLOOD PRESSURE: 132 MMHG

## 2020-11-12 VITALS — SYSTOLIC BLOOD PRESSURE: 183 MMHG | DIASTOLIC BLOOD PRESSURE: 70 MMHG

## 2020-11-12 VITALS — SYSTOLIC BLOOD PRESSURE: 188 MMHG | DIASTOLIC BLOOD PRESSURE: 70 MMHG

## 2020-11-12 VITALS — DIASTOLIC BLOOD PRESSURE: 56 MMHG | SYSTOLIC BLOOD PRESSURE: 135 MMHG

## 2020-11-12 VITALS — DIASTOLIC BLOOD PRESSURE: 53 MMHG | SYSTOLIC BLOOD PRESSURE: 109 MMHG

## 2020-11-12 VITALS — SYSTOLIC BLOOD PRESSURE: 158 MMHG | DIASTOLIC BLOOD PRESSURE: 63 MMHG

## 2020-11-12 VITALS — DIASTOLIC BLOOD PRESSURE: 66 MMHG | SYSTOLIC BLOOD PRESSURE: 169 MMHG

## 2020-11-12 VITALS — DIASTOLIC BLOOD PRESSURE: 79 MMHG | SYSTOLIC BLOOD PRESSURE: 141 MMHG

## 2020-11-12 VITALS — DIASTOLIC BLOOD PRESSURE: 64 MMHG | SYSTOLIC BLOOD PRESSURE: 126 MMHG

## 2020-11-12 VITALS — SYSTOLIC BLOOD PRESSURE: 122 MMHG | DIASTOLIC BLOOD PRESSURE: 65 MMHG

## 2020-11-12 VITALS — DIASTOLIC BLOOD PRESSURE: 56 MMHG | SYSTOLIC BLOOD PRESSURE: 133 MMHG

## 2020-11-12 VITALS — DIASTOLIC BLOOD PRESSURE: 55 MMHG | SYSTOLIC BLOOD PRESSURE: 133 MMHG

## 2020-11-12 VITALS — SYSTOLIC BLOOD PRESSURE: 129 MMHG | DIASTOLIC BLOOD PRESSURE: 66 MMHG

## 2020-11-12 VITALS — SYSTOLIC BLOOD PRESSURE: 141 MMHG | DIASTOLIC BLOOD PRESSURE: 79 MMHG

## 2020-11-12 VITALS — SYSTOLIC BLOOD PRESSURE: 149 MMHG | DIASTOLIC BLOOD PRESSURE: 92 MMHG

## 2020-11-12 VITALS — DIASTOLIC BLOOD PRESSURE: 56 MMHG | SYSTOLIC BLOOD PRESSURE: 145 MMHG

## 2020-11-12 VITALS — SYSTOLIC BLOOD PRESSURE: 148 MMHG | DIASTOLIC BLOOD PRESSURE: 61 MMHG

## 2020-11-12 VITALS — SYSTOLIC BLOOD PRESSURE: 116 MMHG | DIASTOLIC BLOOD PRESSURE: 69 MMHG

## 2020-11-12 VITALS — SYSTOLIC BLOOD PRESSURE: 138 MMHG | DIASTOLIC BLOOD PRESSURE: 52 MMHG

## 2020-11-12 VITALS — DIASTOLIC BLOOD PRESSURE: 68 MMHG | SYSTOLIC BLOOD PRESSURE: 149 MMHG

## 2020-11-12 VITALS — DIASTOLIC BLOOD PRESSURE: 46 MMHG | SYSTOLIC BLOOD PRESSURE: 136 MMHG

## 2020-11-12 LAB
ANION GAP SERPL CALC-SCNC: 11 MMOL/L (ref 7–16)
ANION GAP SERPL CALC-SCNC: 7 MMOL/L (ref 7–16)
BE(VIVO): -0.8 MMOL/L
BE(VIVO): 0 MMOL/L
BE(VIVO): 3.3 MMOL/L
BUN SERPL-MCNC: 33 MG/DL (ref 7–18)
BUN SERPL-MCNC: 37 MG/DL (ref 7–18)
CALCIUM SERPL-MCNC: 8.1 MG/DL (ref 8.5–10.1)
CALCIUM SERPL-MCNC: 8.6 MG/DL (ref 8.5–10.1)
CHLORIDE SERPL-SCNC: 106 MMOL/L (ref 98–107)
CHLORIDE SERPL-SCNC: 109 MMOL/L (ref 98–107)
CO2 SERPL-SCNC: 27 MMOL/L (ref 21–32)
CO2 SERPL-SCNC: 30 MMOL/L (ref 21–32)
CREAT SERPL-MCNC: 1 MG/DL (ref 0.6–1)
CREAT SERPL-MCNC: 1.2 MG/DL (ref 0.6–1)
ERYTHROCYTE [DISTWIDTH] IN BLOOD BY AUTOMATED COUNT: 17.1 % (ref 10.5–14.5)
GLUCOSE SERPL-MCNC: 160 MG/DL (ref 74–106)
GLUCOSE SERPL-MCNC: 291 MG/DL (ref 74–106)
HCO3 BLD-SCNC: 22.4 MMOL/L (ref 22–26)
HCO3 BLD-SCNC: 23.5 MMOL/L (ref 22–26)
HCO3 BLD-SCNC: 26.5 MMOL/L (ref 22–26)
HCT VFR BLD CALC: 33.3 % (ref 37–47)
HGB BLD-MCNC: 10.5 GM/DL (ref 12–15)
MAGNESIUM SERPL-MCNC: 1.8 MG/DL (ref 1.8–2.4)
MCH RBC QN AUTO: 25.3 PG (ref 26–34)
MCHC RBC AUTO-ENTMCNC: 31.7 G/DL (ref 28–37)
MCV RBC: 79.7 FL (ref 80–100)
PCO2 BLD: 32.2 MMHG (ref 35–45)
PCO2 BLD: 33.9 MMHG (ref 35–45)
PCO2 BLD: 35.3 MMHG (ref 35–45)
PLATELET # BLD: 178 THOU/UL (ref 150–400)
PO2 BLD: 69.7 MMHG (ref 80–100)
PO2 BLD: 70 MMHG (ref 80–100)
PO2 BLD: 95.9 MMHG (ref 80–100)
POTASSIUM SERPL-SCNC: 2.7 MMOL/L (ref 3.5–5.1)
POTASSIUM SERPL-SCNC: 3.1 MMOL/L (ref 3.5–5.1)
RBC # BLD AUTO: 4.17 MIL/UL (ref 4.2–5)
SODIUM SERPL-SCNC: 144 MMOL/L (ref 136–145)
SODIUM SERPL-SCNC: 146 MMOL/L (ref 136–145)
WBC # BLD AUTO: 12 THOU/UL (ref 4–11)

## 2020-11-12 PROCEDURE — 5A09357 ASSISTANCE WITH RESPIRATORY VENTILATION, LESS THAN 24 CONSECUTIVE HOURS, CONTINUOUS POSITIVE AIRWAY PRESSURE: ICD-10-PCS | Performed by: HOSPITALIST

## 2020-11-12 NOTE — NUR
PT REMAINS EXTREMELY AGITATED YELLING AND SCREAMING. PT PULLED OUT HER QIU
CATH WITH BALLOON INFLATED. HIT ME  THE NURSE 3 TIMES. RESTRAINTS APPLIED
PRECEDEX GTT STARTED FOR SEDATION.  WILL CONT TO MONITOR

## 2020-11-12 NOTE — NUR
PT CARE ASSUMMED AT 0700, UPON SHIFT CHANGE PT WAS EXTREMELY AGITATED AND
COMABTIVE. CONFUSED AND DELIRIOUS. PT CONTINUES TO BE ON BIPAP, SOB WITH
EXERTION AND TACHYPNEIC AT TIMES. PT HAS RESTRAINTS DUE TO PULLING OUT MEDICAL
DEVICES. HAS PRECEDEX RUNNING AT 0.7 CURRENTLY. QIU CATHETER IN PLACE,
PATENT AND SECURED. REPOSITIONED AT TIMES. FALL PRECAUTIONS IN PLACE. WILL
CONTINUE TO MONITOR.

## 2020-11-12 NOTE — NUR
PT HAS BEEN CALM AND SLEEPING ALL NIGHT AND SUDDENLY WOKE UP
UP FIGHTING. THORNE THAN A CAT!  CUSSING LIKE A . THREATENED
TO CALL THE POLICE IF I DID NOT LET HER GO HOME.

## 2020-11-13 VITALS — DIASTOLIC BLOOD PRESSURE: 74 MMHG | SYSTOLIC BLOOD PRESSURE: 148 MMHG

## 2020-11-13 VITALS — DIASTOLIC BLOOD PRESSURE: 65 MMHG | SYSTOLIC BLOOD PRESSURE: 157 MMHG

## 2020-11-13 VITALS — SYSTOLIC BLOOD PRESSURE: 155 MMHG | DIASTOLIC BLOOD PRESSURE: 71 MMHG

## 2020-11-13 VITALS — DIASTOLIC BLOOD PRESSURE: 78 MMHG | SYSTOLIC BLOOD PRESSURE: 200 MMHG

## 2020-11-13 VITALS — SYSTOLIC BLOOD PRESSURE: 141 MMHG | DIASTOLIC BLOOD PRESSURE: 70 MMHG

## 2020-11-13 VITALS — DIASTOLIC BLOOD PRESSURE: 58 MMHG | SYSTOLIC BLOOD PRESSURE: 134 MMHG

## 2020-11-13 VITALS — SYSTOLIC BLOOD PRESSURE: 191 MMHG | DIASTOLIC BLOOD PRESSURE: 85 MMHG

## 2020-11-13 VITALS — DIASTOLIC BLOOD PRESSURE: 47 MMHG | SYSTOLIC BLOOD PRESSURE: 182 MMHG

## 2020-11-13 VITALS — DIASTOLIC BLOOD PRESSURE: 85 MMHG | SYSTOLIC BLOOD PRESSURE: 191 MMHG

## 2020-11-13 VITALS — DIASTOLIC BLOOD PRESSURE: 90 MMHG | SYSTOLIC BLOOD PRESSURE: 199 MMHG

## 2020-11-13 VITALS — SYSTOLIC BLOOD PRESSURE: 173 MMHG | DIASTOLIC BLOOD PRESSURE: 81 MMHG

## 2020-11-13 VITALS — SYSTOLIC BLOOD PRESSURE: 213 MMHG | DIASTOLIC BLOOD PRESSURE: 93 MMHG

## 2020-11-13 VITALS — SYSTOLIC BLOOD PRESSURE: 143 MMHG | DIASTOLIC BLOOD PRESSURE: 62 MMHG

## 2020-11-13 VITALS — DIASTOLIC BLOOD PRESSURE: 69 MMHG | SYSTOLIC BLOOD PRESSURE: 171 MMHG

## 2020-11-13 VITALS — DIASTOLIC BLOOD PRESSURE: 53 MMHG | SYSTOLIC BLOOD PRESSURE: 151 MMHG

## 2020-11-13 VITALS — DIASTOLIC BLOOD PRESSURE: 81 MMHG | SYSTOLIC BLOOD PRESSURE: 200 MMHG

## 2020-11-13 VITALS — DIASTOLIC BLOOD PRESSURE: 66 MMHG | SYSTOLIC BLOOD PRESSURE: 177 MMHG

## 2020-11-13 VITALS — SYSTOLIC BLOOD PRESSURE: 137 MMHG | DIASTOLIC BLOOD PRESSURE: 59 MMHG

## 2020-11-13 VITALS — SYSTOLIC BLOOD PRESSURE: 204 MMHG | DIASTOLIC BLOOD PRESSURE: 84 MMHG

## 2020-11-13 VITALS — DIASTOLIC BLOOD PRESSURE: 89 MMHG | SYSTOLIC BLOOD PRESSURE: 195 MMHG

## 2020-11-13 VITALS — DIASTOLIC BLOOD PRESSURE: 78 MMHG | SYSTOLIC BLOOD PRESSURE: 154 MMHG

## 2020-11-13 VITALS — DIASTOLIC BLOOD PRESSURE: 91 MMHG | SYSTOLIC BLOOD PRESSURE: 189 MMHG

## 2020-11-13 VITALS — DIASTOLIC BLOOD PRESSURE: 64 MMHG | SYSTOLIC BLOOD PRESSURE: 145 MMHG

## 2020-11-13 VITALS — SYSTOLIC BLOOD PRESSURE: 207 MMHG | DIASTOLIC BLOOD PRESSURE: 67 MMHG

## 2020-11-13 VITALS — DIASTOLIC BLOOD PRESSURE: 86 MMHG | SYSTOLIC BLOOD PRESSURE: 198 MMHG

## 2020-11-13 VITALS — SYSTOLIC BLOOD PRESSURE: 190 MMHG | DIASTOLIC BLOOD PRESSURE: 84 MMHG

## 2020-11-13 VITALS — SYSTOLIC BLOOD PRESSURE: 160 MMHG | DIASTOLIC BLOOD PRESSURE: 67 MMHG

## 2020-11-13 VITALS — DIASTOLIC BLOOD PRESSURE: 84 MMHG | SYSTOLIC BLOOD PRESSURE: 181 MMHG

## 2020-11-13 VITALS — SYSTOLIC BLOOD PRESSURE: 144 MMHG | DIASTOLIC BLOOD PRESSURE: 69 MMHG

## 2020-11-13 LAB
ANION GAP SERPL CALC-SCNC: 8 MMOL/L (ref 7–16)
BUN SERPL-MCNC: 33 MG/DL (ref 7–18)
CALCIUM SERPL-MCNC: 8.7 MG/DL (ref 8.5–10.1)
CHLORIDE SERPL-SCNC: 111 MMOL/L (ref 98–107)
CO2 SERPL-SCNC: 28 MMOL/L (ref 21–32)
CREAT SERPL-MCNC: 0.9 MG/DL (ref 0.6–1)
ERYTHROCYTE [DISTWIDTH] IN BLOOD BY AUTOMATED COUNT: 17.1 % (ref 10.5–14.5)
GLUCOSE SERPL-MCNC: 177 MG/DL (ref 74–106)
HCT VFR BLD CALC: 30 % (ref 37–47)
HGB BLD-MCNC: 9.6 GM/DL (ref 12–15)
MCH RBC QN AUTO: 25.6 PG (ref 26–34)
MCHC RBC AUTO-ENTMCNC: 32.2 G/DL (ref 28–37)
MCV RBC: 79.6 FL (ref 80–100)
PLATELET # BLD: 146 THOU/UL (ref 150–400)
POTASSIUM SERPL-SCNC: 3.7 MMOL/L (ref 3.5–5.1)
RBC # BLD AUTO: 3.77 MIL/UL (ref 4.2–5)
SODIUM SERPL-SCNC: 147 MMOL/L (ref 136–145)
WBC # BLD AUTO: 7 THOU/UL (ref 4–11)

## 2020-11-13 PROCEDURE — 5A09357 ASSISTANCE WITH RESPIRATORY VENTILATION, LESS THAN 24 CONSECUTIVE HOURS, CONTINUOUS POSITIVE AIRWAY PRESSURE: ICD-10-PCS | Performed by: HOSPITALIST

## 2020-11-13 PROCEDURE — 05HA33Z INSERTION OF INFUSION DEVICE INTO LEFT BRACHIAL VEIN, PERCUTANEOUS APPROACH: ICD-10-PCS

## 2020-11-13 NOTE — NUR
VASCULAR ACCESS CALLED FOR ADDITIONAL LINE NEEDED. PT'SLABS,MEDS,HX REVIEWED.
SHRUTI BRACHIAL WAS DEEP BUT WIDELY PATENT WITH USG. 4FR 20CM POWER MIDLINE
INSERTED TO 2CM EXTERNAL WITH BRISK BR. PT TOLERATED WELL. ML RELEASED FOR
IMMEDIATE USE PER PROTOCOL TO ED HINES

## 2020-11-13 NOTE — NUR
PT HAS RESTED QUIETLY ALL NIGHT ON 60 % BIPAP. PRECEDEX GTT AT 0.5 MCG
FOR SEDATION. 700 CC UO THIS SHIFT. ISOLATION FOR COVID POSITIVE.
WILL CONT TO MONITOR.

## 2020-11-13 NOTE — NUR
SPOKE TO ROBBIE AT 1730 AND WAS UPDATED ON PATIENT'S STATUS AND REASSURANCE
GIVEN TO FAMILY.  MESSAGE GIVEN TO PATIENT WHO CONTINUES TO CALL OUT. PRECEDEX
TITRATED FOR COMFORT.  LABATALOL GIVEN AT 1630 WITH ON CHANGE IN BP.  O2 SAT
IN THE MID 90'S.

## 2020-11-13 NOTE — NUR
If pt requires intubation, recommend start enteral nutrition within 48hr of
vital high protein at goal of 55ml/hr

## 2020-11-13 NOTE — NUR
chart review. she cont to require bipap and hf o2, bedside staff noted
agitation. covid +, unable to visit with jono gregg on ppe. cm left
message for son mina. will cont following as needed for dc needs.

## 2020-11-14 VITALS — DIASTOLIC BLOOD PRESSURE: 71 MMHG | SYSTOLIC BLOOD PRESSURE: 156 MMHG

## 2020-11-14 VITALS — SYSTOLIC BLOOD PRESSURE: 188 MMHG | DIASTOLIC BLOOD PRESSURE: 87 MMHG

## 2020-11-14 VITALS — SYSTOLIC BLOOD PRESSURE: 165 MMHG | DIASTOLIC BLOOD PRESSURE: 71 MMHG

## 2020-11-14 VITALS — DIASTOLIC BLOOD PRESSURE: 71 MMHG | SYSTOLIC BLOOD PRESSURE: 144 MMHG

## 2020-11-14 VITALS — DIASTOLIC BLOOD PRESSURE: 70 MMHG | SYSTOLIC BLOOD PRESSURE: 151 MMHG

## 2020-11-14 VITALS — SYSTOLIC BLOOD PRESSURE: 162 MMHG | DIASTOLIC BLOOD PRESSURE: 63 MMHG

## 2020-11-14 VITALS — SYSTOLIC BLOOD PRESSURE: 159 MMHG | DIASTOLIC BLOOD PRESSURE: 77 MMHG

## 2020-11-14 VITALS — SYSTOLIC BLOOD PRESSURE: 142 MMHG | DIASTOLIC BLOOD PRESSURE: 63 MMHG

## 2020-11-14 VITALS — DIASTOLIC BLOOD PRESSURE: 86 MMHG | SYSTOLIC BLOOD PRESSURE: 174 MMHG

## 2020-11-14 VITALS — DIASTOLIC BLOOD PRESSURE: 65 MMHG | SYSTOLIC BLOOD PRESSURE: 153 MMHG

## 2020-11-14 VITALS — SYSTOLIC BLOOD PRESSURE: 161 MMHG | DIASTOLIC BLOOD PRESSURE: 75 MMHG

## 2020-11-14 VITALS — DIASTOLIC BLOOD PRESSURE: 70 MMHG | SYSTOLIC BLOOD PRESSURE: 153 MMHG

## 2020-11-14 VITALS — SYSTOLIC BLOOD PRESSURE: 170 MMHG | DIASTOLIC BLOOD PRESSURE: 72 MMHG

## 2020-11-14 VITALS — DIASTOLIC BLOOD PRESSURE: 73 MMHG | SYSTOLIC BLOOD PRESSURE: 158 MMHG

## 2020-11-14 VITALS — DIASTOLIC BLOOD PRESSURE: 66 MMHG | SYSTOLIC BLOOD PRESSURE: 170 MMHG

## 2020-11-14 VITALS — SYSTOLIC BLOOD PRESSURE: 177 MMHG | DIASTOLIC BLOOD PRESSURE: 74 MMHG

## 2020-11-14 VITALS — DIASTOLIC BLOOD PRESSURE: 64 MMHG | SYSTOLIC BLOOD PRESSURE: 169 MMHG

## 2020-11-14 VITALS — DIASTOLIC BLOOD PRESSURE: 66 MMHG | SYSTOLIC BLOOD PRESSURE: 147 MMHG

## 2020-11-14 VITALS — DIASTOLIC BLOOD PRESSURE: 76 MMHG | SYSTOLIC BLOOD PRESSURE: 164 MMHG

## 2020-11-14 VITALS — DIASTOLIC BLOOD PRESSURE: 70 MMHG | SYSTOLIC BLOOD PRESSURE: 152 MMHG

## 2020-11-14 VITALS — SYSTOLIC BLOOD PRESSURE: 153 MMHG | DIASTOLIC BLOOD PRESSURE: 70 MMHG

## 2020-11-14 VITALS — DIASTOLIC BLOOD PRESSURE: 57 MMHG | SYSTOLIC BLOOD PRESSURE: 146 MMHG

## 2020-11-14 LAB
ALBUMIN SERPL-MCNC: 2.1 G/DL (ref 3.4–5)
ALT SERPL-CCNC: 14 U/L (ref 30–65)
ANION GAP SERPL CALC-SCNC: 10 MMOL/L (ref 7–16)
ANION GAP SERPL CALC-SCNC: 9 MMOL/L (ref 7–16)
AST SERPL-CCNC: 15 U/L (ref 15–37)
BE(VIVO): 4 MMOL/L
BILIRUB DIRECT SERPL-MCNC: 0.2 MG/DL
BILIRUB SERPL-MCNC: 0.5 MG/DL (ref 0.2–1)
BUN SERPL-MCNC: 18 MG/DL (ref 7–18)
BUN SERPL-MCNC: 21 MG/DL (ref 7–18)
CALCIUM SERPL-MCNC: 8.1 MG/DL (ref 8.5–10.1)
CALCIUM SERPL-MCNC: 8.5 MG/DL (ref 8.5–10.1)
CHLORIDE SERPL-SCNC: 108 MMOL/L (ref 98–107)
CHLORIDE SERPL-SCNC: 111 MMOL/L (ref 98–107)
CO2 SERPL-SCNC: 29 MMOL/L (ref 21–32)
CO2 SERPL-SCNC: 30 MMOL/L (ref 21–32)
CREAT SERPL-MCNC: 0.8 MG/DL (ref 0.6–1)
CREAT SERPL-MCNC: 0.8 MG/DL (ref 0.6–1)
ERYTHROCYTE [DISTWIDTH] IN BLOOD BY AUTOMATED COUNT: 17.3 % (ref 10.5–14.5)
GLUCOSE SERPL-MCNC: 252 MG/DL (ref 74–106)
GLUCOSE SERPL-MCNC: 276 MG/DL (ref 74–106)
HCO3 BLD-SCNC: 27.7 MMOL/L (ref 22–26)
HCT VFR BLD CALC: 30.4 % (ref 37–47)
HGB BLD-MCNC: 9.7 GM/DL (ref 12–15)
MCH RBC QN AUTO: 25.5 PG (ref 26–34)
MCHC RBC AUTO-ENTMCNC: 32 G/DL (ref 28–37)
MCV RBC: 79.8 FL (ref 80–100)
PCO2 BLD: 38.2 MMHG (ref 35–45)
PLATELET # BLD: 159 THOU/UL (ref 150–400)
PO2 BLD: 75.4 MMHG (ref 80–100)
POTASSIUM SERPL-SCNC: 2.8 MMOL/L (ref 3.5–5.1)
POTASSIUM SERPL-SCNC: 3 MMOL/L (ref 3.5–5.1)
PROT SERPL-MCNC: 5.9 G/DL (ref 6.4–8.2)
RBC # BLD AUTO: 3.81 MIL/UL (ref 4.2–5)
SODIUM SERPL-SCNC: 147 MMOL/L (ref 136–145)
SODIUM SERPL-SCNC: 150 MMOL/L (ref 136–145)
WBC # BLD AUTO: 7.6 THOU/UL (ref 4–11)

## 2020-11-14 PROCEDURE — 5A09357 ASSISTANCE WITH RESPIRATORY VENTILATION, LESS THAN 24 CONSECUTIVE HOURS, CONTINUOUS POSITIVE AIRWAY PRESSURE: ICD-10-PCS | Performed by: HOSPITALIST

## 2020-11-14 NOTE — NUR
AWAITING REPEAT POTASSIUM AFTER KCL INFUSED.  DR JOHNSON IN AND DISCUSSED NUTRITION
WITH HIM.  RT INFORMED TO SET PATIENT UP ON OPTIFLOW.

## 2020-11-14 NOTE — NUR
DISCUSSED SERUM POTASSIUM AND DAILY LASIX ORDER WITH DR VELASCO.  INFORMED HER
THAT PATIENT IS ON THE POTASSIUM PROTOCOL.

## 2020-11-14 NOTE — NUR
ASSUMED PT CARE AROUND 1900. PT WAS VERY CONFUSED DURING THE NIGHT, AT TIMES
YELLING OUT AND TRYING TO PULL OFF HER BIPAP. BILATERAL WRIST RESTRAINTS IN
PLACE. PRN BP MEDICATIONS GIVEN FOR HTN.  SBP IS NOW 140S-160S. PT WAS VERY
ANXIOUS AND AGITATED DURING FIRST HALF OF THE SHIFT, MAKING HER TACHYCARDIC
AND TACHYPENIC. INCREASED PRECEDEX DRIP. PRN HALDOL GIVEN WITHOUT MUCH
IMPROVEMENT IN BEHAVIOR. IM OLANZAPINE GIVEN AS PT WAS SO AGITATED SHE WAS
PULLING AT BIPAP AND LINES EVEN THOUGH SHE WAS IN RESTRAINTS. PT HAS BEEN
SLEEPING SINCE OLANZAPINE WAS GIVEN. RR AND HR NOW WITHIN NORMAL LIMITS. QIU
TO DD WITH ADEQUATE URINE OUTPUT.  FALL PRECAUTIONS IN PLACE. NOT PROGRESSING
WELL TOWARD POC GOALS.

## 2020-11-14 NOTE — NUR
Taking a few bites of apple sauce without coughing.  Status unchanged.
Progressing towards outcome goals slowly.

## 2020-11-14 NOTE — NUR
PATIENT PLACED ON OPTIFLOW AT 50 LITER AND FIO2 OF 70% AT 1430.  ABG'S DRAWN
AND CALLED TO DR JOHNSON AT 1510.  PATIENT IS RESTING QUIETLY, TAKING A FEW BITES
OF APPLE SAUCE WITHOUT COUGHING OR CHOCKING.  RESP RATE IN THE UPPER TEENS AND
SAT IN THE MID 90'S.  PRECEDEX SLOWLY TAPPERED FOR MOOD TOLERANCE.  POTASSIUM
PROTOCOL CONTINUES FOR LOW POTASSIUM.

## 2020-11-15 VITALS — SYSTOLIC BLOOD PRESSURE: 151 MMHG | DIASTOLIC BLOOD PRESSURE: 92 MMHG

## 2020-11-15 VITALS — SYSTOLIC BLOOD PRESSURE: 125 MMHG | DIASTOLIC BLOOD PRESSURE: 66 MMHG

## 2020-11-15 VITALS — DIASTOLIC BLOOD PRESSURE: 68 MMHG | SYSTOLIC BLOOD PRESSURE: 167 MMHG

## 2020-11-15 VITALS — DIASTOLIC BLOOD PRESSURE: 79 MMHG | SYSTOLIC BLOOD PRESSURE: 148 MMHG

## 2020-11-15 VITALS — SYSTOLIC BLOOD PRESSURE: 160 MMHG | DIASTOLIC BLOOD PRESSURE: 76 MMHG

## 2020-11-15 VITALS — SYSTOLIC BLOOD PRESSURE: 159 MMHG | DIASTOLIC BLOOD PRESSURE: 74 MMHG

## 2020-11-15 VITALS — SYSTOLIC BLOOD PRESSURE: 153 MMHG | DIASTOLIC BLOOD PRESSURE: 75 MMHG

## 2020-11-15 VITALS — DIASTOLIC BLOOD PRESSURE: 69 MMHG | SYSTOLIC BLOOD PRESSURE: 156 MMHG

## 2020-11-15 VITALS — SYSTOLIC BLOOD PRESSURE: 148 MMHG | DIASTOLIC BLOOD PRESSURE: 63 MMHG

## 2020-11-15 VITALS — SYSTOLIC BLOOD PRESSURE: 147 MMHG | DIASTOLIC BLOOD PRESSURE: 80 MMHG

## 2020-11-15 VITALS — SYSTOLIC BLOOD PRESSURE: 173 MMHG | DIASTOLIC BLOOD PRESSURE: 74 MMHG

## 2020-11-15 VITALS — DIASTOLIC BLOOD PRESSURE: 72 MMHG | SYSTOLIC BLOOD PRESSURE: 161 MMHG

## 2020-11-15 VITALS — DIASTOLIC BLOOD PRESSURE: 82 MMHG | SYSTOLIC BLOOD PRESSURE: 160 MMHG

## 2020-11-15 VITALS — SYSTOLIC BLOOD PRESSURE: 161 MMHG | DIASTOLIC BLOOD PRESSURE: 76 MMHG

## 2020-11-15 VITALS — DIASTOLIC BLOOD PRESSURE: 78 MMHG | SYSTOLIC BLOOD PRESSURE: 165 MMHG

## 2020-11-15 VITALS — DIASTOLIC BLOOD PRESSURE: 79 MMHG | SYSTOLIC BLOOD PRESSURE: 141 MMHG

## 2020-11-15 VITALS — DIASTOLIC BLOOD PRESSURE: 80 MMHG | SYSTOLIC BLOOD PRESSURE: 183 MMHG

## 2020-11-15 VITALS — DIASTOLIC BLOOD PRESSURE: 75 MMHG | SYSTOLIC BLOOD PRESSURE: 174 MMHG

## 2020-11-15 VITALS — SYSTOLIC BLOOD PRESSURE: 161 MMHG | DIASTOLIC BLOOD PRESSURE: 67 MMHG

## 2020-11-15 VITALS — SYSTOLIC BLOOD PRESSURE: 141 MMHG | DIASTOLIC BLOOD PRESSURE: 72 MMHG

## 2020-11-15 VITALS — SYSTOLIC BLOOD PRESSURE: 135 MMHG | DIASTOLIC BLOOD PRESSURE: 67 MMHG

## 2020-11-15 VITALS — SYSTOLIC BLOOD PRESSURE: 161 MMHG | DIASTOLIC BLOOD PRESSURE: 81 MMHG

## 2020-11-15 VITALS — SYSTOLIC BLOOD PRESSURE: 155 MMHG | DIASTOLIC BLOOD PRESSURE: 75 MMHG

## 2020-11-15 VITALS — DIASTOLIC BLOOD PRESSURE: 69 MMHG | SYSTOLIC BLOOD PRESSURE: 134 MMHG

## 2020-11-15 VITALS — DIASTOLIC BLOOD PRESSURE: 81 MMHG | SYSTOLIC BLOOD PRESSURE: 174 MMHG

## 2020-11-15 VITALS — DIASTOLIC BLOOD PRESSURE: 63 MMHG | SYSTOLIC BLOOD PRESSURE: 172 MMHG

## 2020-11-15 VITALS — SYSTOLIC BLOOD PRESSURE: 164 MMHG | DIASTOLIC BLOOD PRESSURE: 74 MMHG

## 2020-11-15 LAB
ALBUMIN SERPL-MCNC: 2.1 G/DL (ref 3.4–5)
ALT SERPL-CCNC: 13 U/L (ref 30–65)
ANION GAP SERPL CALC-SCNC: 7 MMOL/L (ref 7–16)
AST SERPL-CCNC: 14 U/L (ref 15–37)
BASOPHILS NFR BLD AUTO: 0 % (ref 0–2)
BE(VIVO): 4.8 MMOL/L
BILIRUB SERPL-MCNC: 0.6 MG/DL (ref 0.2–1)
BUN SERPL-MCNC: 20 MG/DL (ref 7–18)
CALCIUM SERPL-MCNC: 8.4 MG/DL (ref 8.5–10.1)
CHLORIDE SERPL-SCNC: 108 MMOL/L (ref 98–107)
CO2 SERPL-SCNC: 29 MMOL/L (ref 21–32)
CREAT SERPL-MCNC: 0.8 MG/DL (ref 0.6–1)
EOSINOPHIL NFR BLD: 0 % (ref 0–3)
ERYTHROCYTE [DISTWIDTH] IN BLOOD BY AUTOMATED COUNT: 17.4 % (ref 10.5–14.5)
GLUCOSE SERPL-MCNC: 314 MG/DL (ref 74–106)
GRANULOCYTES NFR BLD MANUAL: 85 % (ref 36–66)
HCO3 BLD-SCNC: 29.1 MMOL/L (ref 22–26)
HCT VFR BLD CALC: 32 % (ref 37–47)
HGB BLD-MCNC: 10.2 GM/DL (ref 12–15)
LYMPHOCYTES NFR BLD AUTO: 9 % (ref 24–44)
MAGNESIUM SERPL-MCNC: 2.1 MG/DL (ref 1.8–2.4)
MCH RBC QN AUTO: 25.7 PG (ref 26–34)
MCHC RBC AUTO-ENTMCNC: 32 G/DL (ref 28–37)
MCV RBC: 80.1 FL (ref 80–100)
METAMYELOCYTES NFR BLD: 2 %
MONOCYTES NFR BLD: 4 % (ref 1–8)
PCO2 BLD: 41.7 MMHG (ref 35–45)
PHOSPHATE SERPL-MCNC: 2.8 MG/DL (ref 2.5–4.9)
PLATELET # BLD EST: NORMAL 10*3/UL
PLATELET # BLD: 160 THOU/UL (ref 150–400)
PO2 BLD: 79.7 MMHG (ref 80–100)
POTASSIUM SERPL-SCNC: 3.6 MMOL/L (ref 3.5–5.1)
PROT SERPL-MCNC: 5.7 G/DL (ref 6.4–8.2)
RBC # BLD AUTO: 3.99 MIL/UL (ref 4.2–5)
RBC MORPH BLD: NORMAL
SODIUM SERPL-SCNC: 144 MMOL/L (ref 136–145)
TRIGL SERPL-MCNC: 83 MG/DL (ref ?–150)
WBC # BLD AUTO: 6.9 THOU/UL (ref 4–11)

## 2020-11-15 PROCEDURE — 5A09357 ASSISTANCE WITH RESPIRATORY VENTILATION, LESS THAN 24 CONSECUTIVE HOURS, CONTINUOUS POSITIVE AIRWAY PRESSURE: ICD-10-PCS | Performed by: HOSPITALIST

## 2020-11-15 NOTE — NUR
PATIENT IS PROGRESSING SLOWLY TOWARDS OUTCOME GOALS.  VSS.  PLACED ON OPTIFLOW
AT 1000 THIS AM.  TOLERATING HONEY THICKEN DIET.  POTASSIUM GIVEN PER
PROTOCOL.  WILL CONTINUE TO MONITOR.

## 2020-11-15 NOTE — NUR
PT IS AWAKE AND ALERT TO SELF WITH BOUTS OF CONFUSION. REDIRECTS NURSING CARE
PLAN WITH OXYGENATION WITH DEVICES BIPAP OR OPTI FLOW. FOR NURSING CARE.
VITALS ARE STABLE. RESPOSTION FOR NURSING CARE AND ORAL SWABS. BRUSING NOTED
ON BODAY AND ABDOMEN. AND GENERALIZED EDEMA NOTED WITH ASSESSMENT. SCDS ON
BILAERAL. LABS DRAWN THIS AM AS ORDERED. CUSSES AND SWEARS AT RT WITH HER
BIPAP DOESNT LIKE WEARING IT. RESTRAINTS IN PLACE FOR CARE OF PT AT THIS TIME.
WILL CONTINUE TO MONITOR AND ASSESS PER NURISNG.

## 2020-11-15 NOTE — NUR
ASSUMMED CARE AT O700 FROM [place] THE NIGHT NURSE.  PATIENT IS RESTING QUIETLY
ON BIPAP WITH PRECEDEX  INFUSING. AROUSES EASILY. WILL CONTINUE TO MONITOR.

## 2020-11-16 VITALS — DIASTOLIC BLOOD PRESSURE: 62 MMHG | SYSTOLIC BLOOD PRESSURE: 152 MMHG

## 2020-11-16 VITALS — DIASTOLIC BLOOD PRESSURE: 68 MMHG | SYSTOLIC BLOOD PRESSURE: 160 MMHG

## 2020-11-16 VITALS — DIASTOLIC BLOOD PRESSURE: 43 MMHG | SYSTOLIC BLOOD PRESSURE: 164 MMHG

## 2020-11-16 VITALS — DIASTOLIC BLOOD PRESSURE: 90 MMHG | SYSTOLIC BLOOD PRESSURE: 216 MMHG

## 2020-11-16 VITALS — DIASTOLIC BLOOD PRESSURE: 61 MMHG | SYSTOLIC BLOOD PRESSURE: 142 MMHG

## 2020-11-16 VITALS — DIASTOLIC BLOOD PRESSURE: 68 MMHG | SYSTOLIC BLOOD PRESSURE: 149 MMHG

## 2020-11-16 VITALS — DIASTOLIC BLOOD PRESSURE: 67 MMHG | SYSTOLIC BLOOD PRESSURE: 130 MMHG

## 2020-11-16 VITALS — SYSTOLIC BLOOD PRESSURE: 153 MMHG | DIASTOLIC BLOOD PRESSURE: 65 MMHG

## 2020-11-16 VITALS — SYSTOLIC BLOOD PRESSURE: 186 MMHG | DIASTOLIC BLOOD PRESSURE: 69 MMHG

## 2020-11-16 VITALS — DIASTOLIC BLOOD PRESSURE: 58 MMHG | SYSTOLIC BLOOD PRESSURE: 147 MMHG

## 2020-11-16 VITALS — SYSTOLIC BLOOD PRESSURE: 168 MMHG | DIASTOLIC BLOOD PRESSURE: 74 MMHG

## 2020-11-16 VITALS — DIASTOLIC BLOOD PRESSURE: 62 MMHG | SYSTOLIC BLOOD PRESSURE: 175 MMHG

## 2020-11-16 VITALS — SYSTOLIC BLOOD PRESSURE: 158 MMHG | DIASTOLIC BLOOD PRESSURE: 69 MMHG

## 2020-11-16 VITALS — DIASTOLIC BLOOD PRESSURE: 76 MMHG | SYSTOLIC BLOOD PRESSURE: 131 MMHG

## 2020-11-16 VITALS — DIASTOLIC BLOOD PRESSURE: 59 MMHG | SYSTOLIC BLOOD PRESSURE: 167 MMHG

## 2020-11-16 VITALS — DIASTOLIC BLOOD PRESSURE: 59 MMHG | SYSTOLIC BLOOD PRESSURE: 149 MMHG

## 2020-11-16 VITALS — SYSTOLIC BLOOD PRESSURE: 144 MMHG | DIASTOLIC BLOOD PRESSURE: 61 MMHG

## 2020-11-16 VITALS — SYSTOLIC BLOOD PRESSURE: 140 MMHG | DIASTOLIC BLOOD PRESSURE: 64 MMHG

## 2020-11-16 VITALS — DIASTOLIC BLOOD PRESSURE: 63 MMHG | SYSTOLIC BLOOD PRESSURE: 153 MMHG

## 2020-11-16 VITALS — SYSTOLIC BLOOD PRESSURE: 153 MMHG | DIASTOLIC BLOOD PRESSURE: 59 MMHG

## 2020-11-16 VITALS — SYSTOLIC BLOOD PRESSURE: 158 MMHG | DIASTOLIC BLOOD PRESSURE: 59 MMHG

## 2020-11-16 VITALS — DIASTOLIC BLOOD PRESSURE: 63 MMHG | SYSTOLIC BLOOD PRESSURE: 165 MMHG

## 2020-11-16 VITALS — DIASTOLIC BLOOD PRESSURE: 74 MMHG | SYSTOLIC BLOOD PRESSURE: 149 MMHG

## 2020-11-16 VITALS — SYSTOLIC BLOOD PRESSURE: 138 MMHG | DIASTOLIC BLOOD PRESSURE: 72 MMHG

## 2020-11-16 VITALS — DIASTOLIC BLOOD PRESSURE: 63 MMHG | SYSTOLIC BLOOD PRESSURE: 159 MMHG

## 2020-11-16 VITALS — DIASTOLIC BLOOD PRESSURE: 62 MMHG | SYSTOLIC BLOOD PRESSURE: 148 MMHG

## 2020-11-16 VITALS — DIASTOLIC BLOOD PRESSURE: 55 MMHG | SYSTOLIC BLOOD PRESSURE: 148 MMHG

## 2020-11-16 VITALS — SYSTOLIC BLOOD PRESSURE: 164 MMHG | DIASTOLIC BLOOD PRESSURE: 63 MMHG

## 2020-11-16 VITALS — DIASTOLIC BLOOD PRESSURE: 66 MMHG | SYSTOLIC BLOOD PRESSURE: 168 MMHG

## 2020-11-16 VITALS — SYSTOLIC BLOOD PRESSURE: 151 MMHG | DIASTOLIC BLOOD PRESSURE: 56 MMHG

## 2020-11-16 VITALS — DIASTOLIC BLOOD PRESSURE: 62 MMHG | SYSTOLIC BLOOD PRESSURE: 157 MMHG

## 2020-11-16 LAB
ALBUMIN SERPL-MCNC: 2.1 G/DL (ref 3.4–5)
ALT SERPL-CCNC: 16 U/L (ref 30–65)
ANION GAP SERPL CALC-SCNC: 8 MMOL/L (ref 7–16)
ANISOCYTOSIS BLD QL SMEAR: (no result)
AST SERPL-CCNC: 13 U/L (ref 15–37)
BASOPHILS NFR BLD AUTO: 0 % (ref 0–2)
BE(VIVO): 6.2 MMOL/L
BILIRUB SERPL-MCNC: 0.4 MG/DL (ref 0.2–1)
BUN SERPL-MCNC: 21 MG/DL (ref 7–18)
CALCIUM SERPL-MCNC: 8.6 MG/DL (ref 8.5–10.1)
CHLORIDE SERPL-SCNC: 105 MMOL/L (ref 98–107)
CO2 SERPL-SCNC: 31 MMOL/L (ref 21–32)
CREAT SERPL-MCNC: 0.7 MG/DL (ref 0.6–1)
EOSINOPHIL NFR BLD: 0 % (ref 0–3)
ERYTHROCYTE [DISTWIDTH] IN BLOOD BY AUTOMATED COUNT: 17.6 % (ref 10.5–14.5)
GLUCOSE SERPL-MCNC: 253 MG/DL (ref 74–106)
GRANULOCYTES NFR BLD MANUAL: 86 % (ref 36–66)
HCO3 BLD-SCNC: 30.1 MMOL/L (ref 22–26)
HCT VFR BLD CALC: 34.7 % (ref 37–47)
HGB BLD-MCNC: 11 GM/DL (ref 12–15)
LG PLATELETS BLD QL SMEAR: (no result)
LYMPHOCYTES NFR BLD AUTO: 5 % (ref 24–44)
MAGNESIUM SERPL-MCNC: 2.2 MG/DL (ref 1.8–2.4)
MCH RBC QN AUTO: 25.4 PG (ref 26–34)
MCHC RBC AUTO-ENTMCNC: 31.8 G/DL (ref 28–37)
MCV RBC: 79.9 FL (ref 80–100)
METAMYELOCYTES NFR BLD: 1 %
MONOCYTES NFR BLD: 2 % (ref 1–8)
MYELOCYTES NFR BLD: 1 %
NEUTROPHILS # BLD: 8.4 THOU/UL (ref 1.4–8.2)
NEUTS BAND NFR BLD: 3 % (ref 0–8)
OVALOCYTES BLD QL SMEAR: (no result)
PCO2 BLD: 40.8 MMHG (ref 35–45)
PHOSPHATE SERPL-MCNC: 3 MG/DL (ref 2.5–4.9)
PLATELET # BLD: 178 THOU/UL (ref 150–400)
PO2 BLD: 69.1 MMHG (ref 80–100)
POTASSIUM SERPL-SCNC: 3.7 MMOL/L (ref 3.5–5.1)
PROT SERPL-MCNC: 5.7 G/DL (ref 6.4–8.2)
RBC # BLD AUTO: 4.35 MIL/UL (ref 4.2–5)
SODIUM SERPL-SCNC: 144 MMOL/L (ref 136–145)
VARIANT LYMPHS NFR BLD MANUAL: 2 %
WBC # BLD AUTO: 9.4 THOU/UL (ref 4–11)

## 2020-11-16 PROCEDURE — 5A09357 ASSISTANCE WITH RESPIRATORY VENTILATION, LESS THAN 24 CONSECUTIVE HOURS, CONTINUOUS POSITIVE AIRWAY PRESSURE: ICD-10-PCS | Performed by: HOSPITALIST

## 2020-11-16 NOTE — NUR
PATIENT WAS ON BIPAP EARLIER AND SWITCHED TO OPTIFLOW BY PORFIRIO MEIER AFTER ABG
OBTAINED. PATIENT DROWSY BUT AWAKENS AND WHEN AWAKE IS ORIENTED TO PERSON AND
PLACE. FORGETFUL AND AT TIMES YELLS OUT AND SOMETIMES GETS TEARFUL AND
FORGETFUL. VITALS STABLE AND HAS DENIED PAIN. PUDDING AND APPLESAUCE PROVIDED
WITH CRUSHED PILLS AND HONEY-THICK LIQUIDS PER S.T. RECOMMENDATION. TPN ALSO
INFUSION. WILL CONTINUE WITH CURRENT POC.

## 2020-11-16 NOTE — NUR
2045- Nurse talked with David, CONSTANTIN, and his wife Karli on the phone, and
updated them on patient status and plan of care. Their questions were
answered. Patients son expressed that Isabelle is not on patients DPOA
paperwork and would like her name removed from the DPOA list. He expressed he
wants his wife Karli to be added to the call list. [facility] will assess this and
update this when paperwork is obtained.

## 2020-11-16 NOTE — NUR
USES THE BIPAP TONIGHT STARTED AT 70% THEN TITRATED TO 60%.TOLERATED CRUSHED
PILLS WITH APPLE SAUCE.ON PRECEDEX GTT.ON TPN.QIU TO DD.POC CONTINUED.

## 2020-11-17 VITALS — SYSTOLIC BLOOD PRESSURE: 151 MMHG | DIASTOLIC BLOOD PRESSURE: 73 MMHG

## 2020-11-17 VITALS — DIASTOLIC BLOOD PRESSURE: 70 MMHG | SYSTOLIC BLOOD PRESSURE: 161 MMHG

## 2020-11-17 VITALS — DIASTOLIC BLOOD PRESSURE: 86 MMHG | SYSTOLIC BLOOD PRESSURE: 151 MMHG

## 2020-11-17 VITALS — SYSTOLIC BLOOD PRESSURE: 186 MMHG | DIASTOLIC BLOOD PRESSURE: 68 MMHG

## 2020-11-17 VITALS — DIASTOLIC BLOOD PRESSURE: 64 MMHG | SYSTOLIC BLOOD PRESSURE: 159 MMHG

## 2020-11-17 VITALS — SYSTOLIC BLOOD PRESSURE: 163 MMHG | DIASTOLIC BLOOD PRESSURE: 61 MMHG

## 2020-11-17 VITALS — DIASTOLIC BLOOD PRESSURE: 63 MMHG | SYSTOLIC BLOOD PRESSURE: 173 MMHG

## 2020-11-17 VITALS — DIASTOLIC BLOOD PRESSURE: 58 MMHG | SYSTOLIC BLOOD PRESSURE: 160 MMHG

## 2020-11-17 VITALS — DIASTOLIC BLOOD PRESSURE: 53 MMHG | SYSTOLIC BLOOD PRESSURE: 206 MMHG

## 2020-11-17 VITALS — DIASTOLIC BLOOD PRESSURE: 49 MMHG | SYSTOLIC BLOOD PRESSURE: 183 MMHG

## 2020-11-17 VITALS — DIASTOLIC BLOOD PRESSURE: 63 MMHG | SYSTOLIC BLOOD PRESSURE: 151 MMHG

## 2020-11-17 VITALS — DIASTOLIC BLOOD PRESSURE: 69 MMHG | SYSTOLIC BLOOD PRESSURE: 159 MMHG

## 2020-11-17 VITALS — SYSTOLIC BLOOD PRESSURE: 143 MMHG | DIASTOLIC BLOOD PRESSURE: 51 MMHG

## 2020-11-17 VITALS — SYSTOLIC BLOOD PRESSURE: 152 MMHG | DIASTOLIC BLOOD PRESSURE: 76 MMHG

## 2020-11-17 VITALS — DIASTOLIC BLOOD PRESSURE: 73 MMHG | SYSTOLIC BLOOD PRESSURE: 155 MMHG

## 2020-11-17 VITALS — SYSTOLIC BLOOD PRESSURE: 141 MMHG | DIASTOLIC BLOOD PRESSURE: 61 MMHG

## 2020-11-17 VITALS — DIASTOLIC BLOOD PRESSURE: 66 MMHG | SYSTOLIC BLOOD PRESSURE: 167 MMHG

## 2020-11-17 VITALS — DIASTOLIC BLOOD PRESSURE: 62 MMHG | SYSTOLIC BLOOD PRESSURE: 169 MMHG

## 2020-11-17 VITALS — DIASTOLIC BLOOD PRESSURE: 60 MMHG | SYSTOLIC BLOOD PRESSURE: 180 MMHG

## 2020-11-17 VITALS — SYSTOLIC BLOOD PRESSURE: 148 MMHG | DIASTOLIC BLOOD PRESSURE: 55 MMHG

## 2020-11-17 VITALS — SYSTOLIC BLOOD PRESSURE: 154 MMHG | DIASTOLIC BLOOD PRESSURE: 57 MMHG

## 2020-11-17 VITALS — DIASTOLIC BLOOD PRESSURE: 68 MMHG | SYSTOLIC BLOOD PRESSURE: 156 MMHG

## 2020-11-17 VITALS — SYSTOLIC BLOOD PRESSURE: 130 MMHG | DIASTOLIC BLOOD PRESSURE: 78 MMHG

## 2020-11-17 VITALS — SYSTOLIC BLOOD PRESSURE: 165 MMHG | DIASTOLIC BLOOD PRESSURE: 66 MMHG

## 2020-11-17 VITALS — SYSTOLIC BLOOD PRESSURE: 140 MMHG | DIASTOLIC BLOOD PRESSURE: 50 MMHG

## 2020-11-17 VITALS — SYSTOLIC BLOOD PRESSURE: 185 MMHG | DIASTOLIC BLOOD PRESSURE: 46 MMHG

## 2020-11-17 LAB
ALBUMIN SERPL-MCNC: 2.1 G/DL (ref 3.4–5)
ALT SERPL-CCNC: 15 U/L (ref 30–65)
ANION GAP SERPL CALC-SCNC: 8 MMOL/L (ref 7–16)
AST SERPL-CCNC: 14 U/L (ref 15–37)
BILIRUB DIRECT SERPL-MCNC: 0.1 MG/DL
BILIRUB SERPL-MCNC: 0.4 MG/DL (ref 0.2–1)
BUN SERPL-MCNC: 26 MG/DL (ref 7–18)
CALCIUM SERPL-MCNC: 8.3 MG/DL (ref 8.5–10.1)
CHLORIDE SERPL-SCNC: 108 MMOL/L (ref 98–107)
CO2 SERPL-SCNC: 30 MMOL/L (ref 21–32)
CREAT SERPL-MCNC: 0.6 MG/DL (ref 0.6–1)
GLUCOSE SERPL-MCNC: 161 MG/DL (ref 74–106)
MAGNESIUM SERPL-MCNC: 2.2 MG/DL (ref 1.8–2.4)
PHOSPHATE SERPL-MCNC: 2.8 MG/DL (ref 2.5–4.9)
POTASSIUM SERPL-SCNC: 3 MMOL/L (ref 3.5–5.1)
PROT SERPL-MCNC: 4.9 G/DL (ref 6.4–8.2)
SODIUM SERPL-SCNC: 146 MMOL/L (ref 136–145)

## 2020-11-17 PROCEDURE — 5A09357 ASSISTANCE WITH RESPIRATORY VENTILATION, LESS THAN 24 CONSECUTIVE HOURS, CONTINUOUS POSITIVE AIRWAY PRESSURE: ICD-10-PCS | Performed by: HOSPITALIST

## 2020-11-17 NOTE — NUR
DAUGHTER IN LAW CALLING ATY 1755 TO
GET AN UPDATE ABOUT PATIENT. DAUGTHER STATING THAT
THE MOTHER IN LAW SENT THE POLICE BACK TO THE HOUSE. BUT THE PATIENT HAS BEEN
RESTING TODAY AND DID NOT HAVE ACCESS TO THE PHONE. DAUGHTER IN LAW STATING
SHOULD THE  WHO DID NOT WEAR A MASK WHEN HELPING HER MOTHER IN
LAW INTO THE HOSPITAL TO GET A COVID 19 TEST. AND INFORMING ME THAT EVERYONE
AT THE HOUSE HAS TESTED NEGATIVE. UPDATE GIVEN TO DAUGHTER IN LAW. ASSESSMENTS
AND INTERVENTIONS AS DOCCUMENTED. PATIENT SLEPT THROUGH OUT THE SHIFT UNABLE
TO TAKE MOST MEDICATION. PATIENT WAKING UP YELLING FOR BOAZ. PATIENT IS NOT
PROGRESSING TOWARDS GOALS AT THIS TIME AS EVIDENCE BY STIL REQUIRING 70%
OXYGEN.

## 2020-11-17 NOTE — NUR
chart review. jono still requiring use of bipap. covid +. cm called and
left message for son mina, cm requested a call back.

## 2020-11-17 NOTE — NUR
Patient not progressing towards plan of care as evidenced by continued need
for optiflow when not on bipap, spit out some of her pills last night and
would not swallow them, confusion continues, she attempts to pull off oxygen
and bipap with release of restraints q2 hours. Education provided frequently
as to the plan of care for her, as she expresses she doesn't know where she
is. She has rested most of the night with intermittent periods of
restlessness. Intermittent cough noted that is non-productive. Plan of care is
to continue to monitor patient vital signs q1 hour, perform assessments q2-4
hours, wean down oxygen as able, and provide reorientation when needed. normal...

## 2020-11-18 VITALS — SYSTOLIC BLOOD PRESSURE: 187 MMHG | DIASTOLIC BLOOD PRESSURE: 65 MMHG

## 2020-11-18 VITALS — SYSTOLIC BLOOD PRESSURE: 155 MMHG | DIASTOLIC BLOOD PRESSURE: 86 MMHG

## 2020-11-18 VITALS — DIASTOLIC BLOOD PRESSURE: 90 MMHG | SYSTOLIC BLOOD PRESSURE: 135 MMHG

## 2020-11-18 VITALS — SYSTOLIC BLOOD PRESSURE: 177 MMHG | DIASTOLIC BLOOD PRESSURE: 72 MMHG

## 2020-11-18 VITALS — DIASTOLIC BLOOD PRESSURE: 65 MMHG | SYSTOLIC BLOOD PRESSURE: 156 MMHG

## 2020-11-18 VITALS — SYSTOLIC BLOOD PRESSURE: 169 MMHG | DIASTOLIC BLOOD PRESSURE: 74 MMHG

## 2020-11-18 VITALS — DIASTOLIC BLOOD PRESSURE: 73 MMHG | SYSTOLIC BLOOD PRESSURE: 160 MMHG

## 2020-11-18 VITALS — SYSTOLIC BLOOD PRESSURE: 173 MMHG | DIASTOLIC BLOOD PRESSURE: 75 MMHG

## 2020-11-18 VITALS — SYSTOLIC BLOOD PRESSURE: 151 MMHG | DIASTOLIC BLOOD PRESSURE: 73 MMHG

## 2020-11-18 VITALS — DIASTOLIC BLOOD PRESSURE: 66 MMHG | SYSTOLIC BLOOD PRESSURE: 171 MMHG

## 2020-11-18 VITALS — DIASTOLIC BLOOD PRESSURE: 61 MMHG | SYSTOLIC BLOOD PRESSURE: 161 MMHG

## 2020-11-18 VITALS — SYSTOLIC BLOOD PRESSURE: 168 MMHG | DIASTOLIC BLOOD PRESSURE: 66 MMHG

## 2020-11-18 VITALS — DIASTOLIC BLOOD PRESSURE: 83 MMHG | SYSTOLIC BLOOD PRESSURE: 145 MMHG

## 2020-11-18 VITALS — DIASTOLIC BLOOD PRESSURE: 75 MMHG | SYSTOLIC BLOOD PRESSURE: 159 MMHG

## 2020-11-18 VITALS — SYSTOLIC BLOOD PRESSURE: 171 MMHG | DIASTOLIC BLOOD PRESSURE: 70 MMHG

## 2020-11-18 VITALS — SYSTOLIC BLOOD PRESSURE: 143 MMHG | DIASTOLIC BLOOD PRESSURE: 66 MMHG

## 2020-11-18 VITALS — SYSTOLIC BLOOD PRESSURE: 153 MMHG | DIASTOLIC BLOOD PRESSURE: 97 MMHG

## 2020-11-18 VITALS — SYSTOLIC BLOOD PRESSURE: 166 MMHG | DIASTOLIC BLOOD PRESSURE: 88 MMHG

## 2020-11-18 VITALS — SYSTOLIC BLOOD PRESSURE: 165 MMHG | DIASTOLIC BLOOD PRESSURE: 87 MMHG

## 2020-11-18 VITALS — DIASTOLIC BLOOD PRESSURE: 52 MMHG | SYSTOLIC BLOOD PRESSURE: 132 MMHG

## 2020-11-18 VITALS — SYSTOLIC BLOOD PRESSURE: 165 MMHG | DIASTOLIC BLOOD PRESSURE: 68 MMHG

## 2020-11-18 VITALS — DIASTOLIC BLOOD PRESSURE: 64 MMHG | SYSTOLIC BLOOD PRESSURE: 172 MMHG

## 2020-11-18 VITALS — DIASTOLIC BLOOD PRESSURE: 71 MMHG | SYSTOLIC BLOOD PRESSURE: 176 MMHG

## 2020-11-18 VITALS — DIASTOLIC BLOOD PRESSURE: 89 MMHG | SYSTOLIC BLOOD PRESSURE: 170 MMHG

## 2020-11-18 VITALS — SYSTOLIC BLOOD PRESSURE: 152 MMHG | DIASTOLIC BLOOD PRESSURE: 62 MMHG

## 2020-11-18 VITALS — DIASTOLIC BLOOD PRESSURE: 59 MMHG | SYSTOLIC BLOOD PRESSURE: 152 MMHG

## 2020-11-18 LAB
ALBUMIN SERPL-MCNC: 1.8 G/DL (ref 3.4–5)
ALT SERPL-CCNC: 14 U/L (ref 30–65)
ANION GAP SERPL CALC-SCNC: 4 MMOL/L (ref 7–16)
AST SERPL-CCNC: 12 U/L (ref 15–37)
BILIRUB DIRECT SERPL-MCNC: 0.1 MG/DL
BILIRUB SERPL-MCNC: 0.4 MG/DL (ref 0.2–1)
BUN SERPL-MCNC: 26 MG/DL (ref 7–18)
CALCIUM SERPL-MCNC: 8.1 MG/DL (ref 8.5–10.1)
CHLORIDE SERPL-SCNC: 108 MMOL/L (ref 98–107)
CO2 SERPL-SCNC: 32 MMOL/L (ref 21–32)
CREAT SERPL-MCNC: 0.6 MG/DL (ref 0.6–1)
GLUCOSE SERPL-MCNC: 211 MG/DL (ref 74–106)
PHOSPHATE SERPL-MCNC: 3.1 MG/DL (ref 2.5–4.9)
POTASSIUM SERPL-SCNC: 4.1 MMOL/L (ref 3.5–5.1)
PROT SERPL-MCNC: 5 G/DL (ref 6.4–8.2)
SODIUM SERPL-SCNC: 144 MMOL/L (ref 136–145)

## 2020-11-18 PROCEDURE — 5A09357 ASSISTANCE WITH RESPIRATORY VENTILATION, LESS THAN 24 CONSECUTIVE HOURS, CONTINUOUS POSITIVE AIRWAY PRESSURE: ICD-10-PCS | Performed by: HOSPITALIST

## 2020-11-18 NOTE — NUR
PATIENT CONTINUES ON BIPAP. NOT PROGRESSING TOWARDS GOALS. ATTEMPTED OTPIFLOW
PATIENT STATS 88% PRECEDEX GTT.

## 2020-11-18 NOTE — NUR
Patient not progressing towards plan of care as evidenced by continued high
flow requirements of oxygen and continued confusion. She pulls off oxygen if
gets a chance. Re-education provided as needed. She has been intermittent with
taking her pills or keeping them in her mouth. This was informed to Dr. Nguyen
who ordered for nurse to place a dobhoff. Two RN's attempted and were unable
to place it due to the patient fighting the process and thrashing. Patient did
end up swallowing this pills after this. No choking or spitting noted.

## 2020-11-19 VITALS — SYSTOLIC BLOOD PRESSURE: 137 MMHG | DIASTOLIC BLOOD PRESSURE: 53 MMHG

## 2020-11-19 VITALS — SYSTOLIC BLOOD PRESSURE: 187 MMHG | DIASTOLIC BLOOD PRESSURE: 76 MMHG

## 2020-11-19 VITALS — DIASTOLIC BLOOD PRESSURE: 65 MMHG | SYSTOLIC BLOOD PRESSURE: 181 MMHG

## 2020-11-19 VITALS — SYSTOLIC BLOOD PRESSURE: 195 MMHG | DIASTOLIC BLOOD PRESSURE: 79 MMHG

## 2020-11-19 VITALS — SYSTOLIC BLOOD PRESSURE: 146 MMHG | DIASTOLIC BLOOD PRESSURE: 75 MMHG

## 2020-11-19 VITALS — SYSTOLIC BLOOD PRESSURE: 143 MMHG | DIASTOLIC BLOOD PRESSURE: 106 MMHG

## 2020-11-19 VITALS — DIASTOLIC BLOOD PRESSURE: 62 MMHG | SYSTOLIC BLOOD PRESSURE: 174 MMHG

## 2020-11-19 VITALS — DIASTOLIC BLOOD PRESSURE: 104 MMHG | SYSTOLIC BLOOD PRESSURE: 138 MMHG

## 2020-11-19 VITALS — SYSTOLIC BLOOD PRESSURE: 163 MMHG | DIASTOLIC BLOOD PRESSURE: 94 MMHG

## 2020-11-19 VITALS — SYSTOLIC BLOOD PRESSURE: 131 MMHG | DIASTOLIC BLOOD PRESSURE: 59 MMHG

## 2020-11-19 VITALS — SYSTOLIC BLOOD PRESSURE: 138 MMHG | DIASTOLIC BLOOD PRESSURE: 101 MMHG

## 2020-11-19 VITALS — SYSTOLIC BLOOD PRESSURE: 149 MMHG | DIASTOLIC BLOOD PRESSURE: 78 MMHG

## 2020-11-19 VITALS — DIASTOLIC BLOOD PRESSURE: 91 MMHG | SYSTOLIC BLOOD PRESSURE: 198 MMHG

## 2020-11-19 VITALS — DIASTOLIC BLOOD PRESSURE: 50 MMHG | SYSTOLIC BLOOD PRESSURE: 130 MMHG

## 2020-11-19 VITALS — DIASTOLIC BLOOD PRESSURE: 53 MMHG | SYSTOLIC BLOOD PRESSURE: 141 MMHG

## 2020-11-19 VITALS — SYSTOLIC BLOOD PRESSURE: 140 MMHG | DIASTOLIC BLOOD PRESSURE: 54 MMHG

## 2020-11-19 VITALS — DIASTOLIC BLOOD PRESSURE: 54 MMHG | SYSTOLIC BLOOD PRESSURE: 141 MMHG

## 2020-11-19 VITALS — SYSTOLIC BLOOD PRESSURE: 178 MMHG | DIASTOLIC BLOOD PRESSURE: 60 MMHG

## 2020-11-19 VITALS — SYSTOLIC BLOOD PRESSURE: 155 MMHG | DIASTOLIC BLOOD PRESSURE: 52 MMHG

## 2020-11-19 VITALS — DIASTOLIC BLOOD PRESSURE: 78 MMHG | SYSTOLIC BLOOD PRESSURE: 132 MMHG

## 2020-11-19 VITALS — SYSTOLIC BLOOD PRESSURE: 153 MMHG | DIASTOLIC BLOOD PRESSURE: 58 MMHG

## 2020-11-19 VITALS — DIASTOLIC BLOOD PRESSURE: 59 MMHG | SYSTOLIC BLOOD PRESSURE: 154 MMHG

## 2020-11-19 VITALS — DIASTOLIC BLOOD PRESSURE: 58 MMHG | SYSTOLIC BLOOD PRESSURE: 153 MMHG

## 2020-11-19 VITALS — DIASTOLIC BLOOD PRESSURE: 71 MMHG | SYSTOLIC BLOOD PRESSURE: 197 MMHG

## 2020-11-19 VITALS — DIASTOLIC BLOOD PRESSURE: 84 MMHG | SYSTOLIC BLOOD PRESSURE: 153 MMHG

## 2020-11-19 VITALS — SYSTOLIC BLOOD PRESSURE: 137 MMHG | DIASTOLIC BLOOD PRESSURE: 55 MMHG

## 2020-11-19 VITALS — SYSTOLIC BLOOD PRESSURE: 197 MMHG | DIASTOLIC BLOOD PRESSURE: 83 MMHG

## 2020-11-19 VITALS — SYSTOLIC BLOOD PRESSURE: 198 MMHG | DIASTOLIC BLOOD PRESSURE: 69 MMHG

## 2020-11-19 LAB
ALBUMIN SERPL-MCNC: 1.8 G/DL (ref 3.4–5)
ALT SERPL-CCNC: 16 U/L (ref 30–65)
ANION GAP SERPL CALC-SCNC: 5 MMOL/L (ref 7–16)
AST SERPL-CCNC: 14 U/L (ref 15–37)
BE(VIVO): 4.3 MMOL/L
BILIRUB DIRECT SERPL-MCNC: 0.1 MG/DL
BILIRUB SERPL-MCNC: 0.3 MG/DL (ref 0.2–1)
BUN SERPL-MCNC: 29 MG/DL (ref 7–18)
CALCIUM SERPL-MCNC: 7.9 MG/DL (ref 8.5–10.1)
CHLORIDE SERPL-SCNC: 104 MMOL/L (ref 98–107)
CO2 SERPL-SCNC: 33 MMOL/L (ref 21–32)
CREAT SERPL-MCNC: 0.7 MG/DL (ref 0.6–1)
GLUCOSE SERPL-MCNC: 359 MG/DL (ref 74–106)
HCO3 BLD-SCNC: 27.7 MMOL/L (ref 22–26)
PCO2 BLD: 37.2 MMHG (ref 35–45)
PO2 BLD: 67.2 MMHG (ref 80–100)
POTASSIUM SERPL-SCNC: 3.4 MMOL/L (ref 3.5–5.1)
PROT SERPL-MCNC: 5 G/DL (ref 6.4–8.2)
SODIUM SERPL-SCNC: 142 MMOL/L (ref 136–145)

## 2020-11-19 PROCEDURE — 5A09357 ASSISTANCE WITH RESPIRATORY VENTILATION, LESS THAN 24 CONSECUTIVE HOURS, CONTINUOUS POSITIVE AIRWAY PRESSURE: ICD-10-PCS | Performed by: HOSPITALIST

## 2020-11-19 NOTE — NUR
PT RESTLESS AT THE BEGINNING OF THE SHIFT YESTERDAY. CONFUSED AND ORIENTED TO
SELF.  VITAL STABLE.  PT WAS NOT IN ANY APPARENT PAIN.  PT ALSO WAS ABLE TO
TAKE HER ORAL MEDICATIONS AT 2100, BUT OVERNIGHT SHE SEEMED TO SLEEPY/SEDATED
UNABLE TO TAKE ANYTHING ORAL.  NO OTHER CONCERNS.  FAMILY UPDATED ABOUT PT
STATUS.  WILL CONTINUE TO MONITOR AND FOLLOW POC.

## 2020-11-19 NOTE — NUR
PATIENT CONTINUES ON BIPAP DURING THE DAY. AT 1600 PATIENT SWITCHED TO
OPTIFLOW 79%, TOLERATING WELL. AROUND 1700 PATIENT BECAME RESTLESS, TEARFUL
AND ANXIOUS. PRECEDEX GTT REINITIATED. CURRENTLY AT 0.04MCG PRECEDEX. SENT
PURSE HOME WITH CONSTANTIN NOONAN. ABG DONE TODAY. BIPAP SETTINGS CHANGED 14/8.
REPLACE KCL 20MEQ. LASIX GIVEN.

## 2020-11-20 VITALS — DIASTOLIC BLOOD PRESSURE: 77 MMHG | SYSTOLIC BLOOD PRESSURE: 161 MMHG

## 2020-11-20 VITALS — SYSTOLIC BLOOD PRESSURE: 134 MMHG | DIASTOLIC BLOOD PRESSURE: 55 MMHG

## 2020-11-20 VITALS — SYSTOLIC BLOOD PRESSURE: 142 MMHG | DIASTOLIC BLOOD PRESSURE: 55 MMHG

## 2020-11-20 VITALS — SYSTOLIC BLOOD PRESSURE: 144 MMHG | DIASTOLIC BLOOD PRESSURE: 77 MMHG

## 2020-11-20 VITALS — DIASTOLIC BLOOD PRESSURE: 92 MMHG | SYSTOLIC BLOOD PRESSURE: 166 MMHG

## 2020-11-20 VITALS — DIASTOLIC BLOOD PRESSURE: 57 MMHG | SYSTOLIC BLOOD PRESSURE: 147 MMHG

## 2020-11-20 VITALS — SYSTOLIC BLOOD PRESSURE: 156 MMHG | DIASTOLIC BLOOD PRESSURE: 65 MMHG

## 2020-11-20 VITALS — DIASTOLIC BLOOD PRESSURE: 53 MMHG | SYSTOLIC BLOOD PRESSURE: 150 MMHG

## 2020-11-20 VITALS — SYSTOLIC BLOOD PRESSURE: 113 MMHG | DIASTOLIC BLOOD PRESSURE: 95 MMHG

## 2020-11-20 VITALS — DIASTOLIC BLOOD PRESSURE: 55 MMHG | SYSTOLIC BLOOD PRESSURE: 143 MMHG

## 2020-11-20 VITALS — SYSTOLIC BLOOD PRESSURE: 154 MMHG | DIASTOLIC BLOOD PRESSURE: 81 MMHG

## 2020-11-20 VITALS — DIASTOLIC BLOOD PRESSURE: 84 MMHG | SYSTOLIC BLOOD PRESSURE: 168 MMHG

## 2020-11-20 VITALS — DIASTOLIC BLOOD PRESSURE: 58 MMHG | SYSTOLIC BLOOD PRESSURE: 160 MMHG

## 2020-11-20 VITALS — SYSTOLIC BLOOD PRESSURE: 139 MMHG | DIASTOLIC BLOOD PRESSURE: 86 MMHG

## 2020-11-20 VITALS — DIASTOLIC BLOOD PRESSURE: 67 MMHG | SYSTOLIC BLOOD PRESSURE: 163 MMHG

## 2020-11-20 VITALS — SYSTOLIC BLOOD PRESSURE: 155 MMHG | DIASTOLIC BLOOD PRESSURE: 53 MMHG

## 2020-11-20 VITALS — DIASTOLIC BLOOD PRESSURE: 61 MMHG | SYSTOLIC BLOOD PRESSURE: 146 MMHG

## 2020-11-20 VITALS — SYSTOLIC BLOOD PRESSURE: 162 MMHG | DIASTOLIC BLOOD PRESSURE: 52 MMHG

## 2020-11-20 VITALS — DIASTOLIC BLOOD PRESSURE: 74 MMHG | SYSTOLIC BLOOD PRESSURE: 173 MMHG

## 2020-11-20 VITALS — DIASTOLIC BLOOD PRESSURE: 54 MMHG | SYSTOLIC BLOOD PRESSURE: 149 MMHG

## 2020-11-20 LAB
ALBUMIN SERPL-MCNC: 1.9 G/DL (ref 3.4–5)
ALT SERPL-CCNC: 17 U/L (ref 30–65)
ANION GAP SERPL CALC-SCNC: 5 MMOL/L (ref 7–16)
ANISOCYTOSIS BLD QL SMEAR: SLIGHT
AST SERPL-CCNC: 16 U/L (ref 15–37)
BASOPHILS NFR BLD AUTO: 0 % (ref 0–2)
BE(VIVO): 2.9 MMOL/L
BE(VIVO): 9.1 MMOL/L
BILIRUB SERPL-MCNC: 0.4 MG/DL (ref 0.2–1)
BUN SERPL-MCNC: 29 MG/DL (ref 7–18)
CALCIUM SERPL-MCNC: 8.1 MG/DL (ref 8.5–10.1)
CHLORIDE SERPL-SCNC: 104 MMOL/L (ref 98–107)
CO2 SERPL-SCNC: 33 MMOL/L (ref 21–32)
CREAT SERPL-MCNC: 0.6 MG/DL (ref 0.6–1)
EOSINOPHIL NFR BLD: 0 % (ref 0–3)
ERYTHROCYTE [DISTWIDTH] IN BLOOD BY AUTOMATED COUNT: 17.1 % (ref 10.5–14.5)
GLUCOSE SERPL-MCNC: 242 MG/DL (ref 74–106)
GRANULOCYTES NFR BLD MANUAL: 90 % (ref 36–66)
HCO3 BLD-SCNC: 26.2 MMOL/L (ref 22–26)
HCO3 BLD-SCNC: 33.4 MMOL/L (ref 22–26)
HCT VFR BLD CALC: 31.6 % (ref 37–47)
HGB BLD-MCNC: 10 GM/DL (ref 12–15)
LG PLATELETS BLD QL SMEAR: (no result)
LYMPHOCYTES NFR BLD AUTO: 5 % (ref 24–44)
MAGNESIUM SERPL-MCNC: 2.4 MG/DL (ref 1.8–2.4)
MCH RBC QN AUTO: 25.5 PG (ref 26–34)
MCHC RBC AUTO-ENTMCNC: 31.8 G/DL (ref 28–37)
MCV RBC: 80.2 FL (ref 80–100)
METAMYELOCYTES NFR BLD: 2 %
MONOCYTES NFR BLD: 1 % (ref 1–8)
MYELOCYTES NFR BLD: 1 %
NEUTROPHILS # BLD: 7.2 THOU/UL (ref 1.4–8.2)
NEUTS BAND NFR BLD: 0 % (ref 0–8)
PCO2 BLD: 35.8 MMHG (ref 35–45)
PCO2 BLD: 45 MMHG (ref 35–45)
PHOSPHATE SERPL-MCNC: 3.7 MG/DL (ref 2.5–4.9)
PLATELET # BLD: 175 THOU/UL (ref 150–400)
PO2 BLD: 55 MMHG (ref 80–100)
PO2 BLD: 71.9 MMHG (ref 80–100)
POIKILOCYTOSIS BLD QL SMEAR: SLIGHT
POTASSIUM SERPL-SCNC: 3.7 MMOL/L (ref 3.5–5.1)
PROT SERPL-MCNC: 5.1 G/DL (ref 6.4–8.2)
RBC # BLD AUTO: 3.93 MIL/UL (ref 4.2–5)
SODIUM SERPL-SCNC: 142 MMOL/L (ref 136–145)
VARIANT LYMPHS NFR BLD MANUAL: 1 %
WBC # BLD AUTO: 8 THOU/UL (ref 4–11)

## 2020-11-20 PROCEDURE — 5A09357 ASSISTANCE WITH RESPIRATORY VENTILATION, LESS THAN 24 CONSECUTIVE HOURS, CONTINUOUS POSITIVE AIRWAY PRESSURE: ICD-10-PCS | Performed by: HOSPITALIST

## 2020-11-20 NOTE — NUR
chart review. she cont to require use of bipap and hf o2. noted that she
has some restless and agitation at time. has nutritional support. cm visited
with son mina via phone call. no question or needs voiced. will cont
following as needed for dc needs.

## 2020-11-20 NOTE — NUR
PATIENT HAD A FAIRLY GOOD NIGHT LAST NIGHT. PATIENT RESTED WELL, VSS. PATIENT
DID HAVE SOME HIGH BLOOD PRESSURE WHICH WAS TREATED WITH SOME LOPRESSOR AND
LABATELOL. PATIENT HAD A CRITICALLY LOW PO2 THIS MORNING, DOCTOR JADEN MADE
AWARE OF THIS AT 0445, ORDERS RECIEVED.

## 2020-11-21 VITALS — DIASTOLIC BLOOD PRESSURE: 53 MMHG | SYSTOLIC BLOOD PRESSURE: 100 MMHG

## 2020-11-21 VITALS — DIASTOLIC BLOOD PRESSURE: 55 MMHG | SYSTOLIC BLOOD PRESSURE: 139 MMHG

## 2020-11-21 VITALS — SYSTOLIC BLOOD PRESSURE: 129 MMHG | DIASTOLIC BLOOD PRESSURE: 63 MMHG

## 2020-11-21 VITALS — SYSTOLIC BLOOD PRESSURE: 128 MMHG | DIASTOLIC BLOOD PRESSURE: 58 MMHG

## 2020-11-21 VITALS — DIASTOLIC BLOOD PRESSURE: 62 MMHG | SYSTOLIC BLOOD PRESSURE: 148 MMHG

## 2020-11-21 VITALS — DIASTOLIC BLOOD PRESSURE: 65 MMHG | SYSTOLIC BLOOD PRESSURE: 149 MMHG

## 2020-11-21 VITALS — DIASTOLIC BLOOD PRESSURE: 43 MMHG | SYSTOLIC BLOOD PRESSURE: 114 MMHG

## 2020-11-21 VITALS — DIASTOLIC BLOOD PRESSURE: 45 MMHG | SYSTOLIC BLOOD PRESSURE: 118 MMHG

## 2020-11-21 VITALS — SYSTOLIC BLOOD PRESSURE: 135 MMHG | DIASTOLIC BLOOD PRESSURE: 101 MMHG

## 2020-11-21 VITALS — DIASTOLIC BLOOD PRESSURE: 52 MMHG | SYSTOLIC BLOOD PRESSURE: 112 MMHG

## 2020-11-21 VITALS — SYSTOLIC BLOOD PRESSURE: 142 MMHG | DIASTOLIC BLOOD PRESSURE: 53 MMHG

## 2020-11-21 VITALS — DIASTOLIC BLOOD PRESSURE: 47 MMHG | SYSTOLIC BLOOD PRESSURE: 137 MMHG

## 2020-11-21 VITALS — DIASTOLIC BLOOD PRESSURE: 66 MMHG | SYSTOLIC BLOOD PRESSURE: 149 MMHG

## 2020-11-21 VITALS — DIASTOLIC BLOOD PRESSURE: 85 MMHG | SYSTOLIC BLOOD PRESSURE: 145 MMHG

## 2020-11-21 VITALS — SYSTOLIC BLOOD PRESSURE: 125 MMHG | DIASTOLIC BLOOD PRESSURE: 56 MMHG

## 2020-11-21 VITALS — DIASTOLIC BLOOD PRESSURE: 43 MMHG | SYSTOLIC BLOOD PRESSURE: 103 MMHG

## 2020-11-21 VITALS — SYSTOLIC BLOOD PRESSURE: 125 MMHG | DIASTOLIC BLOOD PRESSURE: 59 MMHG

## 2020-11-21 VITALS — DIASTOLIC BLOOD PRESSURE: 52 MMHG | SYSTOLIC BLOOD PRESSURE: 124 MMHG

## 2020-11-21 VITALS — SYSTOLIC BLOOD PRESSURE: 190 MMHG | DIASTOLIC BLOOD PRESSURE: 81 MMHG

## 2020-11-21 VITALS — SYSTOLIC BLOOD PRESSURE: 145 MMHG | DIASTOLIC BLOOD PRESSURE: 54 MMHG

## 2020-11-21 VITALS — SYSTOLIC BLOOD PRESSURE: 120 MMHG | DIASTOLIC BLOOD PRESSURE: 45 MMHG

## 2020-11-21 VITALS — DIASTOLIC BLOOD PRESSURE: 55 MMHG | SYSTOLIC BLOOD PRESSURE: 148 MMHG

## 2020-11-21 VITALS — SYSTOLIC BLOOD PRESSURE: 136 MMHG | DIASTOLIC BLOOD PRESSURE: 51 MMHG

## 2020-11-21 VITALS — DIASTOLIC BLOOD PRESSURE: 59 MMHG | SYSTOLIC BLOOD PRESSURE: 126 MMHG

## 2020-11-21 VITALS — SYSTOLIC BLOOD PRESSURE: 140 MMHG | DIASTOLIC BLOOD PRESSURE: 54 MMHG

## 2020-11-21 LAB
ANION GAP SERPL CALC-SCNC: 3 MMOL/L (ref 7–16)
BE(VIVO): 11.8 MMOL/L
BE(VIVO): 7 MMOL/L
BUN SERPL-MCNC: 35 MG/DL (ref 7–18)
CALCIUM SERPL-MCNC: 8.7 MG/DL (ref 8.5–10.1)
CHLORIDE SERPL-SCNC: 104 MMOL/L (ref 98–107)
CO2 SERPL-SCNC: 36 MMOL/L (ref 21–32)
CREAT SERPL-MCNC: 0.8 MG/DL (ref 0.6–1)
GLUCOSE SERPL-MCNC: 208 MG/DL (ref 74–106)
HCO3 BLD-SCNC: 32.3 MMOL/L (ref 22–26)
HCO3 BLD-SCNC: 36.3 MMOL/L (ref 22–26)
MAGNESIUM SERPL-MCNC: 2.3 MG/DL (ref 1.8–2.4)
PCO2 BLD: 46.9 MMHG (ref 35–45)
PCO2 BLD: 49.6 MMHG (ref 35–45)
PHOSPHATE SERPL-MCNC: 3.3 MG/DL (ref 2.5–4.9)
PO2 BLD: 101.2 MMHG (ref 80–100)
PO2 BLD: 57.5 MMHG (ref 80–100)
POTASSIUM SERPL-SCNC: 3.2 MMOL/L (ref 3.5–5.1)
SODIUM SERPL-SCNC: 143 MMOL/L (ref 136–145)

## 2020-11-21 PROCEDURE — 5A1955Z RESPIRATORY VENTILATION, GREATER THAN 96 CONSECUTIVE HOURS: ICD-10-PCS | Performed by: HOSPITALIST

## 2020-11-21 PROCEDURE — 0BH18EZ INSERTION OF ENDOTRACHEAL AIRWAY INTO TRACHEA, VIA NATURAL OR ARTIFICIAL OPENING ENDOSCOPIC: ICD-10-PCS | Performed by: INTERNAL MEDICINE

## 2020-11-21 PROCEDURE — 5A09357 ASSISTANCE WITH RESPIRATORY VENTILATION, LESS THAN 24 CONSECUTIVE HOURS, CONTINUOUS POSITIVE AIRWAY PRESSURE: ICD-10-PCS

## 2020-11-21 NOTE — NUR
ASSESSMENTS AS CHARTED, MEDS CHARTED AS GIVEN. PATIENT RESTING IN BED DURING
SHIFT. STARTED ON OPTI PIA THEN MOVED TO BIPAP FOR THE NIGHT. PATIENT
RECEIVING PRESEDEX AND TPN DURING SHIFT. Q6 ACCU CHECKS ON HIGH DOSE SCALE WAS
331 AT MIDNIGHT GOT 20 UNITS. TURNED AS CHARTED. PATIENT IN RESTRAINTS DURING
SHIFT TO KEEP OXYGEN MASK AND MEDICAL EQUIPMENT IN PLACE. FALL PRECAUTIONS IN
PLACE DURING SHIFT.

## 2020-11-21 NOTE — NUR
PATIENT WAS ON BIPAP AND DID NOT TOLERATE OPTIFLO. DR. STANLEY TALKED TO FAMILY
ON THE PHONE AND PATIENT GOT INTUBATED THIS AFTERNOON. OGT PLACED AND XRAY
COMPLETED. ASSESSMENT AS COMPLETED. WILL CONTINUE WITH POC

## 2020-11-22 VITALS — DIASTOLIC BLOOD PRESSURE: 38 MMHG | SYSTOLIC BLOOD PRESSURE: 140 MMHG

## 2020-11-22 VITALS — DIASTOLIC BLOOD PRESSURE: 43 MMHG | SYSTOLIC BLOOD PRESSURE: 116 MMHG

## 2020-11-22 VITALS — SYSTOLIC BLOOD PRESSURE: 119 MMHG | DIASTOLIC BLOOD PRESSURE: 43 MMHG

## 2020-11-22 VITALS — SYSTOLIC BLOOD PRESSURE: 115 MMHG | DIASTOLIC BLOOD PRESSURE: 43 MMHG

## 2020-11-22 VITALS — DIASTOLIC BLOOD PRESSURE: 44 MMHG | SYSTOLIC BLOOD PRESSURE: 100 MMHG

## 2020-11-22 VITALS — SYSTOLIC BLOOD PRESSURE: 163 MMHG | DIASTOLIC BLOOD PRESSURE: 77 MMHG

## 2020-11-22 VITALS — SYSTOLIC BLOOD PRESSURE: 128 MMHG | DIASTOLIC BLOOD PRESSURE: 56 MMHG

## 2020-11-22 VITALS — DIASTOLIC BLOOD PRESSURE: 47 MMHG | SYSTOLIC BLOOD PRESSURE: 116 MMHG

## 2020-11-22 VITALS — SYSTOLIC BLOOD PRESSURE: 117 MMHG | DIASTOLIC BLOOD PRESSURE: 48 MMHG

## 2020-11-22 VITALS — SYSTOLIC BLOOD PRESSURE: 124 MMHG | DIASTOLIC BLOOD PRESSURE: 49 MMHG

## 2020-11-22 VITALS — DIASTOLIC BLOOD PRESSURE: 59 MMHG | SYSTOLIC BLOOD PRESSURE: 132 MMHG

## 2020-11-22 VITALS — DIASTOLIC BLOOD PRESSURE: 40 MMHG | SYSTOLIC BLOOD PRESSURE: 142 MMHG

## 2020-11-22 VITALS — SYSTOLIC BLOOD PRESSURE: 148 MMHG | DIASTOLIC BLOOD PRESSURE: 58 MMHG

## 2020-11-22 VITALS — SYSTOLIC BLOOD PRESSURE: 123 MMHG | DIASTOLIC BLOOD PRESSURE: 57 MMHG

## 2020-11-22 VITALS — SYSTOLIC BLOOD PRESSURE: 104 MMHG | DIASTOLIC BLOOD PRESSURE: 37 MMHG

## 2020-11-22 VITALS — DIASTOLIC BLOOD PRESSURE: 60 MMHG | SYSTOLIC BLOOD PRESSURE: 125 MMHG

## 2020-11-22 VITALS — SYSTOLIC BLOOD PRESSURE: 108 MMHG | DIASTOLIC BLOOD PRESSURE: 39 MMHG

## 2020-11-22 VITALS — DIASTOLIC BLOOD PRESSURE: 58 MMHG | SYSTOLIC BLOOD PRESSURE: 136 MMHG

## 2020-11-22 VITALS — DIASTOLIC BLOOD PRESSURE: 43 MMHG | SYSTOLIC BLOOD PRESSURE: 122 MMHG

## 2020-11-22 VITALS — SYSTOLIC BLOOD PRESSURE: 117 MMHG | DIASTOLIC BLOOD PRESSURE: 55 MMHG

## 2020-11-22 VITALS — SYSTOLIC BLOOD PRESSURE: 120 MMHG | DIASTOLIC BLOOD PRESSURE: 45 MMHG

## 2020-11-22 VITALS — DIASTOLIC BLOOD PRESSURE: 63 MMHG | SYSTOLIC BLOOD PRESSURE: 135 MMHG

## 2020-11-22 VITALS — SYSTOLIC BLOOD PRESSURE: 126 MMHG | DIASTOLIC BLOOD PRESSURE: 36 MMHG

## 2020-11-22 VITALS — SYSTOLIC BLOOD PRESSURE: 127 MMHG | DIASTOLIC BLOOD PRESSURE: 56 MMHG

## 2020-11-22 LAB
ANION GAP SERPL CALC-SCNC: 4 MMOL/L (ref 7–16)
ANISOCYTOSIS BLD QL SMEAR: (no result)
BE(VIVO): 9.6 MMOL/L
BUN SERPL-MCNC: 38 MG/DL (ref 7–18)
CALCIUM SERPL-MCNC: 8.7 MG/DL (ref 8.5–10.1)
CHLORIDE SERPL-SCNC: 104 MMOL/L (ref 98–107)
CO2 SERPL-SCNC: 36 MMOL/L (ref 21–32)
CREAT SERPL-MCNC: 0.8 MG/DL (ref 0.6–1)
EOSINOPHIL NFR BLD: 0 % (ref 0–3)
ERYTHROCYTE [DISTWIDTH] IN BLOOD BY AUTOMATED COUNT: 17.6 % (ref 10.5–14.5)
GLUCOSE SERPL-MCNC: 164 MG/DL (ref 74–106)
GRANULOCYTES NFR BLD MANUAL: 84 % (ref 36–66)
HCO3 BLD-SCNC: 35.1 MMOL/L (ref 22–26)
HCT VFR BLD CALC: 32.6 % (ref 37–47)
HGB BLD-MCNC: 10.1 GM/DL (ref 12–15)
LG PLATELETS BLD QL SMEAR: (no result)
LYMPHOCYTES NFR BLD AUTO: 4 % (ref 24–44)
MCH RBC QN AUTO: 25.3 PG (ref 26–34)
MCHC RBC AUTO-ENTMCNC: 31.1 G/DL (ref 28–37)
MCV RBC: 81.5 FL (ref 80–100)
METAMYELOCYTES NFR BLD: 3 %
MONOCYTES NFR BLD: 6 % (ref 1–8)
MYELOCYTES NFR BLD: 2 %
NEUTROPHILS # BLD: 6.5 THOU/UL (ref 1.4–8.2)
NEUTS BAND NFR BLD: 1 % (ref 0–8)
PCO2 BLD: 52.2 MMHG (ref 35–45)
PLATELET # BLD: 137 THOU/UL (ref 150–400)
PO2 BLD: 141.6 MMHG (ref 80–100)
POTASSIUM SERPL-SCNC: 3.4 MMOL/L (ref 3.5–5.1)
RBC # BLD AUTO: 4 MIL/UL (ref 4.2–5)
SODIUM SERPL-SCNC: 144 MMOL/L (ref 136–145)
WBC # BLD AUTO: 7.6 THOU/UL (ref 4–11)

## 2020-11-22 PROCEDURE — 0B9D8ZX DRAINAGE OF RIGHT MIDDLE LUNG LOBE, VIA NATURAL OR ARTIFICIAL OPENING ENDOSCOPIC, DIAGNOSTIC: ICD-10-PCS | Performed by: INTERNAL MEDICINE

## 2020-11-22 NOTE — NUR
ASSESSMENTS AS CHARTED, MEDS CHARTED AS GIVEN. PATIENT RESTING IN BED DURING
SHIFT. INTUBATED YESTERDAY, ON PRECEDEX AND PROPOFOL THROUGHOUT SHIFT. TPN WAS
DC'D, TUBE FEED STARTED, WAS NOT OBSORBED WELL, HIGH RESIDUAL, HELD TUBE FEED
UNTIL ABSORBED. VENT SETTINGS WERE NOT CHANGED DURING SHIFT. RESTRAINTS IN
PLACE. FALL PRECAUTIONS IN PLACE.

## 2020-11-22 NOTE — NUR
ASSUMED CARE OF PATIENT AT 1900. VSS, REMAINS % FIO2. BLOOD AND BLOOD
CLOTS NOTED IN ET TUBE WHEN SUCTIONING. SEDATION TITRATED AS CHARTED. NOT
PROGRESSING TOWARDS POC GOALS AT THIS TIME.

## 2020-11-22 NOTE — NUR
ON THE VENT, SEDATION TITRATED DOWN AS TOLERATED. VITAL STABLE, CONTINUES TO
BE ON FIO2 % SO NO WEANING TRIAL. OGT WITH MINIMAL TF DUE TO HIGH
RESIDUALS. ASSESSMENT AS DOCUMENTED. WILL CONTINUE WITH POC. NO PHONE CALLS
RECEIVED FROM FAMILY AT THIS TIME.

## 2020-11-23 VITALS — DIASTOLIC BLOOD PRESSURE: 42 MMHG | SYSTOLIC BLOOD PRESSURE: 97 MMHG

## 2020-11-23 VITALS — SYSTOLIC BLOOD PRESSURE: 123 MMHG | DIASTOLIC BLOOD PRESSURE: 38 MMHG

## 2020-11-23 VITALS — DIASTOLIC BLOOD PRESSURE: 48 MMHG | SYSTOLIC BLOOD PRESSURE: 105 MMHG

## 2020-11-23 VITALS — SYSTOLIC BLOOD PRESSURE: 100 MMHG | DIASTOLIC BLOOD PRESSURE: 39 MMHG

## 2020-11-23 VITALS — SYSTOLIC BLOOD PRESSURE: 126 MMHG | DIASTOLIC BLOOD PRESSURE: 51 MMHG

## 2020-11-23 VITALS — SYSTOLIC BLOOD PRESSURE: 140 MMHG | DIASTOLIC BLOOD PRESSURE: 44 MMHG

## 2020-11-23 VITALS — SYSTOLIC BLOOD PRESSURE: 84 MMHG | DIASTOLIC BLOOD PRESSURE: 26 MMHG

## 2020-11-23 VITALS — DIASTOLIC BLOOD PRESSURE: 100 MMHG | SYSTOLIC BLOOD PRESSURE: 122 MMHG

## 2020-11-23 VITALS — DIASTOLIC BLOOD PRESSURE: 41 MMHG | SYSTOLIC BLOOD PRESSURE: 120 MMHG

## 2020-11-23 VITALS — DIASTOLIC BLOOD PRESSURE: 28 MMHG | SYSTOLIC BLOOD PRESSURE: 91 MMHG

## 2020-11-23 VITALS — DIASTOLIC BLOOD PRESSURE: 57 MMHG | SYSTOLIC BLOOD PRESSURE: 99 MMHG

## 2020-11-23 VITALS — SYSTOLIC BLOOD PRESSURE: 119 MMHG | DIASTOLIC BLOOD PRESSURE: 41 MMHG

## 2020-11-23 VITALS — SYSTOLIC BLOOD PRESSURE: 115 MMHG | DIASTOLIC BLOOD PRESSURE: 46 MMHG

## 2020-11-23 VITALS — DIASTOLIC BLOOD PRESSURE: 45 MMHG | SYSTOLIC BLOOD PRESSURE: 107 MMHG

## 2020-11-23 VITALS — DIASTOLIC BLOOD PRESSURE: 59 MMHG | SYSTOLIC BLOOD PRESSURE: 143 MMHG

## 2020-11-23 VITALS — SYSTOLIC BLOOD PRESSURE: 120 MMHG | DIASTOLIC BLOOD PRESSURE: 57 MMHG

## 2020-11-23 VITALS — SYSTOLIC BLOOD PRESSURE: 102 MMHG | DIASTOLIC BLOOD PRESSURE: 57 MMHG

## 2020-11-23 VITALS — SYSTOLIC BLOOD PRESSURE: 150 MMHG | DIASTOLIC BLOOD PRESSURE: 50 MMHG

## 2020-11-23 VITALS — SYSTOLIC BLOOD PRESSURE: 119 MMHG | DIASTOLIC BLOOD PRESSURE: 52 MMHG

## 2020-11-23 VITALS — DIASTOLIC BLOOD PRESSURE: 27 MMHG | SYSTOLIC BLOOD PRESSURE: 96 MMHG

## 2020-11-23 VITALS — SYSTOLIC BLOOD PRESSURE: 85 MMHG | DIASTOLIC BLOOD PRESSURE: 43 MMHG

## 2020-11-23 VITALS — SYSTOLIC BLOOD PRESSURE: 110 MMHG | DIASTOLIC BLOOD PRESSURE: 46 MMHG

## 2020-11-23 VITALS — DIASTOLIC BLOOD PRESSURE: 48 MMHG | SYSTOLIC BLOOD PRESSURE: 148 MMHG

## 2020-11-23 VITALS — DIASTOLIC BLOOD PRESSURE: 43 MMHG | SYSTOLIC BLOOD PRESSURE: 109 MMHG

## 2020-11-23 VITALS — DIASTOLIC BLOOD PRESSURE: 47 MMHG | SYSTOLIC BLOOD PRESSURE: 95 MMHG

## 2020-11-23 VITALS — DIASTOLIC BLOOD PRESSURE: 54 MMHG | SYSTOLIC BLOOD PRESSURE: 121 MMHG

## 2020-11-23 VITALS — DIASTOLIC BLOOD PRESSURE: 83 MMHG | SYSTOLIC BLOOD PRESSURE: 136 MMHG

## 2020-11-23 VITALS — DIASTOLIC BLOOD PRESSURE: 53 MMHG | SYSTOLIC BLOOD PRESSURE: 150 MMHG

## 2020-11-23 VITALS — DIASTOLIC BLOOD PRESSURE: 42 MMHG | SYSTOLIC BLOOD PRESSURE: 107 MMHG

## 2020-11-23 VITALS — SYSTOLIC BLOOD PRESSURE: 138 MMHG | DIASTOLIC BLOOD PRESSURE: 60 MMHG

## 2020-11-23 VITALS — DIASTOLIC BLOOD PRESSURE: 33 MMHG | SYSTOLIC BLOOD PRESSURE: 118 MMHG

## 2020-11-23 VITALS — SYSTOLIC BLOOD PRESSURE: 115 MMHG | DIASTOLIC BLOOD PRESSURE: 45 MMHG

## 2020-11-23 VITALS — SYSTOLIC BLOOD PRESSURE: 85 MMHG | DIASTOLIC BLOOD PRESSURE: 31 MMHG

## 2020-11-23 VITALS — DIASTOLIC BLOOD PRESSURE: 42 MMHG | SYSTOLIC BLOOD PRESSURE: 120 MMHG

## 2020-11-23 VITALS — DIASTOLIC BLOOD PRESSURE: 45 MMHG | SYSTOLIC BLOOD PRESSURE: 103 MMHG

## 2020-11-23 VITALS — DIASTOLIC BLOOD PRESSURE: 41 MMHG | SYSTOLIC BLOOD PRESSURE: 101 MMHG

## 2020-11-23 VITALS — DIASTOLIC BLOOD PRESSURE: 33 MMHG | SYSTOLIC BLOOD PRESSURE: 97 MMHG

## 2020-11-23 VITALS — DIASTOLIC BLOOD PRESSURE: 50 MMHG | SYSTOLIC BLOOD PRESSURE: 131 MMHG

## 2020-11-23 VITALS — DIASTOLIC BLOOD PRESSURE: 35 MMHG | SYSTOLIC BLOOD PRESSURE: 77 MMHG

## 2020-11-23 VITALS — DIASTOLIC BLOOD PRESSURE: 55 MMHG | SYSTOLIC BLOOD PRESSURE: 172 MMHG

## 2020-11-23 LAB
ANION GAP SERPL CALC-SCNC: 3 MMOL/L (ref 7–16)
BUN SERPL-MCNC: 36 MG/DL (ref 7–18)
CALCIUM SERPL-MCNC: 8.8 MG/DL (ref 8.5–10.1)
CHLORIDE SERPL-SCNC: 103 MMOL/L (ref 98–107)
CO2 SERPL-SCNC: 36 MMOL/L (ref 21–32)
CREAT SERPL-MCNC: 0.8 MG/DL (ref 0.6–1)
ERYTHROCYTE [DISTWIDTH] IN BLOOD BY AUTOMATED COUNT: 18.4 % (ref 10.5–14.5)
GLUCOSE SERPL-MCNC: 62 MG/DL (ref 74–106)
HCT VFR BLD CALC: 33.3 % (ref 37–47)
HGB BLD-MCNC: 10.8 GM/DL (ref 12–15)
MAGNESIUM SERPL-MCNC: 2.4 MG/DL (ref 1.8–2.4)
MCH RBC QN AUTO: 25.9 PG (ref 26–34)
MCHC RBC AUTO-ENTMCNC: 32.4 G/DL (ref 28–37)
MCV RBC: 80 FL (ref 80–100)
PLATELET # BLD: 197 THOU/UL (ref 150–400)
POTASSIUM SERPL-SCNC: 3.4 MMOL/L (ref 3.5–5.1)
RBC # BLD AUTO: 4.17 MIL/UL (ref 4.2–5)
SODIUM SERPL-SCNC: 142 MMOL/L (ref 136–145)
WBC # BLD AUTO: 22.1 THOU/UL (ref 4–11)

## 2020-11-23 NOTE — NUR
chart review. noted she had to be intubated, and nutritional support. had
bronch over past weekend. will cont following as needed for dc needs. cm spoke
with son mina who lives out of state, he wanted to know if he was to come
home if "would be even able to visit"?/mina. cm education that with covd +
pt there is no visitor allowed unless hospital calls family in for emergent
need. " ok thank you"/son. will cont following as needed for dc needs.

## 2020-11-23 NOTE — NUR
Recommend enteral nutrition of vital high protein to reach goal of 45ml/hr. If
residuals >250ml then could trial reglan.  Defer any fluid needs to physician.

## 2020-11-24 VITALS — SYSTOLIC BLOOD PRESSURE: 136 MMHG | DIASTOLIC BLOOD PRESSURE: 46 MMHG

## 2020-11-24 VITALS — DIASTOLIC BLOOD PRESSURE: 43 MMHG | SYSTOLIC BLOOD PRESSURE: 116 MMHG

## 2020-11-24 VITALS — DIASTOLIC BLOOD PRESSURE: 50 MMHG | SYSTOLIC BLOOD PRESSURE: 141 MMHG

## 2020-11-24 VITALS — SYSTOLIC BLOOD PRESSURE: 167 MMHG | DIASTOLIC BLOOD PRESSURE: 48 MMHG

## 2020-11-24 VITALS — DIASTOLIC BLOOD PRESSURE: 46 MMHG | SYSTOLIC BLOOD PRESSURE: 134 MMHG

## 2020-11-24 VITALS — SYSTOLIC BLOOD PRESSURE: 104 MMHG | DIASTOLIC BLOOD PRESSURE: 40 MMHG

## 2020-11-24 VITALS — DIASTOLIC BLOOD PRESSURE: 31 MMHG | SYSTOLIC BLOOD PRESSURE: 70 MMHG

## 2020-11-24 VITALS — DIASTOLIC BLOOD PRESSURE: 49 MMHG | SYSTOLIC BLOOD PRESSURE: 146 MMHG

## 2020-11-24 VITALS — SYSTOLIC BLOOD PRESSURE: 160 MMHG | DIASTOLIC BLOOD PRESSURE: 52 MMHG

## 2020-11-24 VITALS — DIASTOLIC BLOOD PRESSURE: 46 MMHG | SYSTOLIC BLOOD PRESSURE: 144 MMHG

## 2020-11-24 VITALS — DIASTOLIC BLOOD PRESSURE: 49 MMHG | SYSTOLIC BLOOD PRESSURE: 154 MMHG

## 2020-11-24 VITALS — SYSTOLIC BLOOD PRESSURE: 139 MMHG | DIASTOLIC BLOOD PRESSURE: 46 MMHG

## 2020-11-24 VITALS — SYSTOLIC BLOOD PRESSURE: 103 MMHG | DIASTOLIC BLOOD PRESSURE: 34 MMHG

## 2020-11-24 VITALS — SYSTOLIC BLOOD PRESSURE: 148 MMHG | DIASTOLIC BLOOD PRESSURE: 50 MMHG

## 2020-11-24 VITALS — DIASTOLIC BLOOD PRESSURE: 47 MMHG | SYSTOLIC BLOOD PRESSURE: 124 MMHG

## 2020-11-24 VITALS — DIASTOLIC BLOOD PRESSURE: 54 MMHG | SYSTOLIC BLOOD PRESSURE: 159 MMHG

## 2020-11-24 VITALS — SYSTOLIC BLOOD PRESSURE: 102 MMHG | DIASTOLIC BLOOD PRESSURE: 42 MMHG

## 2020-11-24 VITALS — DIASTOLIC BLOOD PRESSURE: 64 MMHG | SYSTOLIC BLOOD PRESSURE: 148 MMHG

## 2020-11-24 VITALS — DIASTOLIC BLOOD PRESSURE: 56 MMHG | SYSTOLIC BLOOD PRESSURE: 143 MMHG

## 2020-11-24 VITALS — SYSTOLIC BLOOD PRESSURE: 151 MMHG | DIASTOLIC BLOOD PRESSURE: 55 MMHG

## 2020-11-24 VITALS — SYSTOLIC BLOOD PRESSURE: 124 MMHG | DIASTOLIC BLOOD PRESSURE: 43 MMHG

## 2020-11-24 VITALS — DIASTOLIC BLOOD PRESSURE: 46 MMHG | SYSTOLIC BLOOD PRESSURE: 124 MMHG

## 2020-11-24 VITALS — DIASTOLIC BLOOD PRESSURE: 51 MMHG | SYSTOLIC BLOOD PRESSURE: 155 MMHG

## 2020-11-24 VITALS — DIASTOLIC BLOOD PRESSURE: 39 MMHG | SYSTOLIC BLOOD PRESSURE: 120 MMHG

## 2020-11-24 VITALS — SYSTOLIC BLOOD PRESSURE: 144 MMHG | DIASTOLIC BLOOD PRESSURE: 51 MMHG

## 2020-11-24 VITALS — SYSTOLIC BLOOD PRESSURE: 99 MMHG | DIASTOLIC BLOOD PRESSURE: 62 MMHG

## 2020-11-24 VITALS — SYSTOLIC BLOOD PRESSURE: 114 MMHG | DIASTOLIC BLOOD PRESSURE: 43 MMHG

## 2020-11-24 VITALS — DIASTOLIC BLOOD PRESSURE: 40 MMHG | SYSTOLIC BLOOD PRESSURE: 112 MMHG

## 2020-11-24 VITALS — DIASTOLIC BLOOD PRESSURE: 46 MMHG | SYSTOLIC BLOOD PRESSURE: 122 MMHG

## 2020-11-24 VITALS — DIASTOLIC BLOOD PRESSURE: 39 MMHG | SYSTOLIC BLOOD PRESSURE: 114 MMHG

## 2020-11-24 VITALS — DIASTOLIC BLOOD PRESSURE: 56 MMHG | SYSTOLIC BLOOD PRESSURE: 153 MMHG

## 2020-11-24 VITALS — SYSTOLIC BLOOD PRESSURE: 100 MMHG | DIASTOLIC BLOOD PRESSURE: 36 MMHG

## 2020-11-24 VITALS — SYSTOLIC BLOOD PRESSURE: 161 MMHG | DIASTOLIC BLOOD PRESSURE: 50 MMHG

## 2020-11-24 VITALS — DIASTOLIC BLOOD PRESSURE: 53 MMHG | SYSTOLIC BLOOD PRESSURE: 133 MMHG

## 2020-11-24 VITALS — SYSTOLIC BLOOD PRESSURE: 153 MMHG | DIASTOLIC BLOOD PRESSURE: 48 MMHG

## 2020-11-24 VITALS — DIASTOLIC BLOOD PRESSURE: 35 MMHG | SYSTOLIC BLOOD PRESSURE: 98 MMHG

## 2020-11-24 VITALS — SYSTOLIC BLOOD PRESSURE: 146 MMHG | DIASTOLIC BLOOD PRESSURE: 52 MMHG

## 2020-11-24 VITALS — DIASTOLIC BLOOD PRESSURE: 55 MMHG | SYSTOLIC BLOOD PRESSURE: 152 MMHG

## 2020-11-24 VITALS — SYSTOLIC BLOOD PRESSURE: 146 MMHG | DIASTOLIC BLOOD PRESSURE: 57 MMHG

## 2020-11-24 VITALS — SYSTOLIC BLOOD PRESSURE: 148 MMHG | DIASTOLIC BLOOD PRESSURE: 54 MMHG

## 2020-11-24 NOTE — NUR
ASSESSMENTS AS CHARTED, MEDS CHARTED AS GIVEN. ON VENT SEDATED DURING SHIFT.
RESPIRATORY RATE, PEEP AND FIO2 WERE ALL LOWERED DURING SHIFT. TUBE FEED
REMAINS ON HOLD DURING SHIFT. PATIENT REMAINS IN RESTRAINTS DURING SHIFT. DOES
NOT APPEAR TO BE IN PAIN. FALL PRECAUTIONS IN PLACE DURING SHIFT.

## 2020-11-24 NOTE — NUR
TUBE FEEDING STARTED. LEVOPHED TITRATION TO OFF UNSUCCESSFUL, BUT DECREASED.
PT NOT WAKING UP OR FOLLOWING COMMANDS WITH SEDATION VACATION. URINE OUTPUT
ADEQUATE. PROGRESSING TOWARD GOALS.

## 2020-11-25 VITALS — DIASTOLIC BLOOD PRESSURE: 54 MMHG | SYSTOLIC BLOOD PRESSURE: 161 MMHG

## 2020-11-25 VITALS — DIASTOLIC BLOOD PRESSURE: 33 MMHG | SYSTOLIC BLOOD PRESSURE: 102 MMHG

## 2020-11-25 VITALS — DIASTOLIC BLOOD PRESSURE: 45 MMHG | SYSTOLIC BLOOD PRESSURE: 138 MMHG

## 2020-11-25 VITALS — DIASTOLIC BLOOD PRESSURE: 49 MMHG | SYSTOLIC BLOOD PRESSURE: 164 MMHG

## 2020-11-25 VITALS — DIASTOLIC BLOOD PRESSURE: 44 MMHG | SYSTOLIC BLOOD PRESSURE: 128 MMHG

## 2020-11-25 VITALS — DIASTOLIC BLOOD PRESSURE: 40 MMHG | SYSTOLIC BLOOD PRESSURE: 123 MMHG

## 2020-11-25 VITALS — SYSTOLIC BLOOD PRESSURE: 131 MMHG | DIASTOLIC BLOOD PRESSURE: 37 MMHG

## 2020-11-25 VITALS — SYSTOLIC BLOOD PRESSURE: 125 MMHG | DIASTOLIC BLOOD PRESSURE: 43 MMHG

## 2020-11-25 VITALS — SYSTOLIC BLOOD PRESSURE: 129 MMHG | DIASTOLIC BLOOD PRESSURE: 43 MMHG

## 2020-11-25 VITALS — SYSTOLIC BLOOD PRESSURE: 133 MMHG | DIASTOLIC BLOOD PRESSURE: 40 MMHG

## 2020-11-25 VITALS — SYSTOLIC BLOOD PRESSURE: 128 MMHG | DIASTOLIC BLOOD PRESSURE: 35 MMHG

## 2020-11-25 VITALS — DIASTOLIC BLOOD PRESSURE: 33 MMHG | SYSTOLIC BLOOD PRESSURE: 116 MMHG

## 2020-11-25 VITALS — SYSTOLIC BLOOD PRESSURE: 132 MMHG | DIASTOLIC BLOOD PRESSURE: 28 MMHG

## 2020-11-25 VITALS — SYSTOLIC BLOOD PRESSURE: 129 MMHG | DIASTOLIC BLOOD PRESSURE: 34 MMHG

## 2020-11-25 VITALS — SYSTOLIC BLOOD PRESSURE: 123 MMHG | DIASTOLIC BLOOD PRESSURE: 40 MMHG

## 2020-11-25 VITALS — SYSTOLIC BLOOD PRESSURE: 123 MMHG | DIASTOLIC BLOOD PRESSURE: 42 MMHG

## 2020-11-25 VITALS — DIASTOLIC BLOOD PRESSURE: 32 MMHG | SYSTOLIC BLOOD PRESSURE: 149 MMHG

## 2020-11-25 VITALS — SYSTOLIC BLOOD PRESSURE: 129 MMHG | DIASTOLIC BLOOD PRESSURE: 49 MMHG

## 2020-11-25 VITALS — SYSTOLIC BLOOD PRESSURE: 129 MMHG | DIASTOLIC BLOOD PRESSURE: 33 MMHG

## 2020-11-25 VITALS — DIASTOLIC BLOOD PRESSURE: 54 MMHG | SYSTOLIC BLOOD PRESSURE: 154 MMHG

## 2020-11-25 VITALS — SYSTOLIC BLOOD PRESSURE: 116 MMHG | DIASTOLIC BLOOD PRESSURE: 44 MMHG

## 2020-11-25 VITALS — DIASTOLIC BLOOD PRESSURE: 106 MMHG | SYSTOLIC BLOOD PRESSURE: 139 MMHG

## 2020-11-25 VITALS — SYSTOLIC BLOOD PRESSURE: 123 MMHG | DIASTOLIC BLOOD PRESSURE: 51 MMHG

## 2020-11-25 VITALS — SYSTOLIC BLOOD PRESSURE: 117 MMHG | DIASTOLIC BLOOD PRESSURE: 57 MMHG

## 2020-11-25 VITALS — DIASTOLIC BLOOD PRESSURE: 44 MMHG | SYSTOLIC BLOOD PRESSURE: 163 MMHG

## 2020-11-25 VITALS — SYSTOLIC BLOOD PRESSURE: 119 MMHG | DIASTOLIC BLOOD PRESSURE: 44 MMHG

## 2020-11-25 VITALS — SYSTOLIC BLOOD PRESSURE: 181 MMHG | DIASTOLIC BLOOD PRESSURE: 55 MMHG

## 2020-11-25 VITALS — SYSTOLIC BLOOD PRESSURE: 161 MMHG | DIASTOLIC BLOOD PRESSURE: 50 MMHG

## 2020-11-25 VITALS — SYSTOLIC BLOOD PRESSURE: 121 MMHG | DIASTOLIC BLOOD PRESSURE: 43 MMHG

## 2020-11-25 VITALS — DIASTOLIC BLOOD PRESSURE: 56 MMHG | SYSTOLIC BLOOD PRESSURE: 163 MMHG

## 2020-11-25 VITALS — SYSTOLIC BLOOD PRESSURE: 100 MMHG | DIASTOLIC BLOOD PRESSURE: 37 MMHG

## 2020-11-25 VITALS — SYSTOLIC BLOOD PRESSURE: 78 MMHG | DIASTOLIC BLOOD PRESSURE: 28 MMHG

## 2020-11-25 VITALS — DIASTOLIC BLOOD PRESSURE: 45 MMHG | SYSTOLIC BLOOD PRESSURE: 114 MMHG

## 2020-11-25 VITALS — SYSTOLIC BLOOD PRESSURE: 126 MMHG | DIASTOLIC BLOOD PRESSURE: 45 MMHG

## 2020-11-25 VITALS — SYSTOLIC BLOOD PRESSURE: 138 MMHG | DIASTOLIC BLOOD PRESSURE: 51 MMHG

## 2020-11-25 VITALS — SYSTOLIC BLOOD PRESSURE: 104 MMHG | DIASTOLIC BLOOD PRESSURE: 47 MMHG

## 2020-11-25 VITALS — DIASTOLIC BLOOD PRESSURE: 31 MMHG | SYSTOLIC BLOOD PRESSURE: 117 MMHG

## 2020-11-25 VITALS — DIASTOLIC BLOOD PRESSURE: 40 MMHG | SYSTOLIC BLOOD PRESSURE: 136 MMHG

## 2020-11-25 LAB
ANION GAP SERPL CALC-SCNC: 8 MMOL/L (ref 7–16)
BUN SERPL-MCNC: 47 MG/DL (ref 7–18)
CALCIUM SERPL-MCNC: 9.2 MG/DL (ref 8.5–10.1)
CHLORIDE SERPL-SCNC: 102 MMOL/L (ref 98–107)
CO2 SERPL-SCNC: 31 MMOL/L (ref 21–32)
CREAT SERPL-MCNC: 0.9 MG/DL (ref 0.6–1)
GLUCOSE SERPL-MCNC: 214 MG/DL (ref 74–106)
POTASSIUM SERPL-SCNC: 3.7 MMOL/L (ref 3.5–5.1)
SODIUM SERPL-SCNC: 141 MMOL/L (ref 136–145)

## 2020-11-25 NOTE — NUR
ASSESSMENTS AS CHARTED, MEDS CHARTED AS GIVEN. PATIENT RESTING IN BED ON
VENTILATOR, REPCEDX, LEVOPHED,FENTANYL, PROPOFOL INFUSING DURING SHIFT. ACCU
CHECKS COVERED EACH TIME. TUBE FEED RUNNING DURING SHIFT. PATIENTS RESTRAINTS
WERE DISCONTINUED FOR NOW AS SHE HAS MADE NO ATTEMPT TO GRAB AT MEDICAL
EQUIPMENT SINCE SEDATED. FALL PRECAUTIONS IN PLACE DURING SHIFT.

## 2020-11-25 NOTE — NUR
PATIENT NOT PROGRESSING TOWARDS OUTCOME GOALS AS EVIDENT BY.  ATTEMPT TO WEAN
LEVOPHED OFF AND MEAN ARTERIAL PRESSURE IS LESS THAN 60 MMHG.  PROPOFOL
TAPPERED.  DR MCGRAW IN TO SEE PATIENT AND SPOKE WITH THE FAMILY.  WILL
CONTINUE TO MONITOR.

## 2020-11-25 NOTE — NUR
chart review. she remains intubated, possible will need trach and peg. bedside
nurse cont to update pt son. no anticipated dc over the weekend.

## 2020-11-25 NOTE — NUR
ASSUMMED CARE FROM THE NIGHT SHIFT AT 0700.  PROPOFOL TURNED OFF AT 0755 AND
RESUMED AT 0835.  MONITOR REMAINED SR IN THE 80'S, RESP RATE IN THE LOW
TEEN'S. BP ELEVATED AND O2 SAT FELL FROM 95% TO 90%.  WOULD NOT FOLLOW ANY
COMMAND OR ANY CHANGE IN NEURO ASSESSMENT NOTED.  TUBE FEEDING RESIDUAL HIGH.
WILL CONTINUE TO MONITOR.

## 2020-11-25 NOTE — NUR
SPOKE WITH PATIENT'S SON, JEFFREY AND UPDATED HIM ON HIS MOTHER'S STATUS AS WELL
AS THE POC.  STATED THAT HE WOULD TALK WITH OTHER FAMILY MEMBERS.

## 2020-11-26 VITALS — SYSTOLIC BLOOD PRESSURE: 160 MMHG | DIASTOLIC BLOOD PRESSURE: 59 MMHG

## 2020-11-26 VITALS — DIASTOLIC BLOOD PRESSURE: 39 MMHG | SYSTOLIC BLOOD PRESSURE: 141 MMHG

## 2020-11-26 VITALS — DIASTOLIC BLOOD PRESSURE: 60 MMHG | SYSTOLIC BLOOD PRESSURE: 170 MMHG

## 2020-11-26 VITALS — DIASTOLIC BLOOD PRESSURE: 47 MMHG | SYSTOLIC BLOOD PRESSURE: 161 MMHG

## 2020-11-26 VITALS — SYSTOLIC BLOOD PRESSURE: 159 MMHG | DIASTOLIC BLOOD PRESSURE: 54 MMHG

## 2020-11-26 VITALS — DIASTOLIC BLOOD PRESSURE: 52 MMHG | SYSTOLIC BLOOD PRESSURE: 154 MMHG

## 2020-11-26 VITALS — SYSTOLIC BLOOD PRESSURE: 150 MMHG | DIASTOLIC BLOOD PRESSURE: 56 MMHG

## 2020-11-26 VITALS — DIASTOLIC BLOOD PRESSURE: 58 MMHG | SYSTOLIC BLOOD PRESSURE: 163 MMHG

## 2020-11-26 VITALS — DIASTOLIC BLOOD PRESSURE: 44 MMHG | SYSTOLIC BLOOD PRESSURE: 142 MMHG

## 2020-11-26 VITALS — DIASTOLIC BLOOD PRESSURE: 43 MMHG | SYSTOLIC BLOOD PRESSURE: 150 MMHG

## 2020-11-26 VITALS — DIASTOLIC BLOOD PRESSURE: 30 MMHG | SYSTOLIC BLOOD PRESSURE: 120 MMHG

## 2020-11-26 VITALS — SYSTOLIC BLOOD PRESSURE: 143 MMHG | DIASTOLIC BLOOD PRESSURE: 48 MMHG

## 2020-11-26 VITALS — DIASTOLIC BLOOD PRESSURE: 40 MMHG | SYSTOLIC BLOOD PRESSURE: 137 MMHG

## 2020-11-26 VITALS — SYSTOLIC BLOOD PRESSURE: 126 MMHG | DIASTOLIC BLOOD PRESSURE: 33 MMHG

## 2020-11-26 VITALS — SYSTOLIC BLOOD PRESSURE: 166 MMHG | DIASTOLIC BLOOD PRESSURE: 66 MMHG

## 2020-11-26 VITALS — SYSTOLIC BLOOD PRESSURE: 150 MMHG | DIASTOLIC BLOOD PRESSURE: 40 MMHG

## 2020-11-26 VITALS — SYSTOLIC BLOOD PRESSURE: 157 MMHG | DIASTOLIC BLOOD PRESSURE: 46 MMHG

## 2020-11-26 VITALS — DIASTOLIC BLOOD PRESSURE: 33 MMHG | SYSTOLIC BLOOD PRESSURE: 117 MMHG

## 2020-11-26 VITALS — DIASTOLIC BLOOD PRESSURE: 41 MMHG | SYSTOLIC BLOOD PRESSURE: 135 MMHG

## 2020-11-26 VITALS — DIASTOLIC BLOOD PRESSURE: 43 MMHG | SYSTOLIC BLOOD PRESSURE: 143 MMHG

## 2020-11-26 VITALS — DIASTOLIC BLOOD PRESSURE: 51 MMHG | SYSTOLIC BLOOD PRESSURE: 139 MMHG

## 2020-11-26 VITALS — DIASTOLIC BLOOD PRESSURE: 52 MMHG | SYSTOLIC BLOOD PRESSURE: 155 MMHG

## 2020-11-26 VITALS — SYSTOLIC BLOOD PRESSURE: 164 MMHG | DIASTOLIC BLOOD PRESSURE: 59 MMHG

## 2020-11-26 VITALS — SYSTOLIC BLOOD PRESSURE: 149 MMHG | DIASTOLIC BLOOD PRESSURE: 55 MMHG

## 2020-11-26 VITALS — DIASTOLIC BLOOD PRESSURE: 43 MMHG | SYSTOLIC BLOOD PRESSURE: 148 MMHG

## 2020-11-26 VITALS — SYSTOLIC BLOOD PRESSURE: 131 MMHG | DIASTOLIC BLOOD PRESSURE: 52 MMHG

## 2020-11-26 VITALS — SYSTOLIC BLOOD PRESSURE: 150 MMHG | DIASTOLIC BLOOD PRESSURE: 54 MMHG

## 2020-11-26 LAB
ANION GAP SERPL CALC-SCNC: 5 MMOL/L (ref 7–16)
BE(VIVO): 8.1 MMOL/L
BUN SERPL-MCNC: 60 MG/DL (ref 7–18)
CALCIUM SERPL-MCNC: 9.1 MG/DL (ref 8.5–10.1)
CHLORIDE SERPL-SCNC: 103 MMOL/L (ref 98–107)
CO2 SERPL-SCNC: 32 MMOL/L (ref 21–32)
CREAT SERPL-MCNC: 0.8 MG/DL (ref 0.6–1)
ERYTHROCYTE [DISTWIDTH] IN BLOOD BY AUTOMATED COUNT: 17.8 % (ref 10.5–14.5)
GLUCOSE SERPL-MCNC: 225 MG/DL (ref 74–106)
HCO3 BLD-SCNC: 32.9 MMOL/L (ref 22–26)
HCT VFR BLD CALC: 26.6 % (ref 37–47)
HGB BLD-MCNC: 8.4 GM/DL (ref 12–15)
MCH RBC QN AUTO: 25.6 PG (ref 26–34)
MCHC RBC AUTO-ENTMCNC: 31.5 G/DL (ref 28–37)
MCV RBC: 81.1 FL (ref 80–100)
PCO2 BLD: 47.7 MMHG (ref 35–45)
PLATELET # BLD: 102 THOU/UL (ref 150–400)
PO2 BLD: 58.3 MMHG (ref 80–100)
POTASSIUM SERPL-SCNC: 3.7 MMOL/L (ref 3.5–5.1)
RBC # BLD AUTO: 3.28 MIL/UL (ref 4.2–5)
SODIUM SERPL-SCNC: 140 MMOL/L (ref 136–145)
WBC # BLD AUTO: 16.3 THOU/UL (ref 4–11)

## 2020-11-26 NOTE — NUR
PT REMAINS INTUBATED/SEDATED ON THE VENT. PROPOFOL, FENTANYL AND PRECEDEX GTTS
FOR VENT MANAGEMENT. WILL OPEN EYES AND BLINK ON COMMAND WHEN SEDATION IS
LIGHTENED, DOES NOT FOLLOW COMMANDS WITH EXTREMITIES. LEVOP[HED GTT REMAINS
OFF. ADEQUATE URINE OUTPUT. IV LASIX ON HOLD D/T AM LABS. TUBE FEEDINGS
CONTINUOUS, NOTED RESIDUALS. RUNNING AT 30 ML/HR, GOAL RATE 45ML/HR. ADDED
200CC Q6HR WATER FLUSHES. DR. JOHNSON AND DR. KAPLAN ROUNDED TODAY, DISCUSSED
PLAN OF CARE.

## 2020-11-27 VITALS — SYSTOLIC BLOOD PRESSURE: 178 MMHG | DIASTOLIC BLOOD PRESSURE: 69 MMHG

## 2020-11-27 VITALS — DIASTOLIC BLOOD PRESSURE: 86 MMHG | SYSTOLIC BLOOD PRESSURE: 180 MMHG

## 2020-11-27 VITALS — DIASTOLIC BLOOD PRESSURE: 36 MMHG | SYSTOLIC BLOOD PRESSURE: 112 MMHG

## 2020-11-27 VITALS — SYSTOLIC BLOOD PRESSURE: 140 MMHG | DIASTOLIC BLOOD PRESSURE: 72 MMHG

## 2020-11-27 VITALS — SYSTOLIC BLOOD PRESSURE: 148 MMHG | DIASTOLIC BLOOD PRESSURE: 39 MMHG

## 2020-11-27 VITALS — SYSTOLIC BLOOD PRESSURE: 147 MMHG | DIASTOLIC BLOOD PRESSURE: 57 MMHG

## 2020-11-27 VITALS — DIASTOLIC BLOOD PRESSURE: 66 MMHG | SYSTOLIC BLOOD PRESSURE: 182 MMHG

## 2020-11-27 VITALS — SYSTOLIC BLOOD PRESSURE: 169 MMHG | DIASTOLIC BLOOD PRESSURE: 58 MMHG

## 2020-11-27 VITALS — DIASTOLIC BLOOD PRESSURE: 58 MMHG | SYSTOLIC BLOOD PRESSURE: 119 MMHG

## 2020-11-27 VITALS — DIASTOLIC BLOOD PRESSURE: 80 MMHG | SYSTOLIC BLOOD PRESSURE: 166 MMHG

## 2020-11-27 VITALS — SYSTOLIC BLOOD PRESSURE: 158 MMHG | DIASTOLIC BLOOD PRESSURE: 60 MMHG

## 2020-11-27 VITALS — SYSTOLIC BLOOD PRESSURE: 187 MMHG | DIASTOLIC BLOOD PRESSURE: 59 MMHG

## 2020-11-27 VITALS — SYSTOLIC BLOOD PRESSURE: 145 MMHG | DIASTOLIC BLOOD PRESSURE: 56 MMHG

## 2020-11-27 VITALS — SYSTOLIC BLOOD PRESSURE: 170 MMHG | DIASTOLIC BLOOD PRESSURE: 61 MMHG

## 2020-11-27 VITALS — SYSTOLIC BLOOD PRESSURE: 120 MMHG | DIASTOLIC BLOOD PRESSURE: 59 MMHG

## 2020-11-27 VITALS — DIASTOLIC BLOOD PRESSURE: 59 MMHG | SYSTOLIC BLOOD PRESSURE: 157 MMHG

## 2020-11-27 VITALS — SYSTOLIC BLOOD PRESSURE: 120 MMHG | DIASTOLIC BLOOD PRESSURE: 58 MMHG

## 2020-11-27 VITALS — SYSTOLIC BLOOD PRESSURE: 178 MMHG | DIASTOLIC BLOOD PRESSURE: 66 MMHG

## 2020-11-27 VITALS — DIASTOLIC BLOOD PRESSURE: 79 MMHG | SYSTOLIC BLOOD PRESSURE: 148 MMHG

## 2020-11-27 VITALS — DIASTOLIC BLOOD PRESSURE: 49 MMHG | SYSTOLIC BLOOD PRESSURE: 136 MMHG

## 2020-11-27 VITALS — SYSTOLIC BLOOD PRESSURE: 168 MMHG | DIASTOLIC BLOOD PRESSURE: 62 MMHG

## 2020-11-27 VITALS — SYSTOLIC BLOOD PRESSURE: 169 MMHG | DIASTOLIC BLOOD PRESSURE: 64 MMHG

## 2020-11-27 VITALS — DIASTOLIC BLOOD PRESSURE: 67 MMHG | SYSTOLIC BLOOD PRESSURE: 170 MMHG

## 2020-11-27 VITALS — SYSTOLIC BLOOD PRESSURE: 124 MMHG | DIASTOLIC BLOOD PRESSURE: 65 MMHG

## 2020-11-27 VITALS — SYSTOLIC BLOOD PRESSURE: 171 MMHG | DIASTOLIC BLOOD PRESSURE: 61 MMHG

## 2020-11-27 VITALS — DIASTOLIC BLOOD PRESSURE: 67 MMHG | SYSTOLIC BLOOD PRESSURE: 129 MMHG

## 2020-11-27 VITALS — DIASTOLIC BLOOD PRESSURE: 47 MMHG | SYSTOLIC BLOOD PRESSURE: 113 MMHG

## 2020-11-27 VITALS — DIASTOLIC BLOOD PRESSURE: 52 MMHG | SYSTOLIC BLOOD PRESSURE: 167 MMHG

## 2020-11-27 VITALS — SYSTOLIC BLOOD PRESSURE: 154 MMHG | DIASTOLIC BLOOD PRESSURE: 60 MMHG

## 2020-11-27 VITALS — SYSTOLIC BLOOD PRESSURE: 177 MMHG | DIASTOLIC BLOOD PRESSURE: 36 MMHG

## 2020-11-27 VITALS — DIASTOLIC BLOOD PRESSURE: 57 MMHG | SYSTOLIC BLOOD PRESSURE: 117 MMHG

## 2020-11-27 VITALS — SYSTOLIC BLOOD PRESSURE: 174 MMHG | DIASTOLIC BLOOD PRESSURE: 65 MMHG

## 2020-11-27 VITALS — DIASTOLIC BLOOD PRESSURE: 71 MMHG | SYSTOLIC BLOOD PRESSURE: 158 MMHG

## 2020-11-27 VITALS — SYSTOLIC BLOOD PRESSURE: 161 MMHG | DIASTOLIC BLOOD PRESSURE: 64 MMHG

## 2020-11-27 VITALS — SYSTOLIC BLOOD PRESSURE: 102 MMHG | DIASTOLIC BLOOD PRESSURE: 33 MMHG

## 2020-11-27 VITALS — SYSTOLIC BLOOD PRESSURE: 180 MMHG | DIASTOLIC BLOOD PRESSURE: 68 MMHG

## 2020-11-27 VITALS — SYSTOLIC BLOOD PRESSURE: 130 MMHG | DIASTOLIC BLOOD PRESSURE: 52 MMHG

## 2020-11-27 VITALS — DIASTOLIC BLOOD PRESSURE: 58 MMHG | SYSTOLIC BLOOD PRESSURE: 160 MMHG

## 2020-11-27 VITALS — SYSTOLIC BLOOD PRESSURE: 156 MMHG | DIASTOLIC BLOOD PRESSURE: 50 MMHG

## 2020-11-27 VITALS — SYSTOLIC BLOOD PRESSURE: 109 MMHG | DIASTOLIC BLOOD PRESSURE: 40 MMHG

## 2020-11-27 VITALS — DIASTOLIC BLOOD PRESSURE: 47 MMHG | SYSTOLIC BLOOD PRESSURE: 131 MMHG

## 2020-11-27 VITALS — SYSTOLIC BLOOD PRESSURE: 170 MMHG | DIASTOLIC BLOOD PRESSURE: 45 MMHG

## 2020-11-27 VITALS — SYSTOLIC BLOOD PRESSURE: 164 MMHG | DIASTOLIC BLOOD PRESSURE: 58 MMHG

## 2020-11-27 LAB
ANION GAP SERPL CALC-SCNC: 4 MMOL/L (ref 7–16)
BUN SERPL-MCNC: 44 MG/DL (ref 7–18)
CALCIUM SERPL-MCNC: 8.9 MG/DL (ref 8.5–10.1)
CHLORIDE SERPL-SCNC: 107 MMOL/L (ref 98–107)
CO2 SERPL-SCNC: 34 MMOL/L (ref 21–32)
CREAT SERPL-MCNC: 0.6 MG/DL (ref 0.6–1)
GLUCOSE SERPL-MCNC: 202 MG/DL (ref 74–106)
POTASSIUM SERPL-SCNC: 3.6 MMOL/L (ref 3.5–5.1)
SODIUM SERPL-SCNC: 145 MMOL/L (ref 136–145)

## 2020-11-27 NOTE — NUR
PATIENT REMAINS INTUBATED/SEDATED FOR VENT MANAGEMENT. RESPIRATORY STATUS NOT
IMPROVING. INCREASED FIO2 TO 80% TO MAINTAIN SATS. NOTED WITH ABDOMINAL
BLEEDING AND BLEEDING IN ET TUBE/ORAL SECRETIONS. HELD LOVENOX THIS AM AND
UPDATED DR. KAPLAN. HYPERTENSIVE THIS AM. ORDERS FOR PRN HYDRALAZINE. DR. MCGRAW UPDATED FAMILY AND IS DISCUSSING POTENTIAL PALLIATIVE ROUTE DUE TO POOR
PROGNOSIS.

## 2020-11-27 NOTE — NUR
NO CHANGE IN ASSESSMENT.  REMAINS INTUBATED AND SEDATED. 1000 CC UO THIS
SHIFT.  ALEXI TUBE FEEDING. NOT PROGRESSING TOWARD GOALS

## 2020-11-28 VITALS — DIASTOLIC BLOOD PRESSURE: 39 MMHG | SYSTOLIC BLOOD PRESSURE: 150 MMHG

## 2020-11-28 VITALS — SYSTOLIC BLOOD PRESSURE: 132 MMHG | DIASTOLIC BLOOD PRESSURE: 49 MMHG

## 2020-11-28 VITALS — DIASTOLIC BLOOD PRESSURE: 37 MMHG | SYSTOLIC BLOOD PRESSURE: 123 MMHG

## 2020-11-28 VITALS — DIASTOLIC BLOOD PRESSURE: 46 MMHG | SYSTOLIC BLOOD PRESSURE: 124 MMHG

## 2020-11-28 VITALS — SYSTOLIC BLOOD PRESSURE: 185 MMHG | DIASTOLIC BLOOD PRESSURE: 121 MMHG

## 2020-11-28 VITALS — SYSTOLIC BLOOD PRESSURE: 179 MMHG | DIASTOLIC BLOOD PRESSURE: 60 MMHG

## 2020-11-28 VITALS — DIASTOLIC BLOOD PRESSURE: 42 MMHG | SYSTOLIC BLOOD PRESSURE: 116 MMHG

## 2020-11-28 VITALS — SYSTOLIC BLOOD PRESSURE: 198 MMHG | DIASTOLIC BLOOD PRESSURE: 54 MMHG

## 2020-11-28 VITALS — DIASTOLIC BLOOD PRESSURE: 54 MMHG | SYSTOLIC BLOOD PRESSURE: 151 MMHG

## 2020-11-28 VITALS — SYSTOLIC BLOOD PRESSURE: 178 MMHG | DIASTOLIC BLOOD PRESSURE: 54 MMHG

## 2020-11-28 VITALS — SYSTOLIC BLOOD PRESSURE: 150 MMHG | DIASTOLIC BLOOD PRESSURE: 60 MMHG

## 2020-11-28 VITALS — DIASTOLIC BLOOD PRESSURE: 58 MMHG | SYSTOLIC BLOOD PRESSURE: 175 MMHG

## 2020-11-28 VITALS — SYSTOLIC BLOOD PRESSURE: 158 MMHG | DIASTOLIC BLOOD PRESSURE: 56 MMHG

## 2020-11-28 VITALS — DIASTOLIC BLOOD PRESSURE: 37 MMHG | SYSTOLIC BLOOD PRESSURE: 126 MMHG

## 2020-11-28 VITALS — SYSTOLIC BLOOD PRESSURE: 115 MMHG | DIASTOLIC BLOOD PRESSURE: 55 MMHG

## 2020-11-28 VITALS — DIASTOLIC BLOOD PRESSURE: 37 MMHG | SYSTOLIC BLOOD PRESSURE: 113 MMHG

## 2020-11-28 VITALS — SYSTOLIC BLOOD PRESSURE: 176 MMHG | DIASTOLIC BLOOD PRESSURE: 50 MMHG

## 2020-11-28 VITALS — SYSTOLIC BLOOD PRESSURE: 123 MMHG | DIASTOLIC BLOOD PRESSURE: 39 MMHG

## 2020-11-28 VITALS — DIASTOLIC BLOOD PRESSURE: 40 MMHG | SYSTOLIC BLOOD PRESSURE: 111 MMHG

## 2020-11-28 VITALS — SYSTOLIC BLOOD PRESSURE: 145 MMHG | DIASTOLIC BLOOD PRESSURE: 48 MMHG

## 2020-11-28 VITALS — DIASTOLIC BLOOD PRESSURE: 43 MMHG | SYSTOLIC BLOOD PRESSURE: 166 MMHG

## 2020-11-28 VITALS — DIASTOLIC BLOOD PRESSURE: 41 MMHG | SYSTOLIC BLOOD PRESSURE: 110 MMHG

## 2020-11-28 VITALS — SYSTOLIC BLOOD PRESSURE: 156 MMHG | DIASTOLIC BLOOD PRESSURE: 47 MMHG

## 2020-11-28 VITALS — SYSTOLIC BLOOD PRESSURE: 116 MMHG | DIASTOLIC BLOOD PRESSURE: 33 MMHG

## 2020-11-28 VITALS — DIASTOLIC BLOOD PRESSURE: 44 MMHG | SYSTOLIC BLOOD PRESSURE: 137 MMHG

## 2020-11-28 VITALS — SYSTOLIC BLOOD PRESSURE: 165 MMHG | DIASTOLIC BLOOD PRESSURE: 54 MMHG

## 2020-11-28 VITALS — SYSTOLIC BLOOD PRESSURE: 175 MMHG | DIASTOLIC BLOOD PRESSURE: 52 MMHG

## 2020-11-28 VITALS — SYSTOLIC BLOOD PRESSURE: 131 MMHG | DIASTOLIC BLOOD PRESSURE: 38 MMHG

## 2020-11-28 VITALS — DIASTOLIC BLOOD PRESSURE: 51 MMHG | SYSTOLIC BLOOD PRESSURE: 179 MMHG

## 2020-11-28 VITALS — SYSTOLIC BLOOD PRESSURE: 173 MMHG | DIASTOLIC BLOOD PRESSURE: 50 MMHG

## 2020-11-28 VITALS — SYSTOLIC BLOOD PRESSURE: 167 MMHG | DIASTOLIC BLOOD PRESSURE: 35 MMHG

## 2020-11-28 VITALS — SYSTOLIC BLOOD PRESSURE: 105 MMHG | DIASTOLIC BLOOD PRESSURE: 33 MMHG

## 2020-11-28 VITALS — DIASTOLIC BLOOD PRESSURE: 54 MMHG | SYSTOLIC BLOOD PRESSURE: 159 MMHG

## 2020-11-28 VITALS — DIASTOLIC BLOOD PRESSURE: 40 MMHG | SYSTOLIC BLOOD PRESSURE: 128 MMHG

## 2020-11-28 VITALS — DIASTOLIC BLOOD PRESSURE: 46 MMHG | SYSTOLIC BLOOD PRESSURE: 119 MMHG

## 2020-11-28 VITALS — SYSTOLIC BLOOD PRESSURE: 113 MMHG | DIASTOLIC BLOOD PRESSURE: 40 MMHG

## 2020-11-28 VITALS — SYSTOLIC BLOOD PRESSURE: 138 MMHG | DIASTOLIC BLOOD PRESSURE: 46 MMHG

## 2020-11-28 VITALS — SYSTOLIC BLOOD PRESSURE: 115 MMHG | DIASTOLIC BLOOD PRESSURE: 53 MMHG

## 2020-11-28 VITALS — DIASTOLIC BLOOD PRESSURE: 34 MMHG | SYSTOLIC BLOOD PRESSURE: 103 MMHG

## 2020-11-28 VITALS — DIASTOLIC BLOOD PRESSURE: 46 MMHG | SYSTOLIC BLOOD PRESSURE: 105 MMHG

## 2020-11-28 VITALS — DIASTOLIC BLOOD PRESSURE: 38 MMHG | SYSTOLIC BLOOD PRESSURE: 112 MMHG

## 2020-11-28 VITALS — DIASTOLIC BLOOD PRESSURE: 41 MMHG | SYSTOLIC BLOOD PRESSURE: 124 MMHG

## 2020-11-28 VITALS — SYSTOLIC BLOOD PRESSURE: 172 MMHG | DIASTOLIC BLOOD PRESSURE: 52 MMHG

## 2020-11-28 VITALS — SYSTOLIC BLOOD PRESSURE: 178 MMHG | DIASTOLIC BLOOD PRESSURE: 56 MMHG

## 2020-11-28 VITALS — SYSTOLIC BLOOD PRESSURE: 118 MMHG | DIASTOLIC BLOOD PRESSURE: 42 MMHG

## 2020-11-28 VITALS — SYSTOLIC BLOOD PRESSURE: 122 MMHG | DIASTOLIC BLOOD PRESSURE: 43 MMHG

## 2020-11-28 VITALS — DIASTOLIC BLOOD PRESSURE: 40 MMHG | SYSTOLIC BLOOD PRESSURE: 189 MMHG

## 2020-11-28 VITALS — DIASTOLIC BLOOD PRESSURE: 55 MMHG | SYSTOLIC BLOOD PRESSURE: 196 MMHG

## 2020-11-28 VITALS — SYSTOLIC BLOOD PRESSURE: 162 MMHG | DIASTOLIC BLOOD PRESSURE: 40 MMHG

## 2020-11-28 VITALS — DIASTOLIC BLOOD PRESSURE: 40 MMHG | SYSTOLIC BLOOD PRESSURE: 121 MMHG

## 2020-11-28 VITALS — DIASTOLIC BLOOD PRESSURE: 34 MMHG | SYSTOLIC BLOOD PRESSURE: 96 MMHG

## 2020-11-28 VITALS — DIASTOLIC BLOOD PRESSURE: 47 MMHG | SYSTOLIC BLOOD PRESSURE: 142 MMHG

## 2020-11-28 VITALS — SYSTOLIC BLOOD PRESSURE: 166 MMHG | DIASTOLIC BLOOD PRESSURE: 62 MMHG

## 2020-11-28 VITALS — SYSTOLIC BLOOD PRESSURE: 111 MMHG | DIASTOLIC BLOOD PRESSURE: 39 MMHG

## 2020-11-28 VITALS — DIASTOLIC BLOOD PRESSURE: 39 MMHG | SYSTOLIC BLOOD PRESSURE: 117 MMHG

## 2020-11-28 VITALS — DIASTOLIC BLOOD PRESSURE: 55 MMHG | SYSTOLIC BLOOD PRESSURE: 177 MMHG

## 2020-11-28 VITALS — DIASTOLIC BLOOD PRESSURE: 46 MMHG | SYSTOLIC BLOOD PRESSURE: 156 MMHG

## 2020-11-28 VITALS — SYSTOLIC BLOOD PRESSURE: 152 MMHG | DIASTOLIC BLOOD PRESSURE: 45 MMHG

## 2020-11-28 LAB
ANION GAP SERPL CALC-SCNC: 3 MMOL/L (ref 7–16)
BUN SERPL-MCNC: 50 MG/DL (ref 7–18)
CALCIUM SERPL-MCNC: 9 MG/DL (ref 8.5–10.1)
CHLORIDE SERPL-SCNC: 108 MMOL/L (ref 98–107)
CO2 SERPL-SCNC: 34 MMOL/L (ref 21–32)
CREAT SERPL-MCNC: 0.6 MG/DL (ref 0.6–1)
ERYTHROCYTE [DISTWIDTH] IN BLOOD BY AUTOMATED COUNT: 18.3 % (ref 10.5–14.5)
GLUCOSE SERPL-MCNC: 175 MG/DL (ref 74–106)
HCT VFR BLD CALC: 21.8 % (ref 37–47)
HGB BLD-MCNC: 6.8 GM/DL (ref 12–15)
MCH RBC QN AUTO: 25.7 PG (ref 26–34)
MCHC RBC AUTO-ENTMCNC: 31.3 G/DL (ref 28–37)
MCV RBC: 82.2 FL (ref 80–100)
PLATELET # BLD: 81 THOU/UL (ref 150–400)
POTASSIUM SERPL-SCNC: 3.4 MMOL/L (ref 3.5–5.1)
RBC # BLD AUTO: 2.65 MIL/UL (ref 4.2–5)
SODIUM SERPL-SCNC: 145 MMOL/L (ref 136–145)
WBC # BLD AUTO: 12.6 THOU/UL (ref 4–11)

## 2020-11-28 PROCEDURE — 0W9930Z DRAINAGE OF RIGHT PLEURAL CAVITY WITH DRAINAGE DEVICE, PERCUTANEOUS APPROACH: ICD-10-PCS | Performed by: INTERNAL MEDICINE

## 2020-11-28 PROCEDURE — 30233N1 TRANSFUSION OF NONAUTOLOGOUS RED BLOOD CELLS INTO PERIPHERAL VEIN, PERCUTANEOUS APPROACH: ICD-10-PCS | Performed by: HOSPITALIST

## 2020-11-28 NOTE — NUR
NO SIGNIFICANT EVENTS OVER NIGHT. PT REMAINS SEDATED WITH PROPOFOL, PRECEDEX,
AND FENTANYL DRIPS. SPO2 >90% ON VENT WITH 80% FIO2. BLOOD-TINGED ORAL AND ETT
SECRETIONS. PT HAD 3 SOFT, LOOSE BOWEL MOVEMENTS THIS SHIFT. TF VIA OGT; TF
RUNNING AT GOAL RATE OF 45ML/HR. TOLERATING WELL. AFEBRILE. NOT PROGRESSING
WELL TOWARD POC GOALS. REPORT GIVEN TO ONCOMING NURSE.

## 2020-11-28 NOTE — NUR
IR NURSE HERE TO TAKE PATIENT TO IR FOR CT PLACEMEMT BUT PATIENT'S O2 SAT IS
UPPER 80'S, PLAN TO PROCEDE AT BEDSIDE.  FIO2 INCREASED %.

## 2020-11-28 NOTE — NUR
ASSUMMED CARE AT 0700 FROM THE NIGHT SHIFT.  RADIOLOGIST CALLED AT 0723 AND
REPORTED RT SIDED PNEUMO.  DR. JOHNSON PAGED AT 0725.  CALLED BACK AT O730 ORDERS
NOTED.

## 2020-11-29 VITALS — SYSTOLIC BLOOD PRESSURE: 108 MMHG | DIASTOLIC BLOOD PRESSURE: 51 MMHG

## 2020-11-29 VITALS — DIASTOLIC BLOOD PRESSURE: 43 MMHG | SYSTOLIC BLOOD PRESSURE: 130 MMHG

## 2020-11-29 VITALS — SYSTOLIC BLOOD PRESSURE: 146 MMHG | DIASTOLIC BLOOD PRESSURE: 45 MMHG

## 2020-11-29 VITALS — DIASTOLIC BLOOD PRESSURE: 40 MMHG | SYSTOLIC BLOOD PRESSURE: 135 MMHG

## 2020-11-29 VITALS — DIASTOLIC BLOOD PRESSURE: 43 MMHG | SYSTOLIC BLOOD PRESSURE: 114 MMHG

## 2020-11-29 VITALS — SYSTOLIC BLOOD PRESSURE: 144 MMHG | DIASTOLIC BLOOD PRESSURE: 45 MMHG

## 2020-11-29 VITALS — SYSTOLIC BLOOD PRESSURE: 123 MMHG | DIASTOLIC BLOOD PRESSURE: 36 MMHG

## 2020-11-29 VITALS — DIASTOLIC BLOOD PRESSURE: 33 MMHG | SYSTOLIC BLOOD PRESSURE: 102 MMHG

## 2020-11-29 VITALS — SYSTOLIC BLOOD PRESSURE: 141 MMHG | DIASTOLIC BLOOD PRESSURE: 48 MMHG

## 2020-11-29 VITALS — SYSTOLIC BLOOD PRESSURE: 113 MMHG | DIASTOLIC BLOOD PRESSURE: 45 MMHG

## 2020-11-29 VITALS — SYSTOLIC BLOOD PRESSURE: 138 MMHG | DIASTOLIC BLOOD PRESSURE: 33 MMHG

## 2020-11-29 VITALS — SYSTOLIC BLOOD PRESSURE: 141 MMHG | DIASTOLIC BLOOD PRESSURE: 36 MMHG

## 2020-11-29 VITALS — SYSTOLIC BLOOD PRESSURE: 151 MMHG | DIASTOLIC BLOOD PRESSURE: 41 MMHG

## 2020-11-29 VITALS — DIASTOLIC BLOOD PRESSURE: 37 MMHG | SYSTOLIC BLOOD PRESSURE: 136 MMHG

## 2020-11-29 VITALS — SYSTOLIC BLOOD PRESSURE: 133 MMHG | DIASTOLIC BLOOD PRESSURE: 33 MMHG

## 2020-11-29 VITALS — DIASTOLIC BLOOD PRESSURE: 37 MMHG | SYSTOLIC BLOOD PRESSURE: 106 MMHG

## 2020-11-29 VITALS — SYSTOLIC BLOOD PRESSURE: 133 MMHG | DIASTOLIC BLOOD PRESSURE: 40 MMHG

## 2020-11-29 VITALS — DIASTOLIC BLOOD PRESSURE: 49 MMHG | SYSTOLIC BLOOD PRESSURE: 129 MMHG

## 2020-11-29 VITALS — DIASTOLIC BLOOD PRESSURE: 45 MMHG | SYSTOLIC BLOOD PRESSURE: 111 MMHG

## 2020-11-29 VITALS — SYSTOLIC BLOOD PRESSURE: 94 MMHG | DIASTOLIC BLOOD PRESSURE: 31 MMHG

## 2020-11-29 VITALS — SYSTOLIC BLOOD PRESSURE: 159 MMHG | DIASTOLIC BLOOD PRESSURE: 48 MMHG

## 2020-11-29 VITALS — SYSTOLIC BLOOD PRESSURE: 118 MMHG | DIASTOLIC BLOOD PRESSURE: 46 MMHG

## 2020-11-29 VITALS — SYSTOLIC BLOOD PRESSURE: 155 MMHG | DIASTOLIC BLOOD PRESSURE: 51 MMHG

## 2020-11-29 LAB
ANION GAP SERPL CALC-SCNC: 7 MMOL/L (ref 7–16)
BUN SERPL-MCNC: 40 MG/DL (ref 7–18)
CALCIUM SERPL-MCNC: 9.2 MG/DL (ref 8.5–10.1)
CHLORIDE SERPL-SCNC: 107 MMOL/L (ref 98–107)
CO2 SERPL-SCNC: 32 MMOL/L (ref 21–32)
CREAT SERPL-MCNC: 0.4 MG/DL (ref 0.6–1)
ERYTHROCYTE [DISTWIDTH] IN BLOOD BY AUTOMATED COUNT: 18.9 % (ref 10.5–14.5)
GLUCOSE SERPL-MCNC: 208 MG/DL (ref 74–106)
HCT VFR BLD CALC: 31.6 % (ref 37–47)
HGB BLD-MCNC: 9.9 GM/DL (ref 12–15)
MAGNESIUM SERPL-MCNC: 2.2 MG/DL (ref 1.8–2.4)
MCH RBC QN AUTO: 26.5 PG (ref 26–34)
MCHC RBC AUTO-ENTMCNC: 31.3 G/DL (ref 28–37)
MCV RBC: 84.8 FL (ref 80–100)
PLATELET # BLD: 100 THOU/UL (ref 150–400)
POTASSIUM SERPL-SCNC: 4 MMOL/L (ref 3.5–5.1)
RBC # BLD AUTO: 3.73 MIL/UL (ref 4.2–5)
SODIUM SERPL-SCNC: 146 MMOL/L (ref 136–145)
WBC # BLD AUTO: 18.2 THOU/UL (ref 4–11)

## 2020-11-29 NOTE — NUR
1350 - PT'S FAMILY MEMBER (DAUGHTER IN LAW) CALLED TO INQUIRE ABOUT PT'S
INFORMATION, RN ASKED WHAT INFORMATION SHE WOULD LIKE TO KNOW OR HAVE HEARD
FROM THE PHYSICIAN, FAMILY MEMBER INQUIRED ABOUT A TUBE THAT WAS STUCK DOWN
THE LUNG, RN EXPLAINED THAT THE CHEST TUBE WAS PLACED AND IT IS DRAINING AS IT
SHOULD, AND THAT CHEST TUBES ARE USED TO HELP THE PT IN FACILITATING LUNG
EXPANSION; NO FURTHER EXPLANATIONS. RN LET THE PT FAMILY KNOW TO CALL FOR
QUESTIONS AND THAT SOME QUESTIONS MAY HAVE TO BE DEFERRED TO THE MD. FAMILY
MEMBER VOCALIZED UNDERSTANDING.

## 2020-11-29 NOTE — NUR
PT REMAINS SEDATED WITH PROPOFOL, FENTANYL, AND PRECEDEX WHILE ON THE VENT.
SPO2 >92% WHILE ON VENT WITH 80% FIO2. BLOOD-TINGED SPUTUM SUCTIONED FROM ETT.
RIGHT LATERAL CHEST TUBE PATENT WITH SEROSANGUINOUS DRAINAGE; AIR LEAK NOTED.
1 UNIT RBC TRANSFUSED PER DR ORDER FOR HGB < 7.  PT TOLERATED TRANSFUSION
WELL.  TF VIA OGT; RESIDUALS LESS THAN 200ML SO FAR THIS SHIFT. SHE HAS HAD
ONE SOFT STOOL SO FAR THIS SHIFT. NOT PROGRESSING WELL TOWARD POC GOALS. WILL
CONTINUE TO MONITOR CLOSELY.
 
2227 - SPOKE WITH PT'S SON GLORIA. UPDATE PROVIDED ON NEED FOR BLOOD
TRANSFUSION AND THAT CHEST TUBE WAS PLACED ON  11/28. GLORIA STATED HE HAS NOT
TALKED TO HIS BROTHER (JR SARAN) FOR A FEW DAYS. GLORIA INDICATED HE MAY
ALSO BE PT'S DPOA. HE WAS ENCOURAGED TO PROVIDE DPOA PAPERWORK TO BE PLACED IN
PT'S CHART. GLORIA STATED HE WILL CALL MONDAY (11/30) TO TALK TO CM. HE WOULD
ALSO LIKE TO TALK TO DR MCGRAW AGAIN ON MONDAY REGARDING HIS MOTHER'S
PROGONSIS AND POC.

## 2020-11-29 NOTE — NUR
PT WAS SEEN BY //RT/ TODAY.
NO ACUTE CHANGES AT THIS TIME, PT'S PRECEDEX HAS BEEN INCREASED FROM 1GTT TO
1.4GTT TO PROMOTE COMFORT/VENTILATION TOLERANCE. PT HAS ALSO BEEN MAINTAINED
ON Q2H TURNS AS WELL AS THE CLRT TO PROMOTE LUNG CAPACITY FACILITATION. PT'S
DAUGHER CALLED EARLIER IN THE DAY, AND IS IN THE NOTE. ALL MEDS ADMINISTERED
AS ORDRED. AM XRAY SHOWED NO SIGNIFICANT CHANGES/PROCESSES. NO OTHER ISSUES AT
THSI TIME. PT HAD A REDCUTION IN THE FIO2 FROM 80 TO 70% SO RN BELIEVES
PATIENT IS SLOWLY PROGRESSING TOWARDS DISCHARGE. CHEST TUBE IS DRAINING
SEROSANUINEOUS FLUID, URINE OUTPUT THIS HISFT HAS BEEN WDL.

## 2020-11-30 VITALS — DIASTOLIC BLOOD PRESSURE: 39 MMHG | SYSTOLIC BLOOD PRESSURE: 186 MMHG

## 2020-11-30 VITALS — SYSTOLIC BLOOD PRESSURE: 151 MMHG | DIASTOLIC BLOOD PRESSURE: 58 MMHG

## 2020-11-30 VITALS — SYSTOLIC BLOOD PRESSURE: 97 MMHG | DIASTOLIC BLOOD PRESSURE: 43 MMHG

## 2020-11-30 VITALS — SYSTOLIC BLOOD PRESSURE: 143 MMHG | DIASTOLIC BLOOD PRESSURE: 41 MMHG

## 2020-11-30 VITALS — SYSTOLIC BLOOD PRESSURE: 202 MMHG | DIASTOLIC BLOOD PRESSURE: 99 MMHG

## 2020-11-30 VITALS — DIASTOLIC BLOOD PRESSURE: 41 MMHG | SYSTOLIC BLOOD PRESSURE: 142 MMHG

## 2020-11-30 VITALS — SYSTOLIC BLOOD PRESSURE: 183 MMHG | DIASTOLIC BLOOD PRESSURE: 31 MMHG

## 2020-11-30 VITALS — DIASTOLIC BLOOD PRESSURE: 42 MMHG | SYSTOLIC BLOOD PRESSURE: 112 MMHG

## 2020-11-30 VITALS — SYSTOLIC BLOOD PRESSURE: 159 MMHG | DIASTOLIC BLOOD PRESSURE: 43 MMHG

## 2020-11-30 VITALS — SYSTOLIC BLOOD PRESSURE: 143 MMHG | DIASTOLIC BLOOD PRESSURE: 46 MMHG

## 2020-11-30 VITALS — SYSTOLIC BLOOD PRESSURE: 107 MMHG | DIASTOLIC BLOOD PRESSURE: 40 MMHG

## 2020-11-30 VITALS — SYSTOLIC BLOOD PRESSURE: 162 MMHG | DIASTOLIC BLOOD PRESSURE: 63 MMHG

## 2020-11-30 VITALS — DIASTOLIC BLOOD PRESSURE: 37 MMHG | SYSTOLIC BLOOD PRESSURE: 130 MMHG

## 2020-11-30 VITALS — SYSTOLIC BLOOD PRESSURE: 108 MMHG | DIASTOLIC BLOOD PRESSURE: 44 MMHG

## 2020-11-30 VITALS — DIASTOLIC BLOOD PRESSURE: 51 MMHG | SYSTOLIC BLOOD PRESSURE: 201 MMHG

## 2020-11-30 VITALS — SYSTOLIC BLOOD PRESSURE: 156 MMHG | DIASTOLIC BLOOD PRESSURE: 40 MMHG

## 2020-11-30 VITALS — DIASTOLIC BLOOD PRESSURE: 46 MMHG | SYSTOLIC BLOOD PRESSURE: 121 MMHG

## 2020-11-30 VITALS — SYSTOLIC BLOOD PRESSURE: 152 MMHG | DIASTOLIC BLOOD PRESSURE: 48 MMHG

## 2020-11-30 VITALS — DIASTOLIC BLOOD PRESSURE: 46 MMHG | SYSTOLIC BLOOD PRESSURE: 104 MMHG

## 2020-11-30 VITALS — DIASTOLIC BLOOD PRESSURE: 44 MMHG | SYSTOLIC BLOOD PRESSURE: 124 MMHG

## 2020-11-30 VITALS — DIASTOLIC BLOOD PRESSURE: 62 MMHG | SYSTOLIC BLOOD PRESSURE: 181 MMHG

## 2020-11-30 VITALS — DIASTOLIC BLOOD PRESSURE: 47 MMHG | SYSTOLIC BLOOD PRESSURE: 122 MMHG

## 2020-11-30 VITALS — SYSTOLIC BLOOD PRESSURE: 100 MMHG | DIASTOLIC BLOOD PRESSURE: 41 MMHG

## 2020-11-30 VITALS — SYSTOLIC BLOOD PRESSURE: 131 MMHG | DIASTOLIC BLOOD PRESSURE: 39 MMHG

## 2020-11-30 VITALS — DIASTOLIC BLOOD PRESSURE: 34 MMHG | SYSTOLIC BLOOD PRESSURE: 126 MMHG

## 2020-11-30 VITALS — DIASTOLIC BLOOD PRESSURE: 58 MMHG | SYSTOLIC BLOOD PRESSURE: 165 MMHG

## 2020-11-30 VITALS — DIASTOLIC BLOOD PRESSURE: 50 MMHG | SYSTOLIC BLOOD PRESSURE: 141 MMHG

## 2020-11-30 VITALS — SYSTOLIC BLOOD PRESSURE: 146 MMHG | DIASTOLIC BLOOD PRESSURE: 39 MMHG

## 2020-11-30 VITALS — DIASTOLIC BLOOD PRESSURE: 41 MMHG | SYSTOLIC BLOOD PRESSURE: 129 MMHG

## 2020-11-30 VITALS — SYSTOLIC BLOOD PRESSURE: 126 MMHG | DIASTOLIC BLOOD PRESSURE: 34 MMHG

## 2020-11-30 VITALS — DIASTOLIC BLOOD PRESSURE: 48 MMHG | SYSTOLIC BLOOD PRESSURE: 156 MMHG

## 2020-11-30 VITALS — DIASTOLIC BLOOD PRESSURE: 58 MMHG | SYSTOLIC BLOOD PRESSURE: 177 MMHG

## 2020-11-30 VITALS — DIASTOLIC BLOOD PRESSURE: 42 MMHG | SYSTOLIC BLOOD PRESSURE: 129 MMHG

## 2020-11-30 VITALS — DIASTOLIC BLOOD PRESSURE: 59 MMHG | SYSTOLIC BLOOD PRESSURE: 205 MMHG

## 2020-11-30 VITALS — SYSTOLIC BLOOD PRESSURE: 156 MMHG | DIASTOLIC BLOOD PRESSURE: 54 MMHG

## 2020-11-30 VITALS — SYSTOLIC BLOOD PRESSURE: 90 MMHG | DIASTOLIC BLOOD PRESSURE: 41 MMHG

## 2020-11-30 VITALS — DIASTOLIC BLOOD PRESSURE: 46 MMHG | SYSTOLIC BLOOD PRESSURE: 135 MMHG

## 2020-11-30 VITALS — SYSTOLIC BLOOD PRESSURE: 185 MMHG | DIASTOLIC BLOOD PRESSURE: 41 MMHG

## 2020-11-30 VITALS — SYSTOLIC BLOOD PRESSURE: 137 MMHG | DIASTOLIC BLOOD PRESSURE: 35 MMHG

## 2020-11-30 VITALS — DIASTOLIC BLOOD PRESSURE: 33 MMHG | SYSTOLIC BLOOD PRESSURE: 118 MMHG

## 2020-11-30 VITALS — DIASTOLIC BLOOD PRESSURE: 42 MMHG | SYSTOLIC BLOOD PRESSURE: 151 MMHG

## 2020-11-30 VITALS — SYSTOLIC BLOOD PRESSURE: 117 MMHG | DIASTOLIC BLOOD PRESSURE: 42 MMHG

## 2020-11-30 VITALS — SYSTOLIC BLOOD PRESSURE: 110 MMHG | DIASTOLIC BLOOD PRESSURE: 36 MMHG

## 2020-11-30 VITALS — SYSTOLIC BLOOD PRESSURE: 142 MMHG | DIASTOLIC BLOOD PRESSURE: 41 MMHG

## 2020-11-30 VITALS — SYSTOLIC BLOOD PRESSURE: 162 MMHG | DIASTOLIC BLOOD PRESSURE: 46 MMHG

## 2020-11-30 VITALS — DIASTOLIC BLOOD PRESSURE: 50 MMHG | SYSTOLIC BLOOD PRESSURE: 187 MMHG

## 2020-11-30 VITALS — SYSTOLIC BLOOD PRESSURE: 144 MMHG | DIASTOLIC BLOOD PRESSURE: 43 MMHG

## 2020-11-30 VITALS — DIASTOLIC BLOOD PRESSURE: 37 MMHG | SYSTOLIC BLOOD PRESSURE: 103 MMHG

## 2020-11-30 VITALS — SYSTOLIC BLOOD PRESSURE: 156 MMHG | DIASTOLIC BLOOD PRESSURE: 42 MMHG

## 2020-11-30 VITALS — DIASTOLIC BLOOD PRESSURE: 41 MMHG | SYSTOLIC BLOOD PRESSURE: 123 MMHG

## 2020-11-30 VITALS — DIASTOLIC BLOOD PRESSURE: 46 MMHG | SYSTOLIC BLOOD PRESSURE: 120 MMHG

## 2020-11-30 VITALS — SYSTOLIC BLOOD PRESSURE: 117 MMHG | DIASTOLIC BLOOD PRESSURE: 47 MMHG

## 2020-11-30 LAB
ANION GAP SERPL CALC-SCNC: 5 MMOL/L (ref 7–16)
BE(VIVO): -2.1 MMOL/L
BUN SERPL-MCNC: 50 MG/DL (ref 7–18)
CALCIUM SERPL-MCNC: 8.9 MG/DL (ref 8.5–10.1)
CHLORIDE SERPL-SCNC: 108 MMOL/L (ref 98–107)
CO2 SERPL-SCNC: 33 MMOL/L (ref 21–32)
CREAT SERPL-MCNC: 0.5 MG/DL (ref 0.6–1)
ERYTHROCYTE [DISTWIDTH] IN BLOOD BY AUTOMATED COUNT: 19.1 % (ref 10.5–14.5)
GLUCOSE SERPL-MCNC: 222 MG/DL (ref 74–106)
HCO3 BLD-SCNC: 23.8 MMOL/L (ref 22–26)
HCT VFR BLD CALC: 25.5 % (ref 37–47)
HGB BLD-MCNC: 8.1 GM/DL (ref 12–15)
MCH RBC QN AUTO: 27.1 PG (ref 26–34)
MCHC RBC AUTO-ENTMCNC: 31.8 G/DL (ref 28–37)
MCV RBC: 85 FL (ref 80–100)
PCO2 BLD: 45.8 MMHG (ref 35–45)
PLATELET # BLD: 75 THOU/UL (ref 150–400)
PO2 BLD: 57.5 MMHG (ref 80–100)
POTASSIUM SERPL-SCNC: 3.8 MMOL/L (ref 3.5–5.1)
RBC # BLD AUTO: 3 MIL/UL (ref 4.2–5)
SODIUM SERPL-SCNC: 146 MMOL/L (ref 136–145)
WBC # BLD AUTO: 12.4 THOU/UL (ref 4–11)

## 2020-11-30 NOTE — NUR
WHEN SEDATION IS TEMPORARILY STOPPED, PT OPENS EYES AND GRIMACES WITH NOXIOUS
STIMULI. VENT SETTINGS UNCHANGED W/ FIO2 90%. TOLERATING TUBE FEEDINGS WITH
RESIDUALS 100-160CC. RIGHT LATERAL CHEST TUBE TO 20CM SUCTION, DRESSING
CHANGED TODAY DUE TO MODERATE AIR LEAK AND DRAINAGE. MODERATE SUBCUTANEOUS
EMPHYSEMA NOTED TO RIGHT CHEST, RIGHT SHOULDER, MIGRATING TOWARDS THE LEFT AND
ACROSS THE MEDIASTINUM AND NECK AREAS. URINE OUTPUT REMAINS ADEQUATE. BM
TODAY. MAINTAIN CURRENT PLAN OF CARE.

## 2020-11-30 NOTE — EKG
[facility]
[address]
[place]   [address]                     ELECTROCARDIOGRAM REPORT      
_______________________________________________________________________________
 
Name:       TERENCE BUCIO              Room #:         240-P       ADM IN  
M.R.#:      [id number]                       Account #:      [id number]  
Admission:  20    Attend Phys:    Genia Teran MD  
Discharge:              Date of Birth:  [birth year]  
                                                          Report #: [id number]
                                                                    [id number]
_______________________________________________________________________________
THIS REPORT FOR:  
 
cc:  Anatoliy Wheat MD, Bernard O. MD Santiago, Patrick MD [facility]                                         ~
THIS REPORT FOR:   //name//                          
 
                          [facility]
                                       
Test Date:    2020               Test Time:    11:59:39
Pat Name:     TERENCE BUCIO           Department:   
Patient ID:   [id number]            Room:         240 P
Gender:       F                        Technician:   LALI
:          1945               Requested By: Kale Wilson
Order Number: [id number] MD:   Mikey Phillips
                                 Measurements
Intervals                              Axis          
Rate:         83                       P:            74
MT:           222                      QRS:          -22
QRSD:         107                      T:            117
QT:           347                                    
QTc:          408                                    
                           Interpretive Statements
Sinus rhythm
Prolonged MT interval
Borderline left axis deviation
Low voltage, precordial leads
Nonspecific T abnormalities, lateral leads
Compared to ECG 2020 17:09:16
First degree AV block now present
Low QRS voltage now present
T-wave abnormality now present
Sinus tachycardia no longer present
Poor R-wave progression no longer present
Electronically Signed On 2020 7:34:09 CST by Mikey Phillips
[email or web address]
 
 
 
 
 
 
 
 
  <ELECTRONICALLY SIGNED>
   By: Mikey Phillips MD, [facility]    
  20     0734
D: 20 1159                           _____________________________________
T: 20 1159                           Mikey Phillips MD, [facility]      /EPI

## 2020-11-30 NOTE — NUR
PT REMAINS SEDATED WITH PROPOFOL, FENTANYL, AND PRECEDEX WHILE ON THE VENT.
AFEBRILE. ADEQUATE URINE OUTPUT VIA QIU. TF VIA OGT; PT TOLERATING WELL.
RIGHT LATERAL CHEST TUBE TO -20 SUCTION; AIR LEAK PRESENT. EARLIER THIS
MORNING, PT DEVELOPED SUBCUTANEOUS AIR TO RIGHT CHEST. NOTIFIED DR JOHNSON AND
CHEST XRAY WAS OBTAINED. SPO2 REMAINED >92%. SUBCUTANEOUS AIR CONTINUED TO
INCREASE. PAGED PULMONOLOGIST AGAIN. REPORT WAS GIVEN TO DAY SHIFT RN,
WHO WILL FOLLOW-UP WITH THE PULMONOLIST. PT NOT PROGRESSING TOWARD POC GOALS.

## 2020-11-30 NOTE — NUR
chart review. she remains on vent, chest tube. nutritional support.  dr yi
following. cm called son farzad montero, no answer. will cont following as needed
for dc needs.

## 2020-11-30 NOTE — NUR
AT THE BEGINNING OF THE SHIFT, RN WAS REPORTED THAT SUBCUTANEOUS EMPHYSEMA WAS
PROGRESSIVELY GETTING LARGER POST CXR THIS AM. WHEN RN ASSESSED, SUBCUT
EMPHYSEMA HAD SPREAD OVER THE MEDIASTINUM AND TO LEFT CHEST AS WELL. DRESSING
CHANGED WAS DONE USING STERILE TECHNIQUE. APPLIED OIL EMERSED GUAZE/SPONGE
GUAZE/FOAM DRESSING OVER. THE SOUND OF AIRLEAK DURING INHALATION THAT WAS
PRESENT PRIOR TO DRESSING CHANGE WAS NO LONGER PRESENT. IT WAS ALSO NOTED THAT
PLEURAL FLUID DRAINAGE WAS SIGNIFICANT, FOUL SMELLING AND CHEST TUBE WAS
NOW AT 13CM. RN PLACED NEW CHUCKS/GUAZES UNDER TO MONITOR PLEURAL FLUID
LEAKAGE. RN TOLD BY  TO INCREASE CHEST TUBE SUCTION TO 30MMHG IF
NEEDED. MD IS AWARE OF CURRENT SITUATION AND ORDERS GIVEN WERE TO MONITOR FOR
PROGRESS. RN REPORTED THIS TO RECEIVING FLORA ALEMAN. RN SIGNING OFF AT THIS TIME

## 2020-12-01 VITALS — SYSTOLIC BLOOD PRESSURE: 136 MMHG | DIASTOLIC BLOOD PRESSURE: 47 MMHG

## 2020-12-01 VITALS — DIASTOLIC BLOOD PRESSURE: 52 MMHG | SYSTOLIC BLOOD PRESSURE: 126 MMHG

## 2020-12-01 VITALS — DIASTOLIC BLOOD PRESSURE: 50 MMHG | SYSTOLIC BLOOD PRESSURE: 185 MMHG

## 2020-12-01 VITALS — SYSTOLIC BLOOD PRESSURE: 134 MMHG | DIASTOLIC BLOOD PRESSURE: 50 MMHG

## 2020-12-01 VITALS — DIASTOLIC BLOOD PRESSURE: 36 MMHG | SYSTOLIC BLOOD PRESSURE: 91 MMHG

## 2020-12-01 VITALS — DIASTOLIC BLOOD PRESSURE: 39 MMHG | SYSTOLIC BLOOD PRESSURE: 110 MMHG

## 2020-12-01 VITALS — DIASTOLIC BLOOD PRESSURE: 31 MMHG | SYSTOLIC BLOOD PRESSURE: 106 MMHG

## 2020-12-01 VITALS — DIASTOLIC BLOOD PRESSURE: 35 MMHG | SYSTOLIC BLOOD PRESSURE: 108 MMHG

## 2020-12-01 VITALS — DIASTOLIC BLOOD PRESSURE: 49 MMHG | SYSTOLIC BLOOD PRESSURE: 163 MMHG

## 2020-12-01 VITALS — DIASTOLIC BLOOD PRESSURE: 50 MMHG | SYSTOLIC BLOOD PRESSURE: 153 MMHG

## 2020-12-01 VITALS — SYSTOLIC BLOOD PRESSURE: 86 MMHG | DIASTOLIC BLOOD PRESSURE: 33 MMHG

## 2020-12-01 VITALS — SYSTOLIC BLOOD PRESSURE: 101 MMHG | DIASTOLIC BLOOD PRESSURE: 40 MMHG

## 2020-12-01 VITALS — DIASTOLIC BLOOD PRESSURE: 60 MMHG | SYSTOLIC BLOOD PRESSURE: 142 MMHG

## 2020-12-01 VITALS — DIASTOLIC BLOOD PRESSURE: 41 MMHG | SYSTOLIC BLOOD PRESSURE: 154 MMHG

## 2020-12-01 VITALS — DIASTOLIC BLOOD PRESSURE: 43 MMHG | SYSTOLIC BLOOD PRESSURE: 131 MMHG

## 2020-12-01 VITALS — DIASTOLIC BLOOD PRESSURE: 46 MMHG | SYSTOLIC BLOOD PRESSURE: 169 MMHG

## 2020-12-01 VITALS — DIASTOLIC BLOOD PRESSURE: 53 MMHG | SYSTOLIC BLOOD PRESSURE: 156 MMHG

## 2020-12-01 VITALS — DIASTOLIC BLOOD PRESSURE: 39 MMHG | SYSTOLIC BLOOD PRESSURE: 111 MMHG

## 2020-12-01 VITALS — SYSTOLIC BLOOD PRESSURE: 179 MMHG | DIASTOLIC BLOOD PRESSURE: 48 MMHG

## 2020-12-01 VITALS — DIASTOLIC BLOOD PRESSURE: 43 MMHG | SYSTOLIC BLOOD PRESSURE: 140 MMHG

## 2020-12-01 VITALS — DIASTOLIC BLOOD PRESSURE: 57 MMHG | SYSTOLIC BLOOD PRESSURE: 150 MMHG

## 2020-12-01 VITALS — DIASTOLIC BLOOD PRESSURE: 51 MMHG | SYSTOLIC BLOOD PRESSURE: 119 MMHG

## 2020-12-01 VITALS — DIASTOLIC BLOOD PRESSURE: 48 MMHG | SYSTOLIC BLOOD PRESSURE: 175 MMHG

## 2020-12-01 VITALS — DIASTOLIC BLOOD PRESSURE: 42 MMHG | SYSTOLIC BLOOD PRESSURE: 135 MMHG

## 2020-12-01 VITALS — DIASTOLIC BLOOD PRESSURE: 31 MMHG | SYSTOLIC BLOOD PRESSURE: 89 MMHG

## 2020-12-01 VITALS — DIASTOLIC BLOOD PRESSURE: 39 MMHG | SYSTOLIC BLOOD PRESSURE: 114 MMHG

## 2020-12-01 VITALS — SYSTOLIC BLOOD PRESSURE: 145 MMHG | DIASTOLIC BLOOD PRESSURE: 53 MMHG

## 2020-12-01 VITALS — SYSTOLIC BLOOD PRESSURE: 130 MMHG | DIASTOLIC BLOOD PRESSURE: 39 MMHG

## 2020-12-01 VITALS — SYSTOLIC BLOOD PRESSURE: 139 MMHG | DIASTOLIC BLOOD PRESSURE: 49 MMHG

## 2020-12-01 VITALS — DIASTOLIC BLOOD PRESSURE: 45 MMHG | SYSTOLIC BLOOD PRESSURE: 141 MMHG

## 2020-12-01 VITALS — SYSTOLIC BLOOD PRESSURE: 120 MMHG | DIASTOLIC BLOOD PRESSURE: 46 MMHG

## 2020-12-01 VITALS — SYSTOLIC BLOOD PRESSURE: 176 MMHG | DIASTOLIC BLOOD PRESSURE: 50 MMHG

## 2020-12-01 VITALS — DIASTOLIC BLOOD PRESSURE: 41 MMHG | SYSTOLIC BLOOD PRESSURE: 106 MMHG

## 2020-12-01 VITALS — SYSTOLIC BLOOD PRESSURE: 120 MMHG | DIASTOLIC BLOOD PRESSURE: 39 MMHG

## 2020-12-01 VITALS — SYSTOLIC BLOOD PRESSURE: 152 MMHG | DIASTOLIC BLOOD PRESSURE: 55 MMHG

## 2020-12-01 VITALS — DIASTOLIC BLOOD PRESSURE: 48 MMHG | SYSTOLIC BLOOD PRESSURE: 155 MMHG

## 2020-12-01 VITALS — DIASTOLIC BLOOD PRESSURE: 37 MMHG | SYSTOLIC BLOOD PRESSURE: 125 MMHG

## 2020-12-01 VITALS — SYSTOLIC BLOOD PRESSURE: 124 MMHG | DIASTOLIC BLOOD PRESSURE: 48 MMHG

## 2020-12-01 VITALS — SYSTOLIC BLOOD PRESSURE: 153 MMHG | DIASTOLIC BLOOD PRESSURE: 38 MMHG

## 2020-12-01 VITALS — DIASTOLIC BLOOD PRESSURE: 46 MMHG | SYSTOLIC BLOOD PRESSURE: 154 MMHG

## 2020-12-01 VITALS — SYSTOLIC BLOOD PRESSURE: 153 MMHG | DIASTOLIC BLOOD PRESSURE: 49 MMHG

## 2020-12-01 VITALS — DIASTOLIC BLOOD PRESSURE: 38 MMHG | SYSTOLIC BLOOD PRESSURE: 115 MMHG

## 2020-12-01 VITALS — DIASTOLIC BLOOD PRESSURE: 53 MMHG | SYSTOLIC BLOOD PRESSURE: 154 MMHG

## 2020-12-01 VITALS — SYSTOLIC BLOOD PRESSURE: 120 MMHG | DIASTOLIC BLOOD PRESSURE: 40 MMHG

## 2020-12-01 VITALS — SYSTOLIC BLOOD PRESSURE: 105 MMHG | DIASTOLIC BLOOD PRESSURE: 33 MMHG

## 2020-12-01 VITALS — DIASTOLIC BLOOD PRESSURE: 55 MMHG | SYSTOLIC BLOOD PRESSURE: 153 MMHG

## 2020-12-01 VITALS — SYSTOLIC BLOOD PRESSURE: 119 MMHG | DIASTOLIC BLOOD PRESSURE: 36 MMHG

## 2020-12-01 VITALS — DIASTOLIC BLOOD PRESSURE: 50 MMHG | SYSTOLIC BLOOD PRESSURE: 125 MMHG

## 2020-12-01 VITALS — SYSTOLIC BLOOD PRESSURE: 119 MMHG | DIASTOLIC BLOOD PRESSURE: 43 MMHG

## 2020-12-01 VITALS — DIASTOLIC BLOOD PRESSURE: 49 MMHG | SYSTOLIC BLOOD PRESSURE: 144 MMHG

## 2020-12-01 VITALS — SYSTOLIC BLOOD PRESSURE: 108 MMHG | DIASTOLIC BLOOD PRESSURE: 33 MMHG

## 2020-12-01 VITALS — DIASTOLIC BLOOD PRESSURE: 44 MMHG | SYSTOLIC BLOOD PRESSURE: 182 MMHG

## 2020-12-01 VITALS — DIASTOLIC BLOOD PRESSURE: 36 MMHG | SYSTOLIC BLOOD PRESSURE: 95 MMHG

## 2020-12-01 VITALS — SYSTOLIC BLOOD PRESSURE: 124 MMHG | DIASTOLIC BLOOD PRESSURE: 49 MMHG

## 2020-12-01 LAB
ALBUMIN SERPL-MCNC: 1.6 G/DL (ref 3.4–5)
ALT SERPL-CCNC: 33 U/L (ref 30–65)
ANION GAP SERPL CALC-SCNC: 9 MMOL/L (ref 7–16)
ANISOCYTOSIS BLD QL SMEAR: (no result)
AST SERPL-CCNC: 18 U/L (ref 15–37)
BASO STIPL BLD QL SMEAR: (no result)
BE(VIVO): 4.4 MMOL/L
BILIRUB SERPL-MCNC: 0.3 MG/DL (ref 0.2–1)
BUN SERPL-MCNC: 56 MG/DL (ref 7–18)
CALCIUM SERPL-MCNC: 9.4 MG/DL (ref 8.5–10.1)
CHLORIDE SERPL-SCNC: 107 MMOL/L (ref 98–107)
CO2 SERPL-SCNC: 30 MMOL/L (ref 21–32)
CREAT SERPL-MCNC: 0.5 MG/DL (ref 0.6–1)
ERYTHROCYTE [DISTWIDTH] IN BLOOD BY AUTOMATED COUNT: 20.3 % (ref 10.5–14.5)
GLUCOSE SERPL-MCNC: 225 MG/DL (ref 74–106)
GRANULOCYTES NFR BLD MANUAL: 88 % (ref 36–66)
HCO3 BLD-SCNC: 31 MMOL/L (ref 22–26)
HCT VFR BLD CALC: 29.4 % (ref 37–47)
HGB BLD-MCNC: 9.2 GM/DL (ref 12–15)
LG PLATELETS BLD QL SMEAR: (no result)
LYMPHOCYTES NFR BLD AUTO: 3 % (ref 24–44)
MAGNESIUM SERPL-MCNC: 2.2 MG/DL (ref 1.8–2.4)
MCH RBC QN AUTO: 26.9 PG (ref 26–34)
MCHC RBC AUTO-ENTMCNC: 31.4 G/DL (ref 28–37)
MCV RBC: 85.5 FL (ref 80–100)
MONOCYTES NFR BLD: 3 % (ref 1–8)
NEUTROPHILS # BLD: 18.4 THOU/UL (ref 1.4–8.2)
NEUTS BAND NFR BLD: 6 % (ref 0–8)
PCO2 BLD: 56.9 MMHG (ref 35–45)
PLATELET # BLD EST: (no result) 10*3/UL
PLATELET # BLD: 81 THOU/UL (ref 150–400)
PO2 BLD: 53.2 MMHG (ref 80–100)
POIKILOCYTOSIS BLD QL SMEAR: (no result)
POLYCHROMASIA BLD QL SMEAR: (no result)
POTASSIUM SERPL-SCNC: 4.1 MMOL/L (ref 3.5–5.1)
PROT SERPL-MCNC: 5.4 G/DL (ref 6.4–8.2)
RBC # BLD AUTO: 3.43 MIL/UL (ref 4.2–5)
SODIUM SERPL-SCNC: 146 MMOL/L (ref 136–145)
WBC # BLD AUTO: 19.6 THOU/UL (ref 4–11)

## 2020-12-01 NOTE — NUR
SEDATION DECREASED, PT OPENS EYES TO VOICE, BUT STILL DOES NOT FOLLOW
COMMANDS OR MOVE EXTREMITIES. PT TOLERATING TF WITHOUT PROBLEMS. MODERATE BM
TODAY. STILL REQUIRING HIGH FIO2 ON VENTILATOR. CONTINUING TO FOLLOW PLAN OF
CARE.

## 2020-12-01 NOTE — NUR
REMAINS INTUBATED AND SEDATED WITH PROPOFOL  PRECEDEX AND FENTANYL
FOLLOWS NO COMMANDS. EXTREMELY EDEMATOUS. 800 CC UO THIS SHIFT.  LUNGS
COARSE BILAT.  NOT PROGRESSING TOWARD GOALS

## 2020-12-02 VITALS — SYSTOLIC BLOOD PRESSURE: 186 MMHG | DIASTOLIC BLOOD PRESSURE: 67 MMHG

## 2020-12-02 VITALS — SYSTOLIC BLOOD PRESSURE: 124 MMHG | DIASTOLIC BLOOD PRESSURE: 43 MMHG

## 2020-12-02 VITALS — SYSTOLIC BLOOD PRESSURE: 123 MMHG | DIASTOLIC BLOOD PRESSURE: 37 MMHG

## 2020-12-02 VITALS — SYSTOLIC BLOOD PRESSURE: 123 MMHG | DIASTOLIC BLOOD PRESSURE: 41 MMHG

## 2020-12-02 VITALS — SYSTOLIC BLOOD PRESSURE: 148 MMHG | DIASTOLIC BLOOD PRESSURE: 47 MMHG

## 2020-12-02 VITALS — SYSTOLIC BLOOD PRESSURE: 140 MMHG | DIASTOLIC BLOOD PRESSURE: 50 MMHG

## 2020-12-02 VITALS — SYSTOLIC BLOOD PRESSURE: 140 MMHG | DIASTOLIC BLOOD PRESSURE: 46 MMHG

## 2020-12-02 VITALS — DIASTOLIC BLOOD PRESSURE: 35 MMHG | SYSTOLIC BLOOD PRESSURE: 99 MMHG

## 2020-12-02 VITALS — SYSTOLIC BLOOD PRESSURE: 116 MMHG | DIASTOLIC BLOOD PRESSURE: 42 MMHG

## 2020-12-02 VITALS — SYSTOLIC BLOOD PRESSURE: 98 MMHG | DIASTOLIC BLOOD PRESSURE: 38 MMHG

## 2020-12-02 VITALS — DIASTOLIC BLOOD PRESSURE: 52 MMHG | SYSTOLIC BLOOD PRESSURE: 136 MMHG

## 2020-12-02 VITALS — DIASTOLIC BLOOD PRESSURE: 39 MMHG | SYSTOLIC BLOOD PRESSURE: 111 MMHG

## 2020-12-02 VITALS — DIASTOLIC BLOOD PRESSURE: 47 MMHG | SYSTOLIC BLOOD PRESSURE: 145 MMHG

## 2020-12-02 VITALS — DIASTOLIC BLOOD PRESSURE: 43 MMHG | SYSTOLIC BLOOD PRESSURE: 140 MMHG

## 2020-12-02 VITALS — DIASTOLIC BLOOD PRESSURE: 37 MMHG | SYSTOLIC BLOOD PRESSURE: 117 MMHG

## 2020-12-02 VITALS — SYSTOLIC BLOOD PRESSURE: 105 MMHG | DIASTOLIC BLOOD PRESSURE: 38 MMHG

## 2020-12-02 VITALS — DIASTOLIC BLOOD PRESSURE: 32 MMHG | SYSTOLIC BLOOD PRESSURE: 87 MMHG

## 2020-12-02 VITALS — DIASTOLIC BLOOD PRESSURE: 65 MMHG | SYSTOLIC BLOOD PRESSURE: 176 MMHG

## 2020-12-02 VITALS — SYSTOLIC BLOOD PRESSURE: 123 MMHG | DIASTOLIC BLOOD PRESSURE: 51 MMHG

## 2020-12-02 VITALS — SYSTOLIC BLOOD PRESSURE: 111 MMHG | DIASTOLIC BLOOD PRESSURE: 45 MMHG

## 2020-12-02 VITALS — DIASTOLIC BLOOD PRESSURE: 48 MMHG | SYSTOLIC BLOOD PRESSURE: 124 MMHG

## 2020-12-02 VITALS — DIASTOLIC BLOOD PRESSURE: 49 MMHG | SYSTOLIC BLOOD PRESSURE: 139 MMHG

## 2020-12-02 VITALS — SYSTOLIC BLOOD PRESSURE: 111 MMHG | DIASTOLIC BLOOD PRESSURE: 42 MMHG

## 2020-12-02 VITALS — SYSTOLIC BLOOD PRESSURE: 122 MMHG | DIASTOLIC BLOOD PRESSURE: 38 MMHG

## 2020-12-02 VITALS — SYSTOLIC BLOOD PRESSURE: 100 MMHG | DIASTOLIC BLOOD PRESSURE: 37 MMHG

## 2020-12-02 VITALS — DIASTOLIC BLOOD PRESSURE: 41 MMHG | SYSTOLIC BLOOD PRESSURE: 126 MMHG

## 2020-12-02 VITALS — DIASTOLIC BLOOD PRESSURE: 47 MMHG | SYSTOLIC BLOOD PRESSURE: 129 MMHG

## 2020-12-02 VITALS — DIASTOLIC BLOOD PRESSURE: 51 MMHG | SYSTOLIC BLOOD PRESSURE: 137 MMHG

## 2020-12-02 VITALS — DIASTOLIC BLOOD PRESSURE: 39 MMHG | SYSTOLIC BLOOD PRESSURE: 145 MMHG

## 2020-12-02 VITALS — SYSTOLIC BLOOD PRESSURE: 125 MMHG | DIASTOLIC BLOOD PRESSURE: 43 MMHG

## 2020-12-02 VITALS — SYSTOLIC BLOOD PRESSURE: 113 MMHG | DIASTOLIC BLOOD PRESSURE: 41 MMHG

## 2020-12-02 VITALS — SYSTOLIC BLOOD PRESSURE: 133 MMHG | DIASTOLIC BLOOD PRESSURE: 47 MMHG

## 2020-12-02 VITALS — DIASTOLIC BLOOD PRESSURE: 40 MMHG | SYSTOLIC BLOOD PRESSURE: 135 MMHG

## 2020-12-02 VITALS — DIASTOLIC BLOOD PRESSURE: 38 MMHG | SYSTOLIC BLOOD PRESSURE: 139 MMHG

## 2020-12-02 VITALS — DIASTOLIC BLOOD PRESSURE: 42 MMHG | SYSTOLIC BLOOD PRESSURE: 123 MMHG

## 2020-12-02 VITALS — DIASTOLIC BLOOD PRESSURE: 49 MMHG | SYSTOLIC BLOOD PRESSURE: 131 MMHG

## 2020-12-02 VITALS — SYSTOLIC BLOOD PRESSURE: 116 MMHG | DIASTOLIC BLOOD PRESSURE: 44 MMHG

## 2020-12-02 VITALS — SYSTOLIC BLOOD PRESSURE: 155 MMHG | DIASTOLIC BLOOD PRESSURE: 50 MMHG

## 2020-12-02 VITALS — SYSTOLIC BLOOD PRESSURE: 110 MMHG | DIASTOLIC BLOOD PRESSURE: 44 MMHG

## 2020-12-02 VITALS — SYSTOLIC BLOOD PRESSURE: 113 MMHG | DIASTOLIC BLOOD PRESSURE: 36 MMHG

## 2020-12-02 VITALS — DIASTOLIC BLOOD PRESSURE: 46 MMHG | SYSTOLIC BLOOD PRESSURE: 119 MMHG

## 2020-12-02 VITALS — DIASTOLIC BLOOD PRESSURE: 47 MMHG | SYSTOLIC BLOOD PRESSURE: 116 MMHG

## 2020-12-02 VITALS — DIASTOLIC BLOOD PRESSURE: 41 MMHG | SYSTOLIC BLOOD PRESSURE: 117 MMHG

## 2020-12-02 VITALS — DIASTOLIC BLOOD PRESSURE: 42 MMHG | SYSTOLIC BLOOD PRESSURE: 111 MMHG

## 2020-12-02 VITALS — DIASTOLIC BLOOD PRESSURE: 42 MMHG | SYSTOLIC BLOOD PRESSURE: 132 MMHG

## 2020-12-02 VITALS — SYSTOLIC BLOOD PRESSURE: 129 MMHG | DIASTOLIC BLOOD PRESSURE: 39 MMHG

## 2020-12-02 VITALS — DIASTOLIC BLOOD PRESSURE: 49 MMHG | SYSTOLIC BLOOD PRESSURE: 130 MMHG

## 2020-12-02 VITALS — DIASTOLIC BLOOD PRESSURE: 34 MMHG | SYSTOLIC BLOOD PRESSURE: 95 MMHG

## 2020-12-02 VITALS — DIASTOLIC BLOOD PRESSURE: 38 MMHG | SYSTOLIC BLOOD PRESSURE: 141 MMHG

## 2020-12-02 VITALS — DIASTOLIC BLOOD PRESSURE: 46 MMHG | SYSTOLIC BLOOD PRESSURE: 135 MMHG

## 2020-12-02 VITALS — SYSTOLIC BLOOD PRESSURE: 123 MMHG | DIASTOLIC BLOOD PRESSURE: 39 MMHG

## 2020-12-02 VITALS — DIASTOLIC BLOOD PRESSURE: 43 MMHG | SYSTOLIC BLOOD PRESSURE: 128 MMHG

## 2020-12-02 VITALS — SYSTOLIC BLOOD PRESSURE: 133 MMHG | DIASTOLIC BLOOD PRESSURE: 46 MMHG

## 2020-12-02 VITALS — SYSTOLIC BLOOD PRESSURE: 148 MMHG | DIASTOLIC BLOOD PRESSURE: 46 MMHG

## 2020-12-02 VITALS — SYSTOLIC BLOOD PRESSURE: 106 MMHG | DIASTOLIC BLOOD PRESSURE: 37 MMHG

## 2020-12-02 VITALS — DIASTOLIC BLOOD PRESSURE: 41 MMHG | SYSTOLIC BLOOD PRESSURE: 107 MMHG

## 2020-12-02 VITALS — SYSTOLIC BLOOD PRESSURE: 121 MMHG | DIASTOLIC BLOOD PRESSURE: 46 MMHG

## 2020-12-02 VITALS — DIASTOLIC BLOOD PRESSURE: 34 MMHG | SYSTOLIC BLOOD PRESSURE: 121 MMHG

## 2020-12-02 VITALS — SYSTOLIC BLOOD PRESSURE: 125 MMHG | DIASTOLIC BLOOD PRESSURE: 49 MMHG

## 2020-12-02 VITALS — DIASTOLIC BLOOD PRESSURE: 45 MMHG | SYSTOLIC BLOOD PRESSURE: 99 MMHG

## 2020-12-02 VITALS — SYSTOLIC BLOOD PRESSURE: 143 MMHG | DIASTOLIC BLOOD PRESSURE: 47 MMHG

## 2020-12-02 VITALS — SYSTOLIC BLOOD PRESSURE: 121 MMHG | DIASTOLIC BLOOD PRESSURE: 39 MMHG

## 2020-12-02 VITALS — SYSTOLIC BLOOD PRESSURE: 112 MMHG | DIASTOLIC BLOOD PRESSURE: 43 MMHG

## 2020-12-02 VITALS — SYSTOLIC BLOOD PRESSURE: 127 MMHG | DIASTOLIC BLOOD PRESSURE: 42 MMHG

## 2020-12-02 VITALS — SYSTOLIC BLOOD PRESSURE: 119 MMHG | DIASTOLIC BLOOD PRESSURE: 45 MMHG

## 2020-12-02 VITALS — DIASTOLIC BLOOD PRESSURE: 35 MMHG | SYSTOLIC BLOOD PRESSURE: 110 MMHG

## 2020-12-02 VITALS — DIASTOLIC BLOOD PRESSURE: 39 MMHG | SYSTOLIC BLOOD PRESSURE: 128 MMHG

## 2020-12-02 VITALS — DIASTOLIC BLOOD PRESSURE: 39 MMHG | SYSTOLIC BLOOD PRESSURE: 119 MMHG

## 2020-12-02 VITALS — SYSTOLIC BLOOD PRESSURE: 122 MMHG | DIASTOLIC BLOOD PRESSURE: 40 MMHG

## 2020-12-02 VITALS — DIASTOLIC BLOOD PRESSURE: 45 MMHG | SYSTOLIC BLOOD PRESSURE: 128 MMHG

## 2020-12-02 VITALS — SYSTOLIC BLOOD PRESSURE: 117 MMHG | DIASTOLIC BLOOD PRESSURE: 37 MMHG

## 2020-12-02 VITALS — DIASTOLIC BLOOD PRESSURE: 36 MMHG | SYSTOLIC BLOOD PRESSURE: 106 MMHG

## 2020-12-02 VITALS — SYSTOLIC BLOOD PRESSURE: 117 MMHG | DIASTOLIC BLOOD PRESSURE: 38 MMHG

## 2020-12-02 VITALS — DIASTOLIC BLOOD PRESSURE: 39 MMHG | SYSTOLIC BLOOD PRESSURE: 123 MMHG

## 2020-12-02 LAB
ALBUMIN SERPL-MCNC: 1.5 G/DL (ref 3.4–5)
ALT SERPL-CCNC: 34 U/L (ref 30–65)
ANION GAP SERPL CALC-SCNC: 10 MMOL/L (ref 7–16)
ANISOCYTOSIS BLD QL SMEAR: (no result)
AST SERPL-CCNC: 19 U/L (ref 15–37)
BASO STIPL BLD QL SMEAR: (no result)
BASOPHILS NFR BLD AUTO: 0 % (ref 0–2)
BILIRUB SERPL-MCNC: 0.3 MG/DL (ref 0.2–1)
BUN SERPL-MCNC: 64 MG/DL (ref 7–18)
CALCIUM SERPL-MCNC: 9.6 MG/DL (ref 8.5–10.1)
CHLORIDE SERPL-SCNC: 107 MMOL/L (ref 98–107)
CO2 SERPL-SCNC: 29 MMOL/L (ref 21–32)
CREAT SERPL-MCNC: 0.6 MG/DL (ref 0.6–1)
EOSINOPHIL NFR BLD: 0 % (ref 0–3)
ERYTHROCYTE [DISTWIDTH] IN BLOOD BY AUTOMATED COUNT: 19.8 % (ref 10.5–14.5)
GLUCOSE SERPL-MCNC: 164 MG/DL (ref 74–106)
GRANULOCYTES NFR BLD MANUAL: 89 % (ref 36–66)
HCT VFR BLD CALC: 27.6 % (ref 37–47)
HGB BLD-MCNC: 8.5 GM/DL (ref 12–15)
LG PLATELETS BLD QL SMEAR: (no result)
LYMPHOCYTES NFR BLD AUTO: 2 % (ref 24–44)
MAGNESIUM SERPL-MCNC: 2.1 MG/DL (ref 1.8–2.4)
MCH RBC QN AUTO: 26.8 PG (ref 26–34)
MCHC RBC AUTO-ENTMCNC: 30.9 G/DL (ref 28–37)
MCV RBC: 86.5 FL (ref 80–100)
METAMYELOCYTES NFR BLD: 1 %
MONOCYTES NFR BLD: 2 % (ref 1–8)
MYELOCYTES NFR BLD: 3 %
NEUTROPHILS # BLD: 19.4 THOU/UL (ref 1.4–8.2)
NEUTS BAND NFR BLD: 3 % (ref 0–8)
OVALOCYTES BLD QL SMEAR: (no result)
PLATELET # BLD EST: (no result) 10*3/UL
PLATELET # BLD: 83 THOU/UL (ref 150–400)
POIKILOCYTOSIS BLD QL SMEAR: SLIGHT
POTASSIUM SERPL-SCNC: 3.9 MMOL/L (ref 3.5–5.1)
PROT SERPL-MCNC: 5.1 G/DL (ref 6.4–8.2)
RBC # BLD AUTO: 3.19 MIL/UL (ref 4.2–5)
SODIUM SERPL-SCNC: 146 MMOL/L (ref 136–145)
WBC # BLD AUTO: 21.1 THOU/UL (ref 4–11)

## 2020-12-02 NOTE — NUR
PATIENT NOT PROGRESSING TOWARDS DISMISSAL GOALS. PATIENT CONTINUES ON 90% FIO2
ON VENTILATOR. PRESSURE SUPPORT MODE. PRECEDEX, PROPOFOL AND FENTANYL FOR
SEDATION. NO BM. CHEST TUBE WITH AIR LEAK PRESENT. (NOT NEW) QIU WITH
ADEQUATE URINE OUT PUT. NO CALLS FROM FAMILY TODAY ABOUT WANTING PATIENT
UPDATES. Rendering Text In Billing: The  specimen was grossed and processed into a slide.

## 2020-12-02 NOTE — NUR
ASSUMED CARE OF PATIENT AT 0600.  IN THE AM PATIENT HAD CONSITENT VT OVER 500-
AT SECOND ROUND OF VENTILATOR CHECKS PATIENT FOUND TO HAVE CONSITENT VT UNDER
320. PER DR STANLEY HE WANTED THE VT INCREASED.  PATIENT ON PRESSURE CONTROL.  I
TRIED OTHER MODES OF VENTILATION AND VT WAS UNABLE TO BE RAISED EVEN WITH A
SET VOLUME TO ACHIEVE OVER 400.  SEDATION IS LOWER AND PATIENT APPEARS TO BE
GUPPY BREATHING- I REQUESTED THAT WE TRY SOME ADDITIONAL SEDATION TO AID IN
THE RESPIRATIONS OF THE PATIENT.

## 2020-12-03 VITALS — SYSTOLIC BLOOD PRESSURE: 128 MMHG | DIASTOLIC BLOOD PRESSURE: 43 MMHG

## 2020-12-03 VITALS — SYSTOLIC BLOOD PRESSURE: 141 MMHG | DIASTOLIC BLOOD PRESSURE: 46 MMHG

## 2020-12-03 VITALS — DIASTOLIC BLOOD PRESSURE: 46 MMHG | SYSTOLIC BLOOD PRESSURE: 135 MMHG

## 2020-12-03 VITALS — DIASTOLIC BLOOD PRESSURE: 65 MMHG | SYSTOLIC BLOOD PRESSURE: 160 MMHG

## 2020-12-03 VITALS — DIASTOLIC BLOOD PRESSURE: 44 MMHG | SYSTOLIC BLOOD PRESSURE: 129 MMHG

## 2020-12-03 VITALS — SYSTOLIC BLOOD PRESSURE: 88 MMHG | DIASTOLIC BLOOD PRESSURE: 32 MMHG

## 2020-12-03 VITALS — DIASTOLIC BLOOD PRESSURE: 42 MMHG | SYSTOLIC BLOOD PRESSURE: 130 MMHG

## 2020-12-03 VITALS — DIASTOLIC BLOOD PRESSURE: 47 MMHG | SYSTOLIC BLOOD PRESSURE: 145 MMHG

## 2020-12-03 VITALS — SYSTOLIC BLOOD PRESSURE: 141 MMHG | DIASTOLIC BLOOD PRESSURE: 44 MMHG

## 2020-12-03 VITALS — SYSTOLIC BLOOD PRESSURE: 89 MMHG | DIASTOLIC BLOOD PRESSURE: 31 MMHG

## 2020-12-03 VITALS — SYSTOLIC BLOOD PRESSURE: 111 MMHG | DIASTOLIC BLOOD PRESSURE: 39 MMHG

## 2020-12-03 VITALS — DIASTOLIC BLOOD PRESSURE: 43 MMHG | SYSTOLIC BLOOD PRESSURE: 115 MMHG

## 2020-12-03 VITALS — SYSTOLIC BLOOD PRESSURE: 139 MMHG | DIASTOLIC BLOOD PRESSURE: 42 MMHG

## 2020-12-03 VITALS — DIASTOLIC BLOOD PRESSURE: 47 MMHG | SYSTOLIC BLOOD PRESSURE: 149 MMHG

## 2020-12-03 VITALS — DIASTOLIC BLOOD PRESSURE: 40 MMHG | SYSTOLIC BLOOD PRESSURE: 137 MMHG

## 2020-12-03 VITALS — DIASTOLIC BLOOD PRESSURE: 51 MMHG | SYSTOLIC BLOOD PRESSURE: 147 MMHG

## 2020-12-03 VITALS — DIASTOLIC BLOOD PRESSURE: 50 MMHG | SYSTOLIC BLOOD PRESSURE: 166 MMHG

## 2020-12-03 VITALS — DIASTOLIC BLOOD PRESSURE: 42 MMHG | SYSTOLIC BLOOD PRESSURE: 129 MMHG

## 2020-12-03 VITALS — SYSTOLIC BLOOD PRESSURE: 171 MMHG | DIASTOLIC BLOOD PRESSURE: 54 MMHG

## 2020-12-03 VITALS — DIASTOLIC BLOOD PRESSURE: 77 MMHG | SYSTOLIC BLOOD PRESSURE: 188 MMHG

## 2020-12-03 VITALS — SYSTOLIC BLOOD PRESSURE: 76 MMHG | DIASTOLIC BLOOD PRESSURE: 30 MMHG

## 2020-12-03 VITALS — DIASTOLIC BLOOD PRESSURE: 52 MMHG | SYSTOLIC BLOOD PRESSURE: 142 MMHG

## 2020-12-03 VITALS — SYSTOLIC BLOOD PRESSURE: 120 MMHG | DIASTOLIC BLOOD PRESSURE: 44 MMHG

## 2020-12-03 VITALS — SYSTOLIC BLOOD PRESSURE: 136 MMHG | DIASTOLIC BLOOD PRESSURE: 47 MMHG

## 2020-12-03 VITALS — DIASTOLIC BLOOD PRESSURE: 43 MMHG | SYSTOLIC BLOOD PRESSURE: 148 MMHG

## 2020-12-03 VITALS — DIASTOLIC BLOOD PRESSURE: 43 MMHG | SYSTOLIC BLOOD PRESSURE: 144 MMHG

## 2020-12-03 VITALS — DIASTOLIC BLOOD PRESSURE: 56 MMHG | SYSTOLIC BLOOD PRESSURE: 162 MMHG

## 2020-12-03 VITALS — DIASTOLIC BLOOD PRESSURE: 43 MMHG | SYSTOLIC BLOOD PRESSURE: 130 MMHG

## 2020-12-03 VITALS — SYSTOLIC BLOOD PRESSURE: 119 MMHG | DIASTOLIC BLOOD PRESSURE: 42 MMHG

## 2020-12-03 VITALS — SYSTOLIC BLOOD PRESSURE: 116 MMHG | DIASTOLIC BLOOD PRESSURE: 44 MMHG

## 2020-12-03 VITALS — SYSTOLIC BLOOD PRESSURE: 128 MMHG | DIASTOLIC BLOOD PRESSURE: 41 MMHG

## 2020-12-03 VITALS — DIASTOLIC BLOOD PRESSURE: 34 MMHG | SYSTOLIC BLOOD PRESSURE: 70 MMHG

## 2020-12-03 VITALS — DIASTOLIC BLOOD PRESSURE: 75 MMHG | SYSTOLIC BLOOD PRESSURE: 128 MMHG

## 2020-12-03 VITALS — SYSTOLIC BLOOD PRESSURE: 118 MMHG | DIASTOLIC BLOOD PRESSURE: 40 MMHG

## 2020-12-03 VITALS — SYSTOLIC BLOOD PRESSURE: 119 MMHG | DIASTOLIC BLOOD PRESSURE: 43 MMHG

## 2020-12-03 VITALS — DIASTOLIC BLOOD PRESSURE: 50 MMHG | SYSTOLIC BLOOD PRESSURE: 145 MMHG

## 2020-12-03 VITALS — SYSTOLIC BLOOD PRESSURE: 153 MMHG | DIASTOLIC BLOOD PRESSURE: 50 MMHG

## 2020-12-03 VITALS — SYSTOLIC BLOOD PRESSURE: 126 MMHG | DIASTOLIC BLOOD PRESSURE: 43 MMHG

## 2020-12-03 VITALS — DIASTOLIC BLOOD PRESSURE: 41 MMHG | SYSTOLIC BLOOD PRESSURE: 139 MMHG

## 2020-12-03 VITALS — DIASTOLIC BLOOD PRESSURE: 45 MMHG | SYSTOLIC BLOOD PRESSURE: 130 MMHG

## 2020-12-03 VITALS — DIASTOLIC BLOOD PRESSURE: 41 MMHG | SYSTOLIC BLOOD PRESSURE: 112 MMHG

## 2020-12-03 VITALS — SYSTOLIC BLOOD PRESSURE: 111 MMHG | DIASTOLIC BLOOD PRESSURE: 41 MMHG

## 2020-12-03 VITALS — SYSTOLIC BLOOD PRESSURE: 94 MMHG | DIASTOLIC BLOOD PRESSURE: 38 MMHG

## 2020-12-03 VITALS — SYSTOLIC BLOOD PRESSURE: 126 MMHG | DIASTOLIC BLOOD PRESSURE: 44 MMHG

## 2020-12-03 VITALS — DIASTOLIC BLOOD PRESSURE: 41 MMHG | SYSTOLIC BLOOD PRESSURE: 124 MMHG

## 2020-12-03 VITALS — SYSTOLIC BLOOD PRESSURE: 130 MMHG | DIASTOLIC BLOOD PRESSURE: 46 MMHG

## 2020-12-03 VITALS — SYSTOLIC BLOOD PRESSURE: 134 MMHG | DIASTOLIC BLOOD PRESSURE: 41 MMHG

## 2020-12-03 VITALS — DIASTOLIC BLOOD PRESSURE: 49 MMHG | SYSTOLIC BLOOD PRESSURE: 125 MMHG

## 2020-12-03 VITALS — SYSTOLIC BLOOD PRESSURE: 170 MMHG | DIASTOLIC BLOOD PRESSURE: 53 MMHG

## 2020-12-03 VITALS — SYSTOLIC BLOOD PRESSURE: 150 MMHG | DIASTOLIC BLOOD PRESSURE: 54 MMHG

## 2020-12-03 VITALS — SYSTOLIC BLOOD PRESSURE: 148 MMHG | DIASTOLIC BLOOD PRESSURE: 48 MMHG

## 2020-12-03 VITALS — SYSTOLIC BLOOD PRESSURE: 131 MMHG | DIASTOLIC BLOOD PRESSURE: 48 MMHG

## 2020-12-03 VITALS — SYSTOLIC BLOOD PRESSURE: 135 MMHG | DIASTOLIC BLOOD PRESSURE: 44 MMHG

## 2020-12-03 VITALS — DIASTOLIC BLOOD PRESSURE: 44 MMHG | SYSTOLIC BLOOD PRESSURE: 123 MMHG

## 2020-12-03 VITALS — SYSTOLIC BLOOD PRESSURE: 165 MMHG | DIASTOLIC BLOOD PRESSURE: 51 MMHG

## 2020-12-03 VITALS — DIASTOLIC BLOOD PRESSURE: 39 MMHG | SYSTOLIC BLOOD PRESSURE: 105 MMHG

## 2020-12-03 VITALS — DIASTOLIC BLOOD PRESSURE: 62 MMHG | SYSTOLIC BLOOD PRESSURE: 161 MMHG

## 2020-12-03 VITALS — DIASTOLIC BLOOD PRESSURE: 35 MMHG | SYSTOLIC BLOOD PRESSURE: 80 MMHG

## 2020-12-03 VITALS — DIASTOLIC BLOOD PRESSURE: 39 MMHG | SYSTOLIC BLOOD PRESSURE: 102 MMHG

## 2020-12-03 VITALS — SYSTOLIC BLOOD PRESSURE: 105 MMHG | DIASTOLIC BLOOD PRESSURE: 37 MMHG

## 2020-12-03 VITALS — SYSTOLIC BLOOD PRESSURE: 133 MMHG | DIASTOLIC BLOOD PRESSURE: 41 MMHG

## 2020-12-03 VITALS — DIASTOLIC BLOOD PRESSURE: 39 MMHG | SYSTOLIC BLOOD PRESSURE: 114 MMHG

## 2020-12-03 VITALS — DIASTOLIC BLOOD PRESSURE: 38 MMHG | SYSTOLIC BLOOD PRESSURE: 110 MMHG

## 2020-12-03 VITALS — DIASTOLIC BLOOD PRESSURE: 43 MMHG | SYSTOLIC BLOOD PRESSURE: 141 MMHG

## 2020-12-03 VITALS — SYSTOLIC BLOOD PRESSURE: 122 MMHG | DIASTOLIC BLOOD PRESSURE: 36 MMHG

## 2020-12-03 VITALS — SYSTOLIC BLOOD PRESSURE: 115 MMHG | DIASTOLIC BLOOD PRESSURE: 42 MMHG

## 2020-12-03 VITALS — SYSTOLIC BLOOD PRESSURE: 131 MMHG | DIASTOLIC BLOOD PRESSURE: 44 MMHG

## 2020-12-03 VITALS — SYSTOLIC BLOOD PRESSURE: 107 MMHG | DIASTOLIC BLOOD PRESSURE: 41 MMHG

## 2020-12-03 VITALS — DIASTOLIC BLOOD PRESSURE: 47 MMHG | SYSTOLIC BLOOD PRESSURE: 143 MMHG

## 2020-12-03 VITALS — SYSTOLIC BLOOD PRESSURE: 153 MMHG | DIASTOLIC BLOOD PRESSURE: 52 MMHG

## 2020-12-03 VITALS — SYSTOLIC BLOOD PRESSURE: 150 MMHG | DIASTOLIC BLOOD PRESSURE: 45 MMHG

## 2020-12-03 VITALS — SYSTOLIC BLOOD PRESSURE: 171 MMHG | DIASTOLIC BLOOD PRESSURE: 52 MMHG

## 2020-12-03 VITALS — SYSTOLIC BLOOD PRESSURE: 125 MMHG | DIASTOLIC BLOOD PRESSURE: 42 MMHG

## 2020-12-03 VITALS — DIASTOLIC BLOOD PRESSURE: 69 MMHG | SYSTOLIC BLOOD PRESSURE: 167 MMHG

## 2020-12-03 VITALS — DIASTOLIC BLOOD PRESSURE: 43 MMHG | SYSTOLIC BLOOD PRESSURE: 131 MMHG

## 2020-12-03 VITALS — SYSTOLIC BLOOD PRESSURE: 112 MMHG | DIASTOLIC BLOOD PRESSURE: 40 MMHG

## 2020-12-03 VITALS — DIASTOLIC BLOOD PRESSURE: 54 MMHG | SYSTOLIC BLOOD PRESSURE: 169 MMHG

## 2020-12-03 VITALS — SYSTOLIC BLOOD PRESSURE: 145 MMHG | DIASTOLIC BLOOD PRESSURE: 44 MMHG

## 2020-12-03 VITALS — DIASTOLIC BLOOD PRESSURE: 40 MMHG | SYSTOLIC BLOOD PRESSURE: 97 MMHG

## 2020-12-03 VITALS — SYSTOLIC BLOOD PRESSURE: 149 MMHG | DIASTOLIC BLOOD PRESSURE: 47 MMHG

## 2020-12-03 VITALS — DIASTOLIC BLOOD PRESSURE: 51 MMHG | SYSTOLIC BLOOD PRESSURE: 145 MMHG

## 2020-12-03 VITALS — DIASTOLIC BLOOD PRESSURE: 39 MMHG | SYSTOLIC BLOOD PRESSURE: 107 MMHG

## 2020-12-03 VITALS — DIASTOLIC BLOOD PRESSURE: 45 MMHG | SYSTOLIC BLOOD PRESSURE: 162 MMHG

## 2020-12-03 LAB
ALBUMIN SERPL-MCNC: 1.6 G/DL (ref 3.4–5)
ALT SERPL-CCNC: 40 U/L (ref 30–65)
ANION GAP SERPL CALC-SCNC: 9 MMOL/L (ref 7–16)
ANISOCYTOSIS BLD QL SMEAR: (no result)
AST SERPL-CCNC: 27 U/L (ref 15–37)
BASOPHILS NFR BLD AUTO: 0 % (ref 0–2)
BE(VIVO): 2.5 MMOL/L
BILIRUB SERPL-MCNC: 0.3 MG/DL (ref 0.2–1)
BUN SERPL-MCNC: 64 MG/DL (ref 7–18)
CALCIUM SERPL-MCNC: 9.3 MG/DL (ref 8.5–10.1)
CHLORIDE SERPL-SCNC: 107 MMOL/L (ref 98–107)
CO2 SERPL-SCNC: 30 MMOL/L (ref 21–32)
CREAT SERPL-MCNC: 0.6 MG/DL (ref 0.6–1)
EOSINOPHIL NFR BLD: 0 % (ref 0–3)
ERYTHROCYTE [DISTWIDTH] IN BLOOD BY AUTOMATED COUNT: 20.6 % (ref 10.5–14.5)
GLUCOSE SERPL-MCNC: 258 MG/DL (ref 74–106)
GRANULOCYTES NFR BLD MANUAL: 94 % (ref 36–66)
HCO3 BLD-SCNC: 28.1 MMOL/L (ref 22–26)
HCT VFR BLD CALC: 26.6 % (ref 37–47)
HGB BLD-MCNC: 8.3 GM/DL (ref 12–15)
LYMPHOCYTES NFR BLD AUTO: 2 % (ref 24–44)
MCH RBC QN AUTO: 26.9 PG (ref 26–34)
MCHC RBC AUTO-ENTMCNC: 31.1 G/DL (ref 28–37)
MCV RBC: 86.6 FL (ref 80–100)
MONOCYTES NFR BLD: 1 % (ref 1–8)
MYELOCYTES NFR BLD: 1 %
NEUTROPHILS # BLD: 19.9 THOU/UL (ref 1.4–8.2)
NEUTS BAND NFR BLD: 2 % (ref 0–8)
PCO2 BLD: 48.6 MMHG (ref 35–45)
PLATELET # BLD: 75 THOU/UL (ref 150–400)
PO2 BLD: 71.2 MMHG (ref 80–100)
POTASSIUM SERPL-SCNC: 4.7 MMOL/L (ref 3.5–5.1)
PROT SERPL-MCNC: 5.4 G/DL (ref 6.4–8.2)
RBC # BLD AUTO: 3.08 MIL/UL (ref 4.2–5)
SODIUM SERPL-SCNC: 146 MMOL/L (ref 136–145)
WBC # BLD AUTO: 20.7 THOU/UL (ref 4–11)

## 2020-12-03 NOTE — NUR
nonresponsive, unable to perform sedation vacation due to ventilator
settings, slight increases in sedation to keep pt resp unlabored and tv in
500's. large amount thick maroon bloody drainage from ett in am, then small
amount in afternoon. no inquiries from family. no pt progress.

## 2020-12-04 VITALS — DIASTOLIC BLOOD PRESSURE: 58 MMHG | SYSTOLIC BLOOD PRESSURE: 189 MMHG

## 2020-12-04 VITALS — SYSTOLIC BLOOD PRESSURE: 179 MMHG | DIASTOLIC BLOOD PRESSURE: 60 MMHG

## 2020-12-04 VITALS — DIASTOLIC BLOOD PRESSURE: 53 MMHG | SYSTOLIC BLOOD PRESSURE: 110 MMHG

## 2020-12-04 VITALS — DIASTOLIC BLOOD PRESSURE: 47 MMHG | SYSTOLIC BLOOD PRESSURE: 146 MMHG

## 2020-12-04 VITALS — DIASTOLIC BLOOD PRESSURE: 51 MMHG | SYSTOLIC BLOOD PRESSURE: 138 MMHG

## 2020-12-04 VITALS — DIASTOLIC BLOOD PRESSURE: 62 MMHG | SYSTOLIC BLOOD PRESSURE: 166 MMHG

## 2020-12-04 VITALS — SYSTOLIC BLOOD PRESSURE: 155 MMHG | DIASTOLIC BLOOD PRESSURE: 64 MMHG

## 2020-12-04 VITALS — SYSTOLIC BLOOD PRESSURE: 140 MMHG | DIASTOLIC BLOOD PRESSURE: 58 MMHG

## 2020-12-04 VITALS — DIASTOLIC BLOOD PRESSURE: 73 MMHG | SYSTOLIC BLOOD PRESSURE: 157 MMHG

## 2020-12-04 VITALS — SYSTOLIC BLOOD PRESSURE: 130 MMHG | DIASTOLIC BLOOD PRESSURE: 43 MMHG

## 2020-12-04 VITALS — DIASTOLIC BLOOD PRESSURE: 43 MMHG | SYSTOLIC BLOOD PRESSURE: 139 MMHG

## 2020-12-04 VITALS — DIASTOLIC BLOOD PRESSURE: 38 MMHG | SYSTOLIC BLOOD PRESSURE: 112 MMHG

## 2020-12-04 VITALS — DIASTOLIC BLOOD PRESSURE: 41 MMHG | SYSTOLIC BLOOD PRESSURE: 123 MMHG

## 2020-12-04 VITALS — SYSTOLIC BLOOD PRESSURE: 111 MMHG | DIASTOLIC BLOOD PRESSURE: 39 MMHG

## 2020-12-04 VITALS — SYSTOLIC BLOOD PRESSURE: 136 MMHG | DIASTOLIC BLOOD PRESSURE: 46 MMHG

## 2020-12-04 VITALS — DIASTOLIC BLOOD PRESSURE: 46 MMHG | SYSTOLIC BLOOD PRESSURE: 146 MMHG

## 2020-12-04 VITALS — SYSTOLIC BLOOD PRESSURE: 136 MMHG | DIASTOLIC BLOOD PRESSURE: 44 MMHG

## 2020-12-04 VITALS — DIASTOLIC BLOOD PRESSURE: 40 MMHG | SYSTOLIC BLOOD PRESSURE: 131 MMHG

## 2020-12-04 VITALS — SYSTOLIC BLOOD PRESSURE: 137 MMHG | DIASTOLIC BLOOD PRESSURE: 41 MMHG

## 2020-12-04 VITALS — DIASTOLIC BLOOD PRESSURE: 38 MMHG | SYSTOLIC BLOOD PRESSURE: 116 MMHG

## 2020-12-04 VITALS — SYSTOLIC BLOOD PRESSURE: 147 MMHG | DIASTOLIC BLOOD PRESSURE: 54 MMHG

## 2020-12-04 VITALS — DIASTOLIC BLOOD PRESSURE: 47 MMHG | SYSTOLIC BLOOD PRESSURE: 132 MMHG

## 2020-12-04 VITALS — SYSTOLIC BLOOD PRESSURE: 137 MMHG | DIASTOLIC BLOOD PRESSURE: 44 MMHG

## 2020-12-04 VITALS — DIASTOLIC BLOOD PRESSURE: 44 MMHG | SYSTOLIC BLOOD PRESSURE: 100 MMHG

## 2020-12-04 VITALS — DIASTOLIC BLOOD PRESSURE: 41 MMHG | SYSTOLIC BLOOD PRESSURE: 102 MMHG

## 2020-12-04 VITALS — SYSTOLIC BLOOD PRESSURE: 154 MMHG | DIASTOLIC BLOOD PRESSURE: 47 MMHG

## 2020-12-04 VITALS — SYSTOLIC BLOOD PRESSURE: 111 MMHG | DIASTOLIC BLOOD PRESSURE: 41 MMHG

## 2020-12-04 VITALS — DIASTOLIC BLOOD PRESSURE: 41 MMHG | SYSTOLIC BLOOD PRESSURE: 111 MMHG

## 2020-12-04 VITALS — SYSTOLIC BLOOD PRESSURE: 120 MMHG | DIASTOLIC BLOOD PRESSURE: 46 MMHG

## 2020-12-04 VITALS — SYSTOLIC BLOOD PRESSURE: 128 MMHG | DIASTOLIC BLOOD PRESSURE: 43 MMHG

## 2020-12-04 VITALS — SYSTOLIC BLOOD PRESSURE: 142 MMHG | DIASTOLIC BLOOD PRESSURE: 46 MMHG

## 2020-12-04 VITALS — DIASTOLIC BLOOD PRESSURE: 43 MMHG | SYSTOLIC BLOOD PRESSURE: 146 MMHG

## 2020-12-04 VITALS — DIASTOLIC BLOOD PRESSURE: 40 MMHG | SYSTOLIC BLOOD PRESSURE: 113 MMHG

## 2020-12-04 VITALS — SYSTOLIC BLOOD PRESSURE: 118 MMHG | DIASTOLIC BLOOD PRESSURE: 41 MMHG

## 2020-12-04 VITALS — SYSTOLIC BLOOD PRESSURE: 135 MMHG | DIASTOLIC BLOOD PRESSURE: 41 MMHG

## 2020-12-04 VITALS — SYSTOLIC BLOOD PRESSURE: 156 MMHG | DIASTOLIC BLOOD PRESSURE: 50 MMHG

## 2020-12-04 VITALS — SYSTOLIC BLOOD PRESSURE: 129 MMHG | DIASTOLIC BLOOD PRESSURE: 48 MMHG

## 2020-12-04 VITALS — SYSTOLIC BLOOD PRESSURE: 110 MMHG | DIASTOLIC BLOOD PRESSURE: 42 MMHG

## 2020-12-04 VITALS — DIASTOLIC BLOOD PRESSURE: 56 MMHG | SYSTOLIC BLOOD PRESSURE: 157 MMHG

## 2020-12-04 VITALS — SYSTOLIC BLOOD PRESSURE: 145 MMHG | DIASTOLIC BLOOD PRESSURE: 46 MMHG

## 2020-12-04 VITALS — SYSTOLIC BLOOD PRESSURE: 146 MMHG | DIASTOLIC BLOOD PRESSURE: 45 MMHG

## 2020-12-04 VITALS — DIASTOLIC BLOOD PRESSURE: 42 MMHG | SYSTOLIC BLOOD PRESSURE: 110 MMHG

## 2020-12-04 VITALS — SYSTOLIC BLOOD PRESSURE: 123 MMHG | DIASTOLIC BLOOD PRESSURE: 40 MMHG

## 2020-12-04 VITALS — DIASTOLIC BLOOD PRESSURE: 40 MMHG | SYSTOLIC BLOOD PRESSURE: 135 MMHG

## 2020-12-04 VITALS — DIASTOLIC BLOOD PRESSURE: 40 MMHG | SYSTOLIC BLOOD PRESSURE: 126 MMHG

## 2020-12-04 VITALS — SYSTOLIC BLOOD PRESSURE: 129 MMHG | DIASTOLIC BLOOD PRESSURE: 43 MMHG

## 2020-12-04 VITALS — DIASTOLIC BLOOD PRESSURE: 39 MMHG | SYSTOLIC BLOOD PRESSURE: 112 MMHG

## 2020-12-04 VITALS — DIASTOLIC BLOOD PRESSURE: 47 MMHG | SYSTOLIC BLOOD PRESSURE: 150 MMHG

## 2020-12-04 VITALS — DIASTOLIC BLOOD PRESSURE: 41 MMHG | SYSTOLIC BLOOD PRESSURE: 118 MMHG

## 2020-12-04 VITALS — DIASTOLIC BLOOD PRESSURE: 44 MMHG | SYSTOLIC BLOOD PRESSURE: 118 MMHG

## 2020-12-04 VITALS — DIASTOLIC BLOOD PRESSURE: 44 MMHG | SYSTOLIC BLOOD PRESSURE: 136 MMHG

## 2020-12-04 VITALS — DIASTOLIC BLOOD PRESSURE: 39 MMHG | SYSTOLIC BLOOD PRESSURE: 114 MMHG

## 2020-12-04 VITALS — SYSTOLIC BLOOD PRESSURE: 120 MMHG | DIASTOLIC BLOOD PRESSURE: 45 MMHG

## 2020-12-04 VITALS — DIASTOLIC BLOOD PRESSURE: 48 MMHG | SYSTOLIC BLOOD PRESSURE: 154 MMHG

## 2020-12-04 VITALS — SYSTOLIC BLOOD PRESSURE: 111 MMHG | DIASTOLIC BLOOD PRESSURE: 40 MMHG

## 2020-12-04 VITALS — DIASTOLIC BLOOD PRESSURE: 41 MMHG | SYSTOLIC BLOOD PRESSURE: 137 MMHG

## 2020-12-04 VITALS — SYSTOLIC BLOOD PRESSURE: 151 MMHG | DIASTOLIC BLOOD PRESSURE: 47 MMHG

## 2020-12-04 VITALS — DIASTOLIC BLOOD PRESSURE: 44 MMHG | SYSTOLIC BLOOD PRESSURE: 139 MMHG

## 2020-12-04 VITALS — SYSTOLIC BLOOD PRESSURE: 177 MMHG | DIASTOLIC BLOOD PRESSURE: 65 MMHG

## 2020-12-04 VITALS — DIASTOLIC BLOOD PRESSURE: 37 MMHG | SYSTOLIC BLOOD PRESSURE: 122 MMHG

## 2020-12-04 VITALS — SYSTOLIC BLOOD PRESSURE: 112 MMHG | DIASTOLIC BLOOD PRESSURE: 41 MMHG

## 2020-12-04 VITALS — SYSTOLIC BLOOD PRESSURE: 121 MMHG | DIASTOLIC BLOOD PRESSURE: 44 MMHG

## 2020-12-04 VITALS — SYSTOLIC BLOOD PRESSURE: 143 MMHG | DIASTOLIC BLOOD PRESSURE: 48 MMHG

## 2020-12-04 VITALS — DIASTOLIC BLOOD PRESSURE: 44 MMHG | SYSTOLIC BLOOD PRESSURE: 128 MMHG

## 2020-12-04 VITALS — SYSTOLIC BLOOD PRESSURE: 147 MMHG | DIASTOLIC BLOOD PRESSURE: 46 MMHG

## 2020-12-04 VITALS — DIASTOLIC BLOOD PRESSURE: 39 MMHG | SYSTOLIC BLOOD PRESSURE: 134 MMHG

## 2020-12-04 VITALS — DIASTOLIC BLOOD PRESSURE: 37 MMHG | SYSTOLIC BLOOD PRESSURE: 112 MMHG

## 2020-12-04 LAB
ANION GAP SERPL CALC-SCNC: 5 MMOL/L (ref 7–16)
BUN SERPL-MCNC: 56 MG/DL (ref 7–18)
CALCIUM SERPL-MCNC: 9.3 MG/DL (ref 8.5–10.1)
CHLORIDE SERPL-SCNC: 109 MMOL/L (ref 98–107)
CO2 SERPL-SCNC: 31 MMOL/L (ref 21–32)
CREAT SERPL-MCNC: 0.4 MG/DL (ref 0.6–1)
ERYTHROCYTE [DISTWIDTH] IN BLOOD BY AUTOMATED COUNT: 20.9 % (ref 10.5–14.5)
GLUCOSE SERPL-MCNC: 220 MG/DL (ref 74–106)
HCT VFR BLD CALC: 22.3 % (ref 37–47)
HGB BLD-MCNC: 7 GM/DL (ref 12–15)
MCH RBC QN AUTO: 27 PG (ref 26–34)
MCHC RBC AUTO-ENTMCNC: 31.1 G/DL (ref 28–37)
MCV RBC: 86.8 FL (ref 80–100)
PLATELET # BLD: 58 THOU/UL (ref 150–400)
POTASSIUM SERPL-SCNC: 4.3 MMOL/L (ref 3.5–5.1)
RBC # BLD AUTO: 2.57 MIL/UL (ref 4.2–5)
SODIUM SERPL-SCNC: 145 MMOL/L (ref 136–145)
WBC # BLD AUTO: 12.2 THOU/UL (ref 4–11)

## 2020-12-04 NOTE — NUR
DISCUSSED STATUS WITH ROBBIE WHO STATED "I'LL GIVE JEFFREY THE UPDATE." PT DOES
NOT HAVE COUGH OR PAIN REFLEX, SLIGHT GAG WITH ET SUCTION. DOES NOT TOLERATE
TURNS VERY WELL. CT WITH AIR LEAK

## 2020-12-04 NOTE — NUR
chart review. unable to visit with jono bronson coivd +, isolation and
conserving on ppe. nutritional support, cont to require vent support. cm tried
calling son farzad montero, no answer. left message requesting call back. will cont
following as needed for dc needs.

## 2020-12-05 VITALS — SYSTOLIC BLOOD PRESSURE: 121 MMHG | DIASTOLIC BLOOD PRESSURE: 50 MMHG

## 2020-12-05 VITALS — DIASTOLIC BLOOD PRESSURE: 40 MMHG | SYSTOLIC BLOOD PRESSURE: 102 MMHG

## 2020-12-05 VITALS — DIASTOLIC BLOOD PRESSURE: 46 MMHG | SYSTOLIC BLOOD PRESSURE: 130 MMHG

## 2020-12-05 VITALS — SYSTOLIC BLOOD PRESSURE: 130 MMHG | DIASTOLIC BLOOD PRESSURE: 46 MMHG

## 2020-12-05 VITALS — SYSTOLIC BLOOD PRESSURE: 90 MMHG | DIASTOLIC BLOOD PRESSURE: 45 MMHG

## 2020-12-05 VITALS — DIASTOLIC BLOOD PRESSURE: 38 MMHG | SYSTOLIC BLOOD PRESSURE: 130 MMHG

## 2020-12-05 VITALS — DIASTOLIC BLOOD PRESSURE: 58 MMHG | SYSTOLIC BLOOD PRESSURE: 127 MMHG

## 2020-12-05 VITALS — SYSTOLIC BLOOD PRESSURE: 118 MMHG | DIASTOLIC BLOOD PRESSURE: 40 MMHG

## 2020-12-05 VITALS — SYSTOLIC BLOOD PRESSURE: 126 MMHG | DIASTOLIC BLOOD PRESSURE: 45 MMHG

## 2020-12-05 VITALS — DIASTOLIC BLOOD PRESSURE: 44 MMHG | SYSTOLIC BLOOD PRESSURE: 126 MMHG

## 2020-12-05 VITALS — DIASTOLIC BLOOD PRESSURE: 36 MMHG | SYSTOLIC BLOOD PRESSURE: 101 MMHG

## 2020-12-05 VITALS — SYSTOLIC BLOOD PRESSURE: 143 MMHG | DIASTOLIC BLOOD PRESSURE: 45 MMHG

## 2020-12-05 VITALS — SYSTOLIC BLOOD PRESSURE: 115 MMHG | DIASTOLIC BLOOD PRESSURE: 52 MMHG

## 2020-12-05 VITALS — SYSTOLIC BLOOD PRESSURE: 126 MMHG | DIASTOLIC BLOOD PRESSURE: 36 MMHG

## 2020-12-05 VITALS — SYSTOLIC BLOOD PRESSURE: 113 MMHG | DIASTOLIC BLOOD PRESSURE: 42 MMHG

## 2020-12-05 VITALS — SYSTOLIC BLOOD PRESSURE: 108 MMHG | DIASTOLIC BLOOD PRESSURE: 40 MMHG

## 2020-12-05 VITALS — SYSTOLIC BLOOD PRESSURE: 103 MMHG | DIASTOLIC BLOOD PRESSURE: 40 MMHG

## 2020-12-05 VITALS — SYSTOLIC BLOOD PRESSURE: 103 MMHG | DIASTOLIC BLOOD PRESSURE: 46 MMHG

## 2020-12-05 VITALS — SYSTOLIC BLOOD PRESSURE: 124 MMHG | DIASTOLIC BLOOD PRESSURE: 37 MMHG

## 2020-12-05 VITALS — SYSTOLIC BLOOD PRESSURE: 143 MMHG | DIASTOLIC BLOOD PRESSURE: 56 MMHG

## 2020-12-05 VITALS — DIASTOLIC BLOOD PRESSURE: 40 MMHG | SYSTOLIC BLOOD PRESSURE: 101 MMHG

## 2020-12-05 VITALS — DIASTOLIC BLOOD PRESSURE: 45 MMHG | SYSTOLIC BLOOD PRESSURE: 124 MMHG

## 2020-12-05 VITALS — DIASTOLIC BLOOD PRESSURE: 35 MMHG | SYSTOLIC BLOOD PRESSURE: 102 MMHG

## 2020-12-05 VITALS — DIASTOLIC BLOOD PRESSURE: 40 MMHG | SYSTOLIC BLOOD PRESSURE: 104 MMHG

## 2020-12-05 VITALS — SYSTOLIC BLOOD PRESSURE: 110 MMHG | DIASTOLIC BLOOD PRESSURE: 36 MMHG

## 2020-12-05 VITALS — SYSTOLIC BLOOD PRESSURE: 122 MMHG | DIASTOLIC BLOOD PRESSURE: 41 MMHG

## 2020-12-05 VITALS — DIASTOLIC BLOOD PRESSURE: 56 MMHG | SYSTOLIC BLOOD PRESSURE: 123 MMHG

## 2020-12-05 VITALS — SYSTOLIC BLOOD PRESSURE: 140 MMHG | DIASTOLIC BLOOD PRESSURE: 49 MMHG

## 2020-12-05 VITALS — SYSTOLIC BLOOD PRESSURE: 136 MMHG | DIASTOLIC BLOOD PRESSURE: 49 MMHG

## 2020-12-05 VITALS — DIASTOLIC BLOOD PRESSURE: 51 MMHG | SYSTOLIC BLOOD PRESSURE: 111 MMHG

## 2020-12-05 LAB
ANION GAP SERPL CALC-SCNC: 4 MMOL/L (ref 7–16)
BE(VIVO): 3.7 MMOL/L
BUN SERPL-MCNC: 55 MG/DL (ref 7–18)
CALCIUM SERPL-MCNC: 9.8 MG/DL (ref 8.5–10.1)
CHLORIDE SERPL-SCNC: 110 MMOL/L (ref 98–107)
CO2 SERPL-SCNC: 32 MMOL/L (ref 21–32)
CREAT SERPL-MCNC: 0.4 MG/DL (ref 0.6–1)
ERYTHROCYTE [DISTWIDTH] IN BLOOD BY AUTOMATED COUNT: 20.6 % (ref 10.5–14.5)
GLUCOSE SERPL-MCNC: 172 MG/DL (ref 74–106)
HCO3 BLD-SCNC: 30.9 MMOL/L (ref 22–26)
HCT VFR BLD CALC: 23.7 % (ref 37–47)
HGB BLD-MCNC: 7.3 GM/DL (ref 12–15)
MCH RBC QN AUTO: 26.8 PG (ref 26–34)
MCHC RBC AUTO-ENTMCNC: 30.6 G/DL (ref 28–37)
MCV RBC: 87.7 FL (ref 80–100)
PCO2 BLD: 61.2 MMHG (ref 35–45)
PLATELET # BLD: 64 THOU/UL (ref 150–400)
PO2 BLD: 64.4 MMHG (ref 80–100)
POTASSIUM SERPL-SCNC: 4.5 MMOL/L (ref 3.5–5.1)
RBC # BLD AUTO: 2.71 MIL/UL (ref 4.2–5)
SODIUM SERPL-SCNC: 146 MMOL/L (ref 136–145)
WBC # BLD AUTO: 11.5 THOU/UL (ref 4–11)

## 2020-12-05 NOTE — NUR
RN ASSUMED PT'S CARE AT 0700AM, PT IS INTUBATED WITH VENTILATOR FO2 100%, PT
IS ON SEDATION , PT OPENS HER EYES BY ACTIVIES ( CHANGE POSTION ) , PT DOES
DOT FOLLOW COMMANDS, PT 'S VS AND O2SAT ARE STABLE, PT IS TOLERATED TUBE
FEEDING AT 45ML/HR, PT'S R CHEST TUBE IS WORKING, PT IS DNR NOW.

## 2020-12-06 VITALS — DIASTOLIC BLOOD PRESSURE: 27 MMHG | SYSTOLIC BLOOD PRESSURE: 165 MMHG

## 2020-12-06 VITALS — DIASTOLIC BLOOD PRESSURE: 37 MMHG | SYSTOLIC BLOOD PRESSURE: 146 MMHG

## 2020-12-06 VITALS — SYSTOLIC BLOOD PRESSURE: 110 MMHG | DIASTOLIC BLOOD PRESSURE: 37 MMHG

## 2020-12-06 VITALS — SYSTOLIC BLOOD PRESSURE: 157 MMHG | DIASTOLIC BLOOD PRESSURE: 42 MMHG

## 2020-12-06 VITALS — SYSTOLIC BLOOD PRESSURE: 132 MMHG | DIASTOLIC BLOOD PRESSURE: 37 MMHG

## 2020-12-06 VITALS — DIASTOLIC BLOOD PRESSURE: 33 MMHG | SYSTOLIC BLOOD PRESSURE: 120 MMHG

## 2020-12-06 VITALS — SYSTOLIC BLOOD PRESSURE: 123 MMHG | DIASTOLIC BLOOD PRESSURE: 21 MMHG

## 2020-12-06 VITALS — SYSTOLIC BLOOD PRESSURE: 127 MMHG | DIASTOLIC BLOOD PRESSURE: 31 MMHG

## 2020-12-06 VITALS — DIASTOLIC BLOOD PRESSURE: 71 MMHG | SYSTOLIC BLOOD PRESSURE: 169 MMHG

## 2020-12-06 VITALS — DIASTOLIC BLOOD PRESSURE: 40 MMHG | SYSTOLIC BLOOD PRESSURE: 159 MMHG

## 2020-12-06 VITALS — SYSTOLIC BLOOD PRESSURE: 143 MMHG | DIASTOLIC BLOOD PRESSURE: 47 MMHG

## 2020-12-06 VITALS — SYSTOLIC BLOOD PRESSURE: 128 MMHG | DIASTOLIC BLOOD PRESSURE: 33 MMHG

## 2020-12-06 VITALS — SYSTOLIC BLOOD PRESSURE: 195 MMHG | DIASTOLIC BLOOD PRESSURE: 72 MMHG

## 2020-12-06 VITALS — DIASTOLIC BLOOD PRESSURE: 39 MMHG | SYSTOLIC BLOOD PRESSURE: 127 MMHG

## 2020-12-06 VITALS — SYSTOLIC BLOOD PRESSURE: 142 MMHG | DIASTOLIC BLOOD PRESSURE: 25 MMHG

## 2020-12-06 VITALS — DIASTOLIC BLOOD PRESSURE: 27 MMHG | SYSTOLIC BLOOD PRESSURE: 133 MMHG

## 2020-12-06 VITALS — DIASTOLIC BLOOD PRESSURE: 33 MMHG | SYSTOLIC BLOOD PRESSURE: 116 MMHG

## 2020-12-06 VITALS — DIASTOLIC BLOOD PRESSURE: 29 MMHG | SYSTOLIC BLOOD PRESSURE: 149 MMHG

## 2020-12-06 VITALS — DIASTOLIC BLOOD PRESSURE: 35 MMHG | SYSTOLIC BLOOD PRESSURE: 107 MMHG

## 2020-12-06 VITALS — DIASTOLIC BLOOD PRESSURE: 32 MMHG | SYSTOLIC BLOOD PRESSURE: 148 MMHG

## 2020-12-06 VITALS — DIASTOLIC BLOOD PRESSURE: 55 MMHG | SYSTOLIC BLOOD PRESSURE: 176 MMHG

## 2020-12-06 VITALS — SYSTOLIC BLOOD PRESSURE: 121 MMHG | DIASTOLIC BLOOD PRESSURE: 36 MMHG

## 2020-12-06 VITALS — DIASTOLIC BLOOD PRESSURE: 37 MMHG | SYSTOLIC BLOOD PRESSURE: 118 MMHG

## 2020-12-06 VITALS — DIASTOLIC BLOOD PRESSURE: 20 MMHG | SYSTOLIC BLOOD PRESSURE: 96 MMHG

## 2020-12-06 VITALS — DIASTOLIC BLOOD PRESSURE: 28 MMHG | SYSTOLIC BLOOD PRESSURE: 131 MMHG

## 2020-12-06 VITALS — SYSTOLIC BLOOD PRESSURE: 143 MMHG | DIASTOLIC BLOOD PRESSURE: 29 MMHG

## 2020-12-06 VITALS — SYSTOLIC BLOOD PRESSURE: 101 MMHG | DIASTOLIC BLOOD PRESSURE: 43 MMHG

## 2020-12-06 VITALS — SYSTOLIC BLOOD PRESSURE: 100 MMHG | DIASTOLIC BLOOD PRESSURE: 40 MMHG

## 2020-12-06 VITALS — SYSTOLIC BLOOD PRESSURE: 125 MMHG | DIASTOLIC BLOOD PRESSURE: 43 MMHG

## 2020-12-06 VITALS — DIASTOLIC BLOOD PRESSURE: 29 MMHG | SYSTOLIC BLOOD PRESSURE: 137 MMHG

## 2020-12-06 VITALS — SYSTOLIC BLOOD PRESSURE: 113 MMHG | DIASTOLIC BLOOD PRESSURE: 29 MMHG

## 2020-12-06 VITALS — SYSTOLIC BLOOD PRESSURE: 117 MMHG | DIASTOLIC BLOOD PRESSURE: 37 MMHG

## 2020-12-06 VITALS — SYSTOLIC BLOOD PRESSURE: 174 MMHG | DIASTOLIC BLOOD PRESSURE: 55 MMHG

## 2020-12-06 VITALS — DIASTOLIC BLOOD PRESSURE: 42 MMHG | SYSTOLIC BLOOD PRESSURE: 142 MMHG

## 2020-12-06 VITALS — DIASTOLIC BLOOD PRESSURE: 45 MMHG | SYSTOLIC BLOOD PRESSURE: 124 MMHG

## 2020-12-06 VITALS — DIASTOLIC BLOOD PRESSURE: 36 MMHG | SYSTOLIC BLOOD PRESSURE: 122 MMHG

## 2020-12-06 VITALS — SYSTOLIC BLOOD PRESSURE: 98 MMHG | DIASTOLIC BLOOD PRESSURE: 25 MMHG

## 2020-12-06 VITALS — DIASTOLIC BLOOD PRESSURE: 52 MMHG | SYSTOLIC BLOOD PRESSURE: 151 MMHG

## 2020-12-06 VITALS — SYSTOLIC BLOOD PRESSURE: 71 MMHG | DIASTOLIC BLOOD PRESSURE: 53 MMHG

## 2020-12-06 VITALS — SYSTOLIC BLOOD PRESSURE: 122 MMHG | DIASTOLIC BLOOD PRESSURE: 47 MMHG

## 2020-12-06 VITALS — DIASTOLIC BLOOD PRESSURE: 38 MMHG | SYSTOLIC BLOOD PRESSURE: 132 MMHG

## 2020-12-06 VITALS — SYSTOLIC BLOOD PRESSURE: 142 MMHG | DIASTOLIC BLOOD PRESSURE: 40 MMHG

## 2020-12-06 VITALS — DIASTOLIC BLOOD PRESSURE: 32 MMHG | SYSTOLIC BLOOD PRESSURE: 102 MMHG

## 2020-12-06 VITALS — DIASTOLIC BLOOD PRESSURE: 44 MMHG | SYSTOLIC BLOOD PRESSURE: 119 MMHG

## 2020-12-06 VITALS — DIASTOLIC BLOOD PRESSURE: 34 MMHG | SYSTOLIC BLOOD PRESSURE: 132 MMHG

## 2020-12-06 VITALS — SYSTOLIC BLOOD PRESSURE: 129 MMHG | DIASTOLIC BLOOD PRESSURE: 30 MMHG

## 2020-12-06 VITALS — DIASTOLIC BLOOD PRESSURE: 38 MMHG | SYSTOLIC BLOOD PRESSURE: 168 MMHG

## 2020-12-06 VITALS — SYSTOLIC BLOOD PRESSURE: 101 MMHG | DIASTOLIC BLOOD PRESSURE: 28 MMHG

## 2020-12-06 VITALS — DIASTOLIC BLOOD PRESSURE: 54 MMHG | SYSTOLIC BLOOD PRESSURE: 186 MMHG

## 2020-12-06 VITALS — DIASTOLIC BLOOD PRESSURE: 25 MMHG | SYSTOLIC BLOOD PRESSURE: 168 MMHG

## 2020-12-06 VITALS — DIASTOLIC BLOOD PRESSURE: 22 MMHG | SYSTOLIC BLOOD PRESSURE: 147 MMHG

## 2020-12-06 VITALS — DIASTOLIC BLOOD PRESSURE: 25 MMHG | SYSTOLIC BLOOD PRESSURE: 100 MMHG

## 2020-12-06 VITALS — SYSTOLIC BLOOD PRESSURE: 125 MMHG | DIASTOLIC BLOOD PRESSURE: 31 MMHG

## 2020-12-06 VITALS — SYSTOLIC BLOOD PRESSURE: 124 MMHG | DIASTOLIC BLOOD PRESSURE: 29 MMHG

## 2020-12-06 VITALS — DIASTOLIC BLOOD PRESSURE: 24 MMHG | SYSTOLIC BLOOD PRESSURE: 117 MMHG

## 2020-12-06 VITALS — DIASTOLIC BLOOD PRESSURE: 27 MMHG | SYSTOLIC BLOOD PRESSURE: 129 MMHG

## 2020-12-06 VITALS — SYSTOLIC BLOOD PRESSURE: 124 MMHG | DIASTOLIC BLOOD PRESSURE: 40 MMHG

## 2020-12-06 VITALS — SYSTOLIC BLOOD PRESSURE: 93 MMHG | DIASTOLIC BLOOD PRESSURE: 32 MMHG

## 2020-12-06 VITALS — SYSTOLIC BLOOD PRESSURE: 128 MMHG | DIASTOLIC BLOOD PRESSURE: 39 MMHG

## 2020-12-06 VITALS — DIASTOLIC BLOOD PRESSURE: 60 MMHG | SYSTOLIC BLOOD PRESSURE: 190 MMHG

## 2020-12-06 VITALS — DIASTOLIC BLOOD PRESSURE: 39 MMHG | SYSTOLIC BLOOD PRESSURE: 155 MMHG

## 2020-12-06 VITALS — DIASTOLIC BLOOD PRESSURE: 46 MMHG | SYSTOLIC BLOOD PRESSURE: 147 MMHG

## 2020-12-06 VITALS — DIASTOLIC BLOOD PRESSURE: 26 MMHG | SYSTOLIC BLOOD PRESSURE: 111 MMHG

## 2020-12-06 VITALS — DIASTOLIC BLOOD PRESSURE: 27 MMHG | SYSTOLIC BLOOD PRESSURE: 152 MMHG

## 2020-12-06 VITALS — SYSTOLIC BLOOD PRESSURE: 147 MMHG | DIASTOLIC BLOOD PRESSURE: 40 MMHG

## 2020-12-06 VITALS — DIASTOLIC BLOOD PRESSURE: 45 MMHG | SYSTOLIC BLOOD PRESSURE: 138 MMHG

## 2020-12-06 VITALS — DIASTOLIC BLOOD PRESSURE: 32 MMHG | SYSTOLIC BLOOD PRESSURE: 101 MMHG

## 2020-12-06 LAB
ALBUMIN SERPL-MCNC: 1.3 G/DL (ref 3.4–5)
ALT SERPL-CCNC: 37 U/L (ref 30–65)
ANION GAP SERPL CALC-SCNC: 5 MMOL/L (ref 7–16)
ANISOCYTOSIS BLD QL SMEAR: (no result)
AST SERPL-CCNC: 21 U/L (ref 15–37)
BASOPHILS NFR BLD AUTO: 0 % (ref 0–2)
BE(VIVO): 3 MMOL/L
BILIRUB SERPL-MCNC: 0.3 MG/DL (ref 0.2–1)
BUN SERPL-MCNC: 46 MG/DL (ref 7–18)
CALCIUM SERPL-MCNC: 9.6 MG/DL (ref 8.5–10.1)
CHLORIDE SERPL-SCNC: 112 MMOL/L (ref 98–107)
CO2 SERPL-SCNC: 31 MMOL/L (ref 21–32)
CREAT SERPL-MCNC: 0.4 MG/DL (ref 0.6–1)
EOSINOPHIL NFR BLD: 0 % (ref 0–3)
ERYTHROCYTE [DISTWIDTH] IN BLOOD BY AUTOMATED COUNT: 20.8 % (ref 10.5–14.5)
GLUCOSE SERPL-MCNC: 169 MG/DL (ref 74–106)
GRANULOCYTES NFR BLD MANUAL: 88 % (ref 36–66)
HCO3 BLD-SCNC: 29.9 MMOL/L (ref 22–26)
HCT VFR BLD CALC: 28.6 % (ref 37–47)
HGB BLD-MCNC: 8.6 GM/DL (ref 12–15)
LYMPHOCYTES NFR BLD AUTO: 3 % (ref 24–44)
MCH RBC QN AUTO: 26.6 PG (ref 26–34)
MCHC RBC AUTO-ENTMCNC: 30.2 G/DL (ref 28–37)
MCV RBC: 88.1 FL (ref 80–100)
METAMYELOCYTES NFR BLD: 3 %
MONOCYTES NFR BLD: 3 % (ref 1–8)
MYELOCYTES NFR BLD: 2 %
NEUTROPHILS # BLD: 13.3 THOU/UL (ref 1.4–8.2)
NEUTS BAND NFR BLD: 1 % (ref 0–8)
PCO2 BLD: 59 MMHG (ref 35–45)
PLATELET # BLD: 83 THOU/UL (ref 150–400)
PO2 BLD: 69.2 MMHG (ref 80–100)
POTASSIUM SERPL-SCNC: 4.8 MMOL/L (ref 3.5–5.1)
PROT SERPL-MCNC: 5 G/DL (ref 6.4–8.2)
RBC # BLD AUTO: 3.24 MIL/UL (ref 4.2–5)
SODIUM SERPL-SCNC: 148 MMOL/L (ref 136–145)
WBC # BLD AUTO: 14.9 THOU/UL (ref 4–11)

## 2020-12-06 NOTE — NUR
Report received at 1900, care assumed. Assessments done as documented. No
sedation vacation done, pt's FIO2 100% and on pressure control. Dr Mak
rounded on pt at 0500, updated on pt condition. No new orders received.

## 2020-12-06 NOTE — NUR
NOTIFIED FLORA HANDY AT [facility], PLAN FOR
EXTUBATION. POSSIBLE CANDIDATE FOR TISSUE DONATION. REQUESTED RETURN CALL AT
CARDIAC TIME OF DEATH.

## 2020-12-06 NOTE — NUR
family not present today, nor did they inquire regarding pt status. continued
sedation for tolerating vent, fio2 100% with sa02 intermittently decreasing to
85% when sedation off briefly. no progress.

## 2020-12-07 VITALS — SYSTOLIC BLOOD PRESSURE: 117 MMHG | DIASTOLIC BLOOD PRESSURE: 42 MMHG

## 2020-12-07 VITALS — SYSTOLIC BLOOD PRESSURE: 129 MMHG | DIASTOLIC BLOOD PRESSURE: 39 MMHG

## 2020-12-07 VITALS — DIASTOLIC BLOOD PRESSURE: 37 MMHG | SYSTOLIC BLOOD PRESSURE: 107 MMHG

## 2020-12-07 VITALS — SYSTOLIC BLOOD PRESSURE: 99 MMHG | DIASTOLIC BLOOD PRESSURE: 34 MMHG

## 2020-12-07 VITALS — DIASTOLIC BLOOD PRESSURE: 40 MMHG | SYSTOLIC BLOOD PRESSURE: 106 MMHG

## 2020-12-07 VITALS — SYSTOLIC BLOOD PRESSURE: 109 MMHG | DIASTOLIC BLOOD PRESSURE: 40 MMHG

## 2020-12-07 VITALS — SYSTOLIC BLOOD PRESSURE: 147 MMHG | DIASTOLIC BLOOD PRESSURE: 56 MMHG

## 2020-12-07 VITALS — SYSTOLIC BLOOD PRESSURE: 106 MMHG | DIASTOLIC BLOOD PRESSURE: 40 MMHG

## 2020-12-07 VITALS — SYSTOLIC BLOOD PRESSURE: 135 MMHG | DIASTOLIC BLOOD PRESSURE: 36 MMHG

## 2020-12-07 VITALS — DIASTOLIC BLOOD PRESSURE: 32 MMHG | SYSTOLIC BLOOD PRESSURE: 93 MMHG

## 2020-12-07 VITALS — SYSTOLIC BLOOD PRESSURE: 95 MMHG | DIASTOLIC BLOOD PRESSURE: 32 MMHG

## 2020-12-07 VITALS — DIASTOLIC BLOOD PRESSURE: 34 MMHG | SYSTOLIC BLOOD PRESSURE: 93 MMHG

## 2020-12-07 VITALS — SYSTOLIC BLOOD PRESSURE: 95 MMHG | DIASTOLIC BLOOD PRESSURE: 34 MMHG

## 2020-12-07 VITALS — SYSTOLIC BLOOD PRESSURE: 113 MMHG | DIASTOLIC BLOOD PRESSURE: 41 MMHG

## 2020-12-07 VITALS — DIASTOLIC BLOOD PRESSURE: 34 MMHG | SYSTOLIC BLOOD PRESSURE: 107 MMHG

## 2020-12-07 VITALS — DIASTOLIC BLOOD PRESSURE: 34 MMHG | SYSTOLIC BLOOD PRESSURE: 91 MMHG

## 2020-12-07 VITALS — DIASTOLIC BLOOD PRESSURE: 40 MMHG | SYSTOLIC BLOOD PRESSURE: 112 MMHG

## 2020-12-07 NOTE — NUR
@ 1340, PT WAS EXTUBATED TO ROOM AIR AFTER ALL IV DRIPS STOPPED. MORPHINE
GIVEN AS ORDERED. PT'S SONS, SARAN, GLORAI, AND SILVANA WERE PRESENT PRIOR TO
EXTUBATION AND IMMEDIATELY AFTERWARD AND STOOD AT THE [facility] DO[facility] WITH BROTHER
DAVE AND THIS RN. PT  @ 1357 AND TWO RN'S PRONOUNCED. REASSURANCES AND
EMOTIONAL SUPPORT WAS PROVIDED FOR THE FAMILY MEMBERS WHO WERE PRESENT.

## 2020-12-07 NOTE — NUR
PT'S SONS, SARAN, GLORIA, AND SILVANA VISITED THROUGH [facility] DOOR PRIOR TO
PALLIATIVE EXTUBATION. BROTHER DAVE ASSISTING WITH PROCESS. REASSURANCES AND
EMOTIONAL SUPPORT PROVIDED.

## 2020-12-07 NOTE — NUR
chart review. she still requiring vent. possible will palliative extubate
today after henry carrasco and mina discuss. will cont following as needed for dc
needs.

## 2020-12-07 NOTE — NUR
THIS  WAS PRESENT AND PROVIDED SUPPORT FOR THE THE FAMILY BEFORE AND
AFTER THE EXTUBATION AND DEATH OF THE PATIENT.
 
LOCO ROGERS  HOME AT [facility] AND [place] [facility] WILL BE UTILIZED.

## 2023-02-07 NOTE — NUR
1932 - SPOKE WITH SON SARAN TO OBTAIN CONSENT FOR TRANSFUSION OF 1 UNIT RBCS.
ALL QUESTIONS ANSWERED. This document is complete and the patient is ready for discharge.